# Patient Record
Sex: FEMALE | Race: WHITE | NOT HISPANIC OR LATINO | URBAN - METROPOLITAN AREA
[De-identification: names, ages, dates, MRNs, and addresses within clinical notes are randomized per-mention and may not be internally consistent; named-entity substitution may affect disease eponyms.]

---

## 2019-04-13 ENCOUNTER — INPATIENT (INPATIENT)
Facility: HOSPITAL | Age: 84
LOS: 11 days | Discharge: HOME HEALTH | DRG: 177 | End: 2019-04-25
Attending: STUDENT IN AN ORGANIZED HEALTH CARE EDUCATION/TRAINING PROGRAM | Admitting: STUDENT IN AN ORGANIZED HEALTH CARE EDUCATION/TRAINING PROGRAM
Payer: MEDICARE

## 2019-04-13 VITALS
HEART RATE: 74 BPM | TEMPERATURE: 98 F | HEIGHT: 67 IN | RESPIRATION RATE: 16 BRPM | SYSTOLIC BLOOD PRESSURE: 162 MMHG | OXYGEN SATURATION: 98 % | DIASTOLIC BLOOD PRESSURE: 80 MMHG | WEIGHT: 199.96 LBS

## 2019-04-13 DIAGNOSIS — R06.00 DYSPNEA, UNSPECIFIED: ICD-10-CM

## 2019-04-13 LAB
BASOPHILS # BLD AUTO: 0.1 K/UL — SIGNIFICANT CHANGE UP (ref 0–0.2)
BASOPHILS NFR BLD AUTO: 0.4 % — SIGNIFICANT CHANGE UP (ref 0–2)
EOSINOPHIL # BLD AUTO: 0 K/UL — SIGNIFICANT CHANGE UP (ref 0–0.5)
EOSINOPHIL NFR BLD AUTO: 0.1 % — SIGNIFICANT CHANGE UP (ref 0–6)
GAS PNL BLDV: SIGNIFICANT CHANGE UP
HCT VFR BLD CALC: 28.9 % — LOW (ref 34.5–45)
HGB BLD-MCNC: 9.2 G/DL — LOW (ref 11.5–15.5)
INR BLD: 1.78 RATIO — HIGH (ref 0.88–1.16)
LYMPHOCYTES # BLD AUTO: 0.4 K/UL — LOW (ref 1–3.3)
LYMPHOCYTES # BLD AUTO: 3 % — LOW (ref 13–44)
MCHC RBC-ENTMCNC: 24.8 PG — LOW (ref 27–34)
MCHC RBC-ENTMCNC: 31.9 GM/DL — LOW (ref 32–36)
MCV RBC AUTO: 77.6 FL — LOW (ref 80–100)
MONOCYTES # BLD AUTO: 0.1 K/UL — SIGNIFICANT CHANGE UP (ref 0–0.9)
MONOCYTES NFR BLD AUTO: 0.6 % — LOW (ref 2–14)
NEUTROPHILS # BLD AUTO: 13.4 K/UL — HIGH (ref 1.8–7.4)
NEUTROPHILS NFR BLD AUTO: 96 % — HIGH (ref 43–77)
NT-PROBNP SERPL-SCNC: 7846 PG/ML — HIGH (ref 0–300)
PLATELET # BLD AUTO: 320 K/UL — SIGNIFICANT CHANGE UP (ref 150–400)
PROTHROM AB SERPL-ACNC: 20.6 SEC — HIGH (ref 10–12.9)
RAPID RVP RESULT: SIGNIFICANT CHANGE UP
RBC # BLD: 3.73 M/UL — LOW (ref 3.8–5.2)
RBC # FLD: 17.8 % — HIGH (ref 10.3–14.5)
TROPONIN T, HIGH SENSITIVITY RESULT: 82 NG/L — HIGH (ref 0–51)
TSH SERPL-MCNC: 0.68 UIU/ML — SIGNIFICANT CHANGE UP (ref 0.27–4.2)
WBC # BLD: 14 K/UL — HIGH (ref 3.8–10.5)
WBC # FLD AUTO: 14 K/UL — HIGH (ref 3.8–10.5)

## 2019-04-13 PROCEDURE — 93010 ELECTROCARDIOGRAM REPORT: CPT

## 2019-04-13 PROCEDURE — 99284 EMERGENCY DEPT VISIT MOD MDM: CPT | Mod: 25

## 2019-04-13 PROCEDURE — 71045 X-RAY EXAM CHEST 1 VIEW: CPT | Mod: 26

## 2019-04-13 RX ORDER — FUROSEMIDE 40 MG
40 TABLET ORAL ONCE
Qty: 0 | Refills: 0 | Status: COMPLETED | OUTPATIENT
Start: 2019-04-13 | End: 2019-04-13

## 2019-04-13 RX ADMIN — Medication 40 MILLIGRAM(S): at 21:58

## 2019-04-13 NOTE — ED PROVIDER NOTE - PHYSICAL EXAMINATION
GENERAL: NAD, well-developed  HEAD:  Atraumatic, Normocephalic  EYES: EOMI, PERRLA, conjunctiva and sclera clear  NECK: Supple, No JVD  CHEST/LUNG: decreased bs at b/l bases, L>R, no wheezing or rales  HEART: Regular rate and rhythm; No murmurs, rubs, or gallops  ABDOMEN: Soft, Nontender, Nondistended; Bowel sounds present  EXTREMITIES:  2+ Peripheral Pulses, No clubbing, cyanosis, trace edema at b/l ankles  PSYCH: AAOx3  NEUROLOGY: non-focal  SKIN: No rashes or lesions

## 2019-04-13 NOTE — ED PROVIDER NOTE - OBJECTIVE STATEMENT
84F with ?CHF, dvt on coumadin, PVD s/p stent, breast cancer s/p tx in 2014, RA, CKD (creatinine ~3 per patient) recently dx with b/l plef coming in with increasing SOB. Patient was seen by Pulmonologist Dr Reynoso in office on Thursday and had CXR and US and was told she had water in her lungs b/l with L>R. He planned to remove the fluid 84F with ?CHF, dvt on coumadin, PVD s/p stent, breast cancer s/p tx in 2014, RA, CKD (creatinine ~3 per patient) recently dx with b/l plef coming in with increasing SOB. Patient was seen by Pulmonologist Dr Reynoso in office on Thursday and had CXR and US and was told she had water in her lungs b/l with L>R. He planned to remove the fluid on Tuesday however the patients SOB progressed to the point where she could barely ambulate without dyspnea and therefore came to the hospital. For the past week she has been having a cough productive of yellow sputum. No fevers or chills. Denies CP, palpitations, lightheadedness, dizziness.

## 2019-04-13 NOTE — ED ADULT NURSE NOTE - OBJECTIVE STATEMENT
PT is an ambulatory 84 yr old female a/OX 3 c/o sob at rest and worse on exertion.  PERRL wnl, partida with equal strength.  Lungs have crackles at the bases b/l.  Denies chest pain.  Sob is worse on exertion and patient has been recently treated with Lasix for fluid overload in lungs.  Pt has a wet, non productive cough.  Abdomen NT ND.  No fevers, chills or N/V.  Abdomen NT ND.  No urinary symptoms.  Peripheral pulses +1, no edema

## 2019-04-13 NOTE — ED ADULT NURSE NOTE - TEMPLATE
Respiratory Quality 431: Preventive Care And Screening: Unhealthy Alcohol Use - Screening: Patient screened for unhealthy alcohol use using a single question and scores less than 2 times per year Detail Level: Detailed

## 2019-04-13 NOTE — ED PROVIDER NOTE - PROGRESS NOTE DETAILS
[place] PGY2: CXR L sided pleural effusion on assessment pt with very mild increased WOB but speaking comfortably in full sentences on RA will give lasix 40 mg IV x 1 and admit

## 2019-04-13 NOTE — ED ADULT NURSE NOTE - NSIMPLEMENTINTERV_GEN_ALL_ED
Implemented All Fall with Harm Risk Interventions:  [place] to call system. Call bell, personal items and telephone within reach. Instruct patient to call for assistance. Room bathroom lighting operational. Non-slip footwear when patient is off stretcher. Physically safe environment: no spills, clutter or unnecessary equipment. Stretcher in lowest position, wheels locked, appropriate side rails in place. Provide visual cue, wrist band, yellow gown, etc. Monitor gait and stability. Monitor for mental status changes and reorient to person, place, and time. Review medications for side effects contributing to fall risk. Reinforce activity limits and safety measures with patient and family. Provide visual clues: red socks.

## 2019-04-13 NOTE — ED PROVIDER NOTE - CLINICAL SUMMARY MEDICAL DECISION MAKING FREE TEXT BOX
84F with ?CHF, dvt on coumadin, PVD s/p stent, breast cancer s/p tx in 2014, RA, CKD (creatinine ~3 per patient) recently dx with b/l plef coming in with increasing SOB. 84F with ?CHF, dvt on coumadin, PVD s/p stent, breast cancer s/p tx in 2014, RA, CKD (creatinine ~3 per patient) recently dx with b/l plef coming in with increasing SOB possibly due to progression of plef from CHF vs breast cancer vs infection given productive cough - cbc, cmp, pt/inr, rvp, trop, tsh, bnp, cxr, ekg 84F with ?CHF, dvt on coumadin, PVD s/p stent, breast cancer s/p tx in 2014, RA, CKD (creatinine ~3 per patient) recently dx with b/l plef coming in with increasing SOB possibly due to progression of plef from CHF vs breast cancer vs infection given productive cough - cbc, cmp, pt/inr, rvp, trop, tsh, bnp, cxr, ekg and admission to the hospital ZR

## 2019-04-13 NOTE — ED PROVIDER NOTE - PMH
Anarthritic Rheumatoid Disease    Breast cancer  bilateral  CKD (chronic kidney disease)    Mee HTN    GERD (Gastroesophageal Reflux    Hypothyroid    Prediabetes

## 2019-04-14 DIAGNOSIS — K21.9 GASTRO-ESOPHAGEAL REFLUX DISEASE WITHOUT ESOPHAGITIS: ICD-10-CM

## 2019-04-14 DIAGNOSIS — D50.9 IRON DEFICIENCY ANEMIA, UNSPECIFIED: ICD-10-CM

## 2019-04-14 DIAGNOSIS — Z29.9 ENCOUNTER FOR PROPHYLACTIC MEASURES, UNSPECIFIED: ICD-10-CM

## 2019-04-14 DIAGNOSIS — J90 PLEURAL EFFUSION, NOT ELSEWHERE CLASSIFIED: ICD-10-CM

## 2019-04-14 DIAGNOSIS — E03.9 HYPOTHYROIDISM, UNSPECIFIED: ICD-10-CM

## 2019-04-14 DIAGNOSIS — M06.9 RHEUMATOID ARTHRITIS, UNSPECIFIED: ICD-10-CM

## 2019-04-14 DIAGNOSIS — N18.5 CHRONIC KIDNEY DISEASE, STAGE 5: ICD-10-CM

## 2019-04-14 LAB
24R-OH-CALCIDIOL SERPL-MCNC: 37.6 NG/ML — SIGNIFICANT CHANGE UP (ref 30–80)
ANION GAP SERPL CALC-SCNC: 15 MMOL/L — SIGNIFICANT CHANGE UP (ref 5–17)
APPEARANCE UR: ABNORMAL
APTT BLD: 30.8 SEC — SIGNIFICANT CHANGE UP (ref 27.5–36.3)
BILIRUB UR-MCNC: NEGATIVE — SIGNIFICANT CHANGE UP
BUN SERPL-MCNC: 49 MG/DL — HIGH (ref 7–23)
CALCIUM SERPL-MCNC: 9.3 MG/DL — SIGNIFICANT CHANGE UP (ref 8.4–10.5)
CALCIUM SERPL-MCNC: 9.7 MG/DL — SIGNIFICANT CHANGE UP (ref 8.4–10.5)
CHLORIDE SERPL-SCNC: 109 MMOL/L — HIGH (ref 96–108)
CHOLEST SERPL-MCNC: 157 MG/DL — SIGNIFICANT CHANGE UP (ref 10–199)
CO2 SERPL-SCNC: 19 MMOL/L — LOW (ref 22–31)
COLOR SPEC: SIGNIFICANT CHANGE UP
CREAT ?TM UR-MCNC: 27 MG/DL — SIGNIFICANT CHANGE UP
CREAT SERPL-MCNC: 3.89 MG/DL — HIGH (ref 0.5–1.3)
DIFF PNL FLD: SIGNIFICANT CHANGE UP
FERRITIN SERPL-MCNC: 34 NG/ML — SIGNIFICANT CHANGE UP (ref 15–150)
GLUCOSE SERPL-MCNC: 170 MG/DL — HIGH (ref 70–99)
GLUCOSE UR QL: SIGNIFICANT CHANGE UP
HCT VFR BLD CALC: 28 % — LOW (ref 34.5–45)
HDLC SERPL-MCNC: 55 MG/DL — SIGNIFICANT CHANGE UP
HGB BLD-MCNC: 8.6 G/DL — LOW (ref 11.5–15.5)
IRON SATN MFR SERPL: 14 UG/DL — LOW (ref 30–160)
IRON SATN MFR SERPL: 5 % — LOW (ref 14–50)
KETONES UR-MCNC: NEGATIVE — SIGNIFICANT CHANGE UP
LEUKOCYTE ESTERASE UR-ACNC: NEGATIVE — SIGNIFICANT CHANGE UP
LIPID PNL WITH DIRECT LDL SERPL: 89 MG/DL — SIGNIFICANT CHANGE UP
MCHC RBC-ENTMCNC: 24 PG — LOW (ref 27–34)
MCHC RBC-ENTMCNC: 30.7 GM/DL — LOW (ref 32–36)
MCV RBC AUTO: 78.2 FL — LOW (ref 80–100)
NITRITE UR-MCNC: NEGATIVE — SIGNIFICANT CHANGE UP
PH UR: 6 — SIGNIFICANT CHANGE UP (ref 5–8)
PHOSPHATE SERPL-MCNC: 4.1 MG/DL — SIGNIFICANT CHANGE UP (ref 2.5–4.5)
PLATELET # BLD AUTO: 338 K/UL — SIGNIFICANT CHANGE UP (ref 150–400)
POTASSIUM SERPL-MCNC: 4.6 MMOL/L — SIGNIFICANT CHANGE UP (ref 3.5–5.3)
POTASSIUM SERPL-SCNC: 4.6 MMOL/L — SIGNIFICANT CHANGE UP (ref 3.5–5.3)
PROT ?TM UR-MCNC: 163 MG/DL — HIGH (ref 0–12)
PROT UR-MCNC: ABNORMAL
PROT/CREAT UR-RTO: 6 RATIO — HIGH (ref 0–0.2)
PTH-INTACT FLD-MCNC: 226 PG/ML — HIGH (ref 15–65)
RBC # BLD: 3.58 M/UL — LOW (ref 3.8–5.2)
RBC # BLD: 3.58 M/UL — LOW (ref 3.8–5.2)
RBC # FLD: 18.7 % — HIGH (ref 10.3–14.5)
RETICS #: 45.1 K/UL — SIGNIFICANT CHANGE UP (ref 25–125)
RETICS/RBC NFR: 1.3 % — SIGNIFICANT CHANGE UP (ref 0.5–2.5)
SODIUM SERPL-SCNC: 143 MMOL/L — SIGNIFICANT CHANGE UP (ref 135–145)
SP GR SPEC: 1.01 — LOW (ref 1.01–1.02)
TIBC SERPL-MCNC: 289 UG/DL — SIGNIFICANT CHANGE UP (ref 220–430)
TOTAL CHOLESTEROL/HDL RATIO MEASUREMENT: 2.9 RATIO — LOW (ref 3.3–7.1)
TRIGL SERPL-MCNC: 66 MG/DL — SIGNIFICANT CHANGE UP (ref 10–149)
TROPONIN T, HIGH SENSITIVITY RESULT: 71 NG/L — HIGH (ref 0–51)
UIBC SERPL-MCNC: 275 UG/DL — SIGNIFICANT CHANGE UP (ref 110–370)
UROBILINOGEN FLD QL: SIGNIFICANT CHANGE UP
WBC # BLD: 14 K/UL — HIGH (ref 3.8–10.5)
WBC # FLD AUTO: 14 K/UL — HIGH (ref 3.8–10.5)

## 2019-04-14 PROCEDURE — 99233 SBSQ HOSP IP/OBS HIGH 50: CPT | Mod: GC

## 2019-04-14 PROCEDURE — 71250 CT THORAX DX C-: CPT | Mod: 26

## 2019-04-14 PROCEDURE — 99223 1ST HOSP IP/OBS HIGH 75: CPT

## 2019-04-14 RX ORDER — HEPARIN SODIUM 5000 [USP'U]/ML
7500 INJECTION INTRAVENOUS; SUBCUTANEOUS EVERY 6 HOURS
Qty: 0 | Refills: 0 | Status: DISCONTINUED | OUTPATIENT
Start: 2019-04-14 | End: 2019-04-14

## 2019-04-14 RX ORDER — OXYCODONE HYDROCHLORIDE 5 MG/1
2.5 TABLET ORAL
Qty: 0 | Refills: 0 | Status: DISCONTINUED | OUTPATIENT
Start: 2019-04-14 | End: 2019-04-21

## 2019-04-14 RX ORDER — ALPRAZOLAM 0.25 MG
1 TABLET ORAL
Qty: 0 | Refills: 0 | COMMUNITY

## 2019-04-14 RX ORDER — ERGOCALCIFEROL 1.25 MG/1
50000 CAPSULE ORAL
Qty: 0 | Refills: 0 | Status: DISCONTINUED | OUTPATIENT
Start: 2019-04-14 | End: 2019-04-25

## 2019-04-14 RX ORDER — CHOLECALCIFEROL (VITAMIN D3) 125 MCG
1 CAPSULE ORAL
Qty: 0 | Refills: 0 | COMMUNITY

## 2019-04-14 RX ORDER — HYDROXYCHLOROQUINE SULFATE 200 MG
200 TABLET ORAL DAILY
Qty: 0 | Refills: 0 | Status: DISCONTINUED | OUTPATIENT
Start: 2019-04-14 | End: 2019-04-25

## 2019-04-14 RX ORDER — AMLODIPINE BESYLATE 2.5 MG/1
5 TABLET ORAL DAILY
Qty: 0 | Refills: 0 | Status: DISCONTINUED | OUTPATIENT
Start: 2019-04-14 | End: 2019-04-16

## 2019-04-14 RX ORDER — PANTOPRAZOLE SODIUM 20 MG/1
40 TABLET, DELAYED RELEASE ORAL
Qty: 0 | Refills: 0 | Status: DISCONTINUED | OUTPATIENT
Start: 2019-04-14 | End: 2019-04-25

## 2019-04-14 RX ORDER — LEVOTHYROXINE SODIUM 125 MCG
1 TABLET ORAL
Qty: 0 | Refills: 0 | COMMUNITY

## 2019-04-14 RX ORDER — IPRATROPIUM/ALBUTEROL SULFATE 18-103MCG
3 AEROSOL WITH ADAPTER (GRAM) INHALATION EVERY 4 HOURS
Qty: 0 | Refills: 0 | Status: DISCONTINUED | OUTPATIENT
Start: 2019-04-14 | End: 2019-04-25

## 2019-04-14 RX ORDER — AZITHROMYCIN 500 MG/1
TABLET, FILM COATED ORAL
Qty: 0 | Refills: 0 | Status: DISCONTINUED | OUTPATIENT
Start: 2019-04-14 | End: 2019-04-14

## 2019-04-14 RX ORDER — ENOXAPARIN SODIUM 100 MG/ML
90 INJECTION SUBCUTANEOUS
Qty: 0 | Refills: 0 | Status: DISCONTINUED | OUTPATIENT
Start: 2019-04-14 | End: 2019-04-14

## 2019-04-14 RX ORDER — ALBUTEROL 90 UG/1
2 AEROSOL, METERED ORAL
Qty: 0 | Refills: 0 | COMMUNITY

## 2019-04-14 RX ORDER — PANTOPRAZOLE SODIUM 20 MG/1
1 TABLET, DELAYED RELEASE ORAL
Qty: 0 | Refills: 0 | COMMUNITY

## 2019-04-14 RX ORDER — FUROSEMIDE 40 MG
40 TABLET ORAL ONCE
Qty: 0 | Refills: 0 | Status: DISCONTINUED | OUTPATIENT
Start: 2019-04-14 | End: 2019-04-14

## 2019-04-14 RX ORDER — IRON SUCROSE 20 MG/ML
100 INJECTION, SOLUTION INTRAVENOUS ONCE
Qty: 0 | Refills: 0 | Status: COMPLETED | OUTPATIENT
Start: 2019-04-14 | End: 2019-04-14

## 2019-04-14 RX ORDER — GLUCOSAMINE/CHONDROITIN/C/MANG 500-400 MG
3 CAPSULE ORAL
Qty: 0 | Refills: 0 | COMMUNITY

## 2019-04-14 RX ORDER — HYDROXYCHLOROQUINE SULFATE 200 MG
1 TABLET ORAL
Qty: 0 | Refills: 0 | COMMUNITY

## 2019-04-14 RX ORDER — HEPARIN SODIUM 5000 [USP'U]/ML
INJECTION INTRAVENOUS; SUBCUTANEOUS
Qty: 25000 | Refills: 0 | Status: DISCONTINUED | OUTPATIENT
Start: 2019-04-14 | End: 2019-04-14

## 2019-04-14 RX ORDER — METOPROLOL TARTRATE 50 MG
1 TABLET ORAL
Qty: 0 | Refills: 0 | COMMUNITY

## 2019-04-14 RX ORDER — IPRATROPIUM/ALBUTEROL SULFATE 18-103MCG
3 AEROSOL WITH ADAPTER (GRAM) INHALATION EVERY 6 HOURS
Qty: 0 | Refills: 0 | Status: DISCONTINUED | OUTPATIENT
Start: 2019-04-14 | End: 2019-04-14

## 2019-04-14 RX ORDER — ZOLPIDEM TARTRATE 10 MG/1
5 TABLET ORAL AT BEDTIME
Qty: 0 | Refills: 0 | Status: DISCONTINUED | OUTPATIENT
Start: 2019-04-14 | End: 2019-04-17

## 2019-04-14 RX ORDER — MULTIVIT-MIN/FERROUS GLUCONATE 9 MG/15 ML
1 LIQUID (ML) ORAL
Qty: 0 | Refills: 0 | COMMUNITY

## 2019-04-14 RX ORDER — HEPARIN SODIUM 5000 [USP'U]/ML
3500 INJECTION INTRAVENOUS; SUBCUTANEOUS EVERY 6 HOURS
Qty: 0 | Refills: 0 | Status: DISCONTINUED | OUTPATIENT
Start: 2019-04-14 | End: 2019-04-14

## 2019-04-14 RX ORDER — FLUTICASONE PROPIONATE AND SALMETEROL 50; 250 UG/1; UG/1
1 POWDER ORAL; RESPIRATORY (INHALATION)
Qty: 0 | Refills: 0 | COMMUNITY

## 2019-04-14 RX ORDER — LEVOTHYROXINE SODIUM 125 MCG
50 TABLET ORAL DAILY
Qty: 0 | Refills: 0 | Status: DISCONTINUED | OUTPATIENT
Start: 2019-04-14 | End: 2019-04-25

## 2019-04-14 RX ORDER — AZITHROMYCIN 500 MG/1
500 TABLET, FILM COATED ORAL ONCE
Qty: 0 | Refills: 0 | Status: DISCONTINUED | OUTPATIENT
Start: 2019-04-14 | End: 2019-04-14

## 2019-04-14 RX ADMIN — Medication 3 MILLILITER(S): at 12:21

## 2019-04-14 RX ADMIN — Medication 3 MILLILITER(S): at 23:34

## 2019-04-14 RX ADMIN — Medication 1 TABLET(S): at 12:54

## 2019-04-14 RX ADMIN — IRON SUCROSE 210 MILLIGRAM(S): 20 INJECTION, SOLUTION INTRAVENOUS at 22:15

## 2019-04-14 RX ADMIN — ERGOCALCIFEROL 50000 UNIT(S): 1.25 CAPSULE ORAL at 23:34

## 2019-04-14 RX ADMIN — PANTOPRAZOLE SODIUM 40 MILLIGRAM(S): 20 TABLET, DELAYED RELEASE ORAL at 05:52

## 2019-04-14 RX ADMIN — OXYCODONE HYDROCHLORIDE 2.5 MILLIGRAM(S): 5 TABLET ORAL at 12:58

## 2019-04-14 RX ADMIN — AMLODIPINE BESYLATE 5 MILLIGRAM(S): 2.5 TABLET ORAL at 05:52

## 2019-04-14 RX ADMIN — Medication 200 MILLIGRAM(S): at 12:54

## 2019-04-14 RX ADMIN — Medication 3 MILLILITER(S): at 16:22

## 2019-04-14 RX ADMIN — Medication 3 MILLILITER(S): at 20:49

## 2019-04-14 RX ADMIN — Medication 50 MICROGRAM(S): at 05:52

## 2019-04-14 NOTE — H&P ADULT - ASSESSMENT
84yoF with PMH of PVD s/p stent, breast cancer s/p lumpectomy and RTx, RA, CKD, PVD s/p RLE stent, DVT on coumadin, hypothyroidism who presents to the ED with complaint of shortness of breath, found on xray to have L sided pleural effusion. 84yoF with PMH of PVD s/p stent, breast cancer s/p lumpectomy and RTx, RA, CKD, PVD s/p RLE stent, DVT on coumadin, hypothyroidism who presents to the ED with complaint of shortness of breath, found on xray to have L sided pleural effusion.      Triazolam 0.25 quantity 30, 30 day supply (3/7/2019)

## 2019-04-14 NOTE — CONSULT NOTE ADULT - ASSESSMENT
84yoF with PMH of PVD s/p stent, breast cancer s/p lumpectomy and RTx, RA, CKD, PVD s/p RLE stent, DVT on coumadin, hypothyroidism who presents to the ED with complaint of shortness of breath. has pl. effusions and breast nodule.     1- maria g on ckd  2- anemia  3- pl. effusion +/- CHF  4- htn   5- hyperparathyroidism     cr worsening with nephrotic syndrome   etiology of nephrotic syndrome ? due to fsgs vs membranous nephropathy   renal sono   start iv venofer 100 mg iv daily for 3 days  also to have retic ct   cont norvasc 5 mg daily   start ergocalciferol 87365 U weekly   onc work up.   lasix 40 mg iv bid if creatinine worsens from here then hold lasix  check pro bnp

## 2019-04-14 NOTE — H&P ADULT - PROBLEM SELECTOR PLAN 7
VTE ppx: venodyne boots, start heparin subQ after thoracentesis  Activity: bedrest for now  Diet: NPO except meds

## 2019-04-14 NOTE — H&P ADULT - NSHPSOCIALHISTORY_GEN_ALL_CORE
The patient lives with a home health aide.  She ambulates with a walker or cane at home. She denies tobacco or alcohol use.

## 2019-04-14 NOTE — H&P ADULT - NSHPLABSRESULTS_GEN_ALL_CORE
Labs, imaging and EKG personally reviewed by me.     CBC found WBC elevated 14, anemia with Hgb of 9.2, and microcytoiss MCV 77.6.   Lactate is 1.6.  RVP negative    LABS:                        9.2    14.0  )-----------( 320      ( 13 Apr 2019 19:38 )             28.9     Hgb Trend: 9.2<--  04-13    142  |  108  |  45<H>  ----------------------------<  197<H>  4.9   |  18<L>  |  3.55<H>    Ca    9.5      13 Apr 2019 19:38    TPro  7.3  /  Alb  3.7  /  TBili  0.2  /  DBili  x   /  AST  17  /  ALT  15  /  AlkPhos  90  04-13    Creatinine Trend: 3.55<--  PT/INR - ( 13 Apr 2019 19:38 )   PT: 20.6 sec;   INR: 1.78 ratio       Venous Blood Gas:  04-13 @ 19:41  7.29/44/30/21/46  VBG Lactate: 1.6    < from: Xray Chest 1 View AP/PA (04.13.19 @ 20:03) >     ******PRELIMINARY REPORT******        INTERPRETATION:  Mild pulmonary edema. Left basilar opacity, which may represent left pleural effusion or atelectasis.  AK  < end of copied text > Labs, imaging and EKG personally reviewed by me.     CBC found WBC elevated 14, anemia with Hgb of 9.2, and microcytoiss MCV 77.6.   Lactate is 1.6.  RVP negative    EKG sinus HR 74, normal axis  LABS:                        9.2    14.0  )-----------( 320      ( 13 Apr 2019 19:38 )             28.9     Hgb Trend: 9.2<--  04-13    142  |  108  |  45<H>  ----------------------------<  197<H>  4.9   |  18<L>  |  3.55<H>    Ca    9.5      13 Apr 2019 19:38    TPro  7.3  /  Alb  3.7  /  TBili  0.2  /  DBili  x   /  AST  17  /  ALT  15  /  AlkPhos  90  04-13    Creatinine Trend: 3.55<--  PT/INR - ( 13 Apr 2019 19:38 )   PT: 20.6 sec;   INR: 1.78 ratio       Venous Blood Gas:  04-13 @ 19:41  7.29/44/30/21/46  VBG Lactate: 1.6    < from: Xray Chest 1 View AP/PA (04.13.19 @ 20:03) >     ******PRELIMINARY REPORT******        INTERPRETATION:  Mild pulmonary edema. Left basilar opacity, which may represent left pleural effusion or atelectasis.  AK  < end of copied text >

## 2019-04-14 NOTE — CONSULT NOTE ADULT - ATTENDING COMMENTS
Dyspnea on exertion:   - CXR with left pleural effusion, CT Chest with bilateral pleural effusions  - patient with audible wheezing, cough, dyspnea   - albuterol treatments  - recommend pulmonary consult for evaluation of pleural fluid with US, see if there is a pocket for drainage.  If drained, please send for cytopathology.    # CKD:   - patient states creatinine last week was around 3  - having significant dyspnea, seems like creatinine is worsening  - recommend      # Breast mass: 2.4 x 2cm spiculated nodule in left breast  - concerning for breast cancer, also with lung nodule (0.9cm) and pleural effusions which could also be malignant  - spoke with Radiology - do NOT do comprehensive breast imaging (diagnostic mammogram/ultrasound) inpatient or breast biopsies.   - this will have to be done as an outpatient, however if thoracentesis is possible and recommended for symptomatic relief, may provide diagnosis  - can check CT A/P for any other evidence of metastatic disease

## 2019-04-14 NOTE — CONSULT NOTE ADULT - SUBJECTIVE AND OBJECTIVE BOX
HPI:  84F with PMH of PVD s/p stent, breast cancer s/p lumpectomy and RTx, RA, CKD, PVD s/p RLE stent, DVT on coumadin, hypothyroidism who presents to the ED with complaint of shortness of breath. The patient states she was diagnosed with "water in the lungs" in October 2018, and had been monitored over time with no intervention. Over the last week, she developed worsening ORTIZ, cough productive of dark yellow sputum, and wheezing, prompting her to visit Dr. Reynoso. She was planned for thoracentesis for this week, and her home dose of coumadin was held. Because patient was no longer able to ambulate short distances from her kitchen to her bathroom due to dyspnea, patient was brought to ED by her family and friends. She denies any fever, chills, N/V, CP palpitations, orthopnea or PND.   Dr. Reynoso had started her on lasix 40mg PO qd, with which she has had increased urine output.   Pt has known CKD. She states her normal SCr is around 2, however on recent labs had increased to about 3.17.    Oncologic history:   Patient was diagnosed with bilateral T1cN0 ER+ breast cancer in 2009, s/p lumpectomy (Dr. Mercy Argueta) and radiation, on letrozole from ([id number]).  She last saw Dr. Yusuf Covington in 2016.  Patient states that she has not followed up with  [facility] in many years as she was busy with her ailing .  Gets annual mammograms done, last Nov 2018 which was normal.         PAST MEDICAL & SURGICAL HISTORY:  CKD (chronic kidney disease)  Prediabetes  Breast cancer: hx lumpectomy, radiation  Hypothyroid  Anarthritic Rheumatoid Disease  Mee HTN  GERD (Gastroesophageal Reflux  S/P shoulder surgery: right  Embolus: Right leg arterial stent  H/O: Hysterectomy      Allergies  Allegra (Unknown)  amoxicillin (Hives)  Cipro (Hives)  shellfish (Rash)        MEDICATIONS  (STANDING):  ALBUTerol/ipratropium for Nebulization 3 milliLiter(s) Nebulizer every 6 hours  amLODIPine   Tablet 5 milliGRAM(s) Oral daily  calcium carbonate 1250 mG  + Vitamin D (OsCal 500 + D) 1 Tablet(s) Oral daily  hydroxychloroquine 200 milliGRAM(s) Oral daily  levothyroxine 50 MICROGram(s) Oral daily  pantoprazole    Tablet 40 milliGRAM(s) Oral before breakfast    MEDICATIONS  (PRN):  oxyCODONE    IR 2.5 milliGRAM(s) Oral four times a day PRN Severe Pain (7 - 10)  zolpidem 5 milliGRAM(s) Oral at bedtime PRN Insomnia      FAMILY HISTORY:      SOCIAL HISTORY: No EtOH, remote smoking history       REVIEW OF SYSTEMS:  CONSTITUTIONAL: No weakness, fevers or chills  EYES/ENT: No visual changes;  No vertigo or throat pain   NECK: No pain or stiffness  RESPIRATORY: +DYSPNEA ON EXERTION, +COUGH, +WHEEZING   CARDIOVASCULAR: No chest pain or palpitations  GASTROINTESTINAL: No abdominal or epigastric pain. No nausea, vomiting, or hematemesis; No diarrhea or constipation. No melena or hematochezia.  GENITOURINARY: No dysuria, frequency or hematuria  NEUROLOGICAL: No numbness or weakness  SKIN: No itching, burning, rashes, or lesions   All other review of systems is negative unless indicated above.      Height (cm): 170.18 (04-13 @ 17:25)  Weight (kg): 90.7 (04-13 @ 17:25)  BMI (kg/m2): 31.3 (04-13 @ 17:25)  BSA (m2): 2.02 (04-13 @ 17:25)      T(F): 98 (04-14-19 @ 08:00), Max: 98.9 (04-13-19 @ 18:39)  HR: 80 (04-14-19 @ 08:00)  BP: 148/71 (04-14-19 @ 08:00)  RR: 17 (04-14-19 @ 08:00)  SpO2: 94% (04-14-19 @ 08:00)      GENERAL: NAD, well-developed  HEAD:  Atraumatic, Normocephalic  EYES: EOMI, conjunctiva and sclera clear  NECK: Supple  CHEST/LUNG: Clear to auscultation bilaterally; No wheezes or crackles   HEART: Regular rate and rhythm; No murmurs, rubs, or gallops  ABDOMEN: Soft, Nontender, Nondistended; Bowel sounds present  EXTREMITIES:  No clubbing, cyanosis, or edema  NEUROLOGY: non-focal  SKIN: No rashes or lesions                          8.6    14.00 )-----------( 338      ( 14 Apr 2019 10:23 )             28.0       04-14    143  |  109<H>  |  49<H>  ----------------------------<  170<H>  4.6   |  19<L>  |  3.89<H>    Ca    9.7      14 Apr 2019 05:58  Phos  4.1     04-14    TPro  7.3  /  Alb  3.7  /  TBili  0.2  /  DBili  x   /  AST  17  /  ALT  15  /  AlkPhos  90  04-13      Phosphorus Level, Serum: 4.1 mg/dL (04-14 @ 05:58)      PT/INR - ( 13 Apr 2019 19:38 )   PT: 20.6 sec;   INR: 1.78 ratio         RADIOLOGY:     < from: CT Chest No Cont (04.14.19 @ 04:55) >    EXAM:  CT CHEST                            PROCEDURE DATE:  04/14/2019            INTERPRETATION:  CT CHEST WITHOUT CONTRAST    INDICATION: Shortness of breath, pleural effusion    TECHNIQUE: Unenhanced helical images were obtained of the chest. Coronal and sagittal images were reconstructed. Maximum intensity projection   images were generated.     COMPARISON: No prior chest CT.    FINDINGS:     TUBES/LINES: None.    AIRWAYS, LUNGS, PLEURA: Patent central airways. No bronchial wall thickening or bronchiectasis. Foci of bronchiolar impaction within the basilar left lower lobe and lingula.    Small bilateral pleural effusions and subsegmental atelectasis of basilar lower lobes. Partial atelectasis of the lingula.    1.5 and 0.9 cm groundglass nodules within the right upper lobe (series 2 images 27 and 35), possibly inflammatory.    0.9 cm subpleural solid nodule within the basilar right lower lobe (series 2 image 68).    MEDIASTINUM, LYMPH NODES: No lymphadenopathy.    HEART AND VASCULATURE: The heart is mildly enlarged. The left atrium is dilated. Small pericardial effusion. Severe mitral annulus calcification. Moderate calcific coronary atherosclerosis. Thoracic aorta normal in   caliber.    UPPER ABDOMEN: Small hiatal hernia. Multiple bilateral renal cysts. Aortic atherosclerosis. Fatty atrophy of the pancreas.    BONES AND SOFT TISSUES: 2.4 x 2 cm spiculated nodule within the posterior depth of the left breast (series 2 image 54) suspicious for neoplasm.     Osteopenia. Old fracture deformity of the multiple left ribs. Scoliosis. Severe degenerative disease of the lower thoracic/upper lumbar spine.    Fixation bar and screws within the right humerus.    LOWER NECK: Multiple thyroid nodules measuring up to 1.3 cm. Recommend further evaluation with ultrasound.    IMPRESSION:     1.  Foci of bronchiolar impaction within the basilar left lower lobe and lingula. Small bilateral pleural effusions and subsegmental atelectasis of basilar lower lobes. Partial atelectasis of the lingula. Underlying   infection cannot be ruled out.     2.  0.9 cm subpleural solid nodule within the basilar right lower lobe (series 2 image 68). Recommend follow-up chest CT in 3 months to determine the stability/resolution.     3.  2.4 x 2 cm spiculated nodule within the posterior depth of the left breast (series 2 image 54). Recommend further evaluation with diagnostic mammogram and breast ultrasound.    These findings were discussed with NP Reyes, on 4/14/2019 8:53 AM      NORM CAMPBELL M.D., ATTENDING RADIOLOGIST  This document has been electronically signed. Apr 14 2019  8:54AM HPI:  84F with PMH of PVD s/p stent, breast cancer s/p lumpectomy and RTx, RA, CKD, PVD s/p RLE stent, DVT on coumadin, hypothyroidism who presents to the ED with complaint of shortness of breath. The patient states she was diagnosed with "water in the lungs" in October 2018, and had been monitored over time with no intervention. Over the last week, she developed worsening ORTIZ, cough productive of dark yellow sputum, and wheezing, prompting her to visit Dr. Reynoso. She was planned for thoracentesis for this week, and her home dose of coumadin was held. Because patient was no longer able to ambulate short distances from her kitchen to her bathroom due to dyspnea, patient was brought to ED by her family and friends. She denies any fever, chills, N/V, CP palpitations, orthopnea or PND.   Dr. Reynoso had started her on lasix 40mg PO qd, with which she has had increased urine output.   Pt has known CKD. She states her normal SCr is around 2, however on recent labs had increased to about 3.17.    Oncologic history:   Patient was diagnosed with bilateral T1cN0 ER+ breast cancer in 2009, s/p lumpectomy (Dr. Mercy Argueta) and radiation, on letrozole from ([id number]).  She last saw Dr. Yusuf Covington in 2016.  Patient states that she has not followed up with  [facility] in many years as she was busy with her ailing .  Gets annual mammograms done, last Nov 2018 which was normal.         PAST MEDICAL & SURGICAL HISTORY:  CKD (chronic kidney disease)  Prediabetes  Breast cancer: hx lumpectomy, radiation  Hypothyroid  Anarthritic Rheumatoid Disease  Mee HTN  GERD (Gastroesophageal Reflux  S/P shoulder surgery: right  Embolus: Right leg arterial stent  H/O: Hysterectomy      Allergies  Allegra (Unknown)  amoxicillin (Hives)  Cipro (Hives)  shellfish (Rash)        MEDICATIONS  (STANDING):  ALBUTerol/ipratropium for Nebulization 3 milliLiter(s) Nebulizer every 6 hours  amLODIPine   Tablet 5 milliGRAM(s) Oral daily  calcium carbonate 1250 mG  + Vitamin D (OsCal 500 + D) 1 Tablet(s) Oral daily  hydroxychloroquine 200 milliGRAM(s) Oral daily  levothyroxine 50 MICROGram(s) Oral daily  pantoprazole    Tablet 40 milliGRAM(s) Oral before breakfast    MEDICATIONS  (PRN):  oxyCODONE    IR 2.5 milliGRAM(s) Oral four times a day PRN Severe Pain (7 - 10)  zolpidem 5 milliGRAM(s) Oral at bedtime PRN Insomnia      FAMILY HISTORY:      SOCIAL HISTORY: No EtOH, remote smoking history       REVIEW OF SYSTEMS:  CONSTITUTIONAL: No weakness, fevers or chills  EYES/ENT: No visual changes;  No vertigo or throat pain   NECK: No pain or stiffness  RESPIRATORY: +DYSPNEA ON EXERTION, +COUGH, +WHEEZING   CARDIOVASCULAR: No chest pain or palpitations  GASTROINTESTINAL: No abdominal or epigastric pain. No nausea, vomiting, or hematemesis; No diarrhea or constipation. No melena or hematochezia.  GENITOURINARY: No dysuria, frequency or hematuria  NEUROLOGICAL: No numbness or weakness  SKIN: No itching, burning, rashes, or lesions   All other review of systems is negative unless indicated above.      Height (cm): 170.18 (04-13 @ 17:25)  Weight (kg): 90.7 (04-13 @ 17:25)  BMI (kg/m2): 31.3 (04-13 @ 17:25)  BSA (m2): 2.02 (04-13 @ 17:25)      T(F): 98 (04-14-19 @ 08:00), Max: 98.9 (04-13-19 @ 18:39)  HR: 80 (04-14-19 @ 08:00)  BP: 148/71 (04-14-19 @ 08:00)  RR: 17 (04-14-19 @ 08:00)  SpO2: 94% (04-14-19 @ 08:00)      GENERAL: NAD, well-developed  HEAD:  Atraumatic, Normocephalic  EYES: EOMI, conjunctiva and sclera clear  NECK: Supple  CHEST/LUNG: +expiratory wheezing bilaterally   HEART: Regular rate and rhythm; No murmurs, rubs, or gallops  ABDOMEN: Soft, Nontender, Nondistended; Bowel sounds present  EXTREMITIES:  No clubbing, cyanosis, or edema  NEUROLOGY: non-focal  SKIN: No rashes or lesions                          8.6    14.00 )-----------( 338      ( 14 Apr 2019 10:23 )             28.0       04-14    143  |  109<H>  |  49<H>  ----------------------------<  170<H>  4.6   |  19<L>  |  3.89<H>    Ca    9.7      14 Apr 2019 05:58  Phos  4.1     04-14    TPro  7.3  /  Alb  3.7  /  TBili  0.2  /  DBili  x   /  AST  17  /  ALT  15  /  AlkPhos  90  04-13      Phosphorus Level, Serum: 4.1 mg/dL (04-14 @ 05:58)      PT/INR - ( 13 Apr 2019 19:38 )   PT: 20.6 sec;   INR: 1.78 ratio         RADIOLOGY:     < from: CT Chest No Cont (04.14.19 @ 04:55) >    EXAM:  CT CHEST                            PROCEDURE DATE:  04/14/2019            INTERPRETATION:  CT CHEST WITHOUT CONTRAST    INDICATION: Shortness of breath, pleural effusion    TECHNIQUE: Unenhanced helical images were obtained of the chest. Coronal and sagittal images were reconstructed. Maximum intensity projection   images were generated.     COMPARISON: No prior chest CT.    FINDINGS:     TUBES/LINES: None.    AIRWAYS, LUNGS, PLEURA: Patent central airways. No bronchial wall thickening or bronchiectasis. Foci of bronchiolar impaction within the basilar left lower lobe and lingula.    Small bilateral pleural effusions and subsegmental atelectasis of basilar lower lobes. Partial atelectasis of the lingula.    1.5 and 0.9 cm groundglass nodules within the right upper lobe (series 2 images 27 and 35), possibly inflammatory.    0.9 cm subpleural solid nodule within the basilar right lower lobe (series 2 image 68).    MEDIASTINUM, LYMPH NODES: No lymphadenopathy.    HEART AND VASCULATURE: The heart is mildly enlarged. The left atrium is dilated. Small pericardial effusion. Severe mitral annulus calcification. Moderate calcific coronary atherosclerosis. Thoracic aorta normal in   caliber.    UPPER ABDOMEN: Small hiatal hernia. Multiple bilateral renal cysts. Aortic atherosclerosis. Fatty atrophy of the pancreas.    BONES AND SOFT TISSUES: 2.4 x 2 cm spiculated nodule within the posterior depth of the left breast (series 2 image 54) suspicious for neoplasm.     Osteopenia. Old fracture deformity of the multiple left ribs. Scoliosis. Severe degenerative disease of the lower thoracic/upper lumbar spine.    Fixation bar and screws within the right humerus.    LOWER NECK: Multiple thyroid nodules measuring up to 1.3 cm. Recommend further evaluation with ultrasound.    IMPRESSION:     1.  Foci of bronchiolar impaction within the basilar left lower lobe and lingula. Small bilateral pleural effusions and subsegmental atelectasis of basilar lower lobes. Partial atelectasis of the lingula. Underlying   infection cannot be ruled out.     2.  0.9 cm subpleural solid nodule within the basilar right lower lobe (series 2 image 68). Recommend follow-up chest CT in 3 months to determine the stability/resolution.     3.  2.4 x 2 cm spiculated nodule within the posterior depth of the left breast (series 2 image 54). Recommend further evaluation with diagnostic mammogram and breast ultrasound.    These findings were discussed with NP Reyes, on 4/14/2019 8:53 AM      NORM CAMPBELL M.D., ATTENDING RADIOLOGIST  This document has been electronically signed. Apr 14 2019  8:54AM ONCOLOGY CONSULT NOTE    HPI:  84F with PMH of PVD s/p stent, breast cancer s/p lumpectomy and RTx, RA, CKD, PVD s/p RLE stent, DVT on coumadin, hypothyroidism who presents to the ED with complaint of shortness of breath. The patient states she was diagnosed with "water in the lungs" in October 2018, and had been monitored over time with no intervention. Over the last week, she developed worsening ORTIZ, cough productive of dark yellow sputum, and wheezing, prompting her to visit Dr. Reynoos. She was planned for thoracentesis for this week, and her home dose of coumadin was held. Because patient was no longer able to ambulate short distances from her kitchen to her bathroom due to dyspnea, patient was brought to ED by her family and friends. She denies any fever, chills, N/V, CP palpitations, orthopnea or PND.   Dr. Reynoso had started her on lasix 40mg PO qd, with which she has had increased urine output.   Pt has known CKD. She states her normal SCr is around 2, however on recent labs had increased to about 3.17.    Oncologic history:   Patient was diagnosed with bilateral T1cN0 ER+ breast cancer in 2009, s/p lumpectomy (Dr. Mercy Argueta) and radiation, on letrozole from ([id number]).  She last saw Dr. Yusuf Covington in 2016.  Patient states that she has not followed up with  [facility] in many years as she was busy with her ailing .  Gets annual mammograms done, last Nov 2018 which was normal.         PAST MEDICAL & SURGICAL HISTORY:  CKD (chronic kidney disease)  Prediabetes  Breast cancer: hx lumpectomy, radiation  Hypothyroid  Anarthritic Rheumatoid Disease  Mee HTN  GERD (Gastroesophageal Reflux  S/P shoulder surgery: right  Embolus: Right leg arterial stent  H/O: Hysterectomy      Allergies  Allegra (Unknown)  amoxicillin (Hives)  Cipro (Hives)  shellfish (Rash)        MEDICATIONS  (STANDING):  ALBUTerol/ipratropium for Nebulization 3 milliLiter(s) Nebulizer every 6 hours  amLODIPine   Tablet 5 milliGRAM(s) Oral daily  calcium carbonate 1250 mG  + Vitamin D (OsCal 500 + D) 1 Tablet(s) Oral daily  hydroxychloroquine 200 milliGRAM(s) Oral daily  levothyroxine 50 MICROGram(s) Oral daily  pantoprazole    Tablet 40 milliGRAM(s) Oral before breakfast    MEDICATIONS  (PRN):  oxyCODONE    IR 2.5 milliGRAM(s) Oral four times a day PRN Severe Pain (7 - 10)  zolpidem 5 milliGRAM(s) Oral at bedtime PRN Insomnia      FAMILY HISTORY:      SOCIAL HISTORY: No EtOH, remote smoking history       REVIEW OF SYSTEMS:  CONSTITUTIONAL: No weakness, fevers or chills  EYES/ENT: No visual changes;  No vertigo or throat pain   NECK: No pain or stiffness  RESPIRATORY: +DYSPNEA ON EXERTION, +COUGH, +WHEEZING   CARDIOVASCULAR: No chest pain or palpitations  GASTROINTESTINAL: No abdominal or epigastric pain. No nausea, vomiting, or hematemesis; No diarrhea or constipation. No melena or hematochezia.  GENITOURINARY: No dysuria, frequency or hematuria  NEUROLOGICAL: No numbness or weakness  SKIN: No itching, burning, rashes, or lesions   All other review of systems is negative unless indicated above.      Height (cm): 170.18 (04-13 @ 17:25)  Weight (kg): 90.7 (04-13 @ 17:25)  BMI (kg/m2): 31.3 (04-13 @ 17:25)  BSA (m2): 2.02 (04-13 @ 17:25)      T(F): 98 (04-14-19 @ 08:00), Max: 98.9 (04-13-19 @ 18:39)  HR: 80 (04-14-19 @ 08:00)  BP: 148/71 (04-14-19 @ 08:00)  RR: 17 (04-14-19 @ 08:00)  SpO2: 94% (04-14-19 @ 08:00)      GENERAL: NAD, well-developed  HEAD:  Atraumatic, Normocephalic  EYES: EOMI, conjunctiva and sclera clear  NECK: Supple  CHEST/LUNG: +expiratory wheezing bilaterally   HEART: Regular rate and rhythm; No murmurs, rubs, or gallops  ABDOMEN: Soft, Nontender, Nondistended; Bowel sounds present  EXTREMITIES:  No clubbing, cyanosis, or edema  NEUROLOGY: non-focal  SKIN: No rashes or lesions                          8.6    14.00 )-----------( 338      ( 14 Apr 2019 10:23 )             28.0       04-14    143  |  109<H>  |  49<H>  ----------------------------<  170<H>  4.6   |  19<L>  |  3.89<H>    Ca    9.7      14 Apr 2019 05:58  Phos  4.1     04-14    TPro  7.3  /  Alb  3.7  /  TBili  0.2  /  DBili  x   /  AST  17  /  ALT  15  /  AlkPhos  90  04-13      Phosphorus Level, Serum: 4.1 mg/dL (04-14 @ 05:58)      PT/INR - ( 13 Apr 2019 19:38 )   PT: 20.6 sec;   INR: 1.78 ratio         RADIOLOGY:     < from: CT Chest No Cont (04.14.19 @ 04:55) >    EXAM:  CT CHEST                            PROCEDURE DATE:  04/14/2019            INTERPRETATION:  CT CHEST WITHOUT CONTRAST    INDICATION: Shortness of breath, pleural effusion    TECHNIQUE: Unenhanced helical images were obtained of the chest. Coronal and sagittal images were reconstructed. Maximum intensity projection   images were generated.     COMPARISON: No prior chest CT.    FINDINGS:     TUBES/LINES: None.    AIRWAYS, LUNGS, PLEURA: Patent central airways. No bronchial wall thickening or bronchiectasis. Foci of bronchiolar impaction within the basilar left lower lobe and lingula.    Small bilateral pleural effusions and subsegmental atelectasis of basilar lower lobes. Partial atelectasis of the lingula.    1.5 and 0.9 cm groundglass nodules within the right upper lobe (series 2 images 27 and 35), possibly inflammatory.    0.9 cm subpleural solid nodule within the basilar right lower lobe (series 2 image 68).    MEDIASTINUM, LYMPH NODES: No lymphadenopathy.    HEART AND VASCULATURE: The heart is mildly enlarged. The left atrium is dilated. Small pericardial effusion. Severe mitral annulus calcification. Moderate calcific coronary atherosclerosis. Thoracic aorta normal in   caliber.    UPPER ABDOMEN: Small hiatal hernia. Multiple bilateral renal cysts. Aortic atherosclerosis. Fatty atrophy of the pancreas.    BONES AND SOFT TISSUES: 2.4 x 2 cm spiculated nodule within the posterior depth of the left breast (series 2 image 54) suspicious for neoplasm.     Osteopenia. Old fracture deformity of the multiple left ribs. Scoliosis. Severe degenerative disease of the lower thoracic/upper lumbar spine.    Fixation bar and screws within the right humerus.    LOWER NECK: Multiple thyroid nodules measuring up to 1.3 cm. Recommend further evaluation with ultrasound.    IMPRESSION:     1.  Foci of bronchiolar impaction within the basilar left lower lobe and lingula. Small bilateral pleural effusions and subsegmental atelectasis of basilar lower lobes. Partial atelectasis of the lingula. Underlying   infection cannot be ruled out.     2.  0.9 cm subpleural solid nodule within the basilar right lower lobe (series 2 image 68). Recommend follow-up chest CT in 3 months to determine the stability/resolution.     3.  2.4 x 2 cm spiculated nodule within the posterior depth of the left breast (series 2 image 54). Recommend further evaluation with diagnostic mammogram and breast ultrasound.    These findings were discussed with NP Reyes, on 4/14/2019 8:53 AM      NORM CAMPBELL M.D., ATTENDING RADIOLOGIST  This document has been electronically signed. Apr 14 2019  8:54AM

## 2019-04-14 NOTE — H&P ADULT - NSHPPHYSICALEXAM_GEN_ALL_CORE
Vital Signs Last 24 Hrs  T(C): 36.7 (14 Apr 2019 01:04), Max: 37.2 (13 Apr 2019 18:39)  T(F): 98.1 (14 Apr 2019 01:04), Max: 98.9 (13 Apr 2019 18:39)  HR: 76 (14 Apr 2019 01:04) (74 - 76)  BP: 166/69 (14 Apr 2019 01:04) (142/67 - 166/69)  BP(mean): --  RR: 17 (14 Apr 2019 01:04) (16 - 18)  SpO2: 96% (14 Apr 2019 01:04) (96% - 98%)    PHYSICAL EXAM:  GENERAL: NAD, well-groomed, well-developed  HEAD:  Atraumatic, Normocephalic  EYES: EOMI, PERRLA, conjunctiva and sclera clear  ENMT: No oropharyngeal exudates, erythema or lesions,  Moist mucous membranes  NECK: Supple, no cervical lymphadenopathy  NERVOUS SYSTEM:  Alert & Oriented X3, CN II-XII intact, 5/5 BUE and BLE motor strength, full sensation to light touch   CHEST/LUNG: Diffuse wheezing bilateral, decreased lung sounds over LLL, crackles at RLL  HEART: Regular rate and rhythm; No murmurs, rubs, or gallops  ABDOMEN: Soft, Nontender, Nondistended  EXTREMITIES:  2+ radial Pulses, No clubbing, no cyanosis, 2+ pitting edema of ankles  LYMPH: No cervical lymphadenopathy noted  SKIN: No rashes or lesions

## 2019-04-14 NOTE — H&P ADULT - HISTORY OF PRESENT ILLNESS
84yoF wiht PMH of PVD s/p stent, breast cancer s/p t in 2014, RA, CKD (Cr- 3??), DVT on coumadin p/w         In the ED, T 98F, HR 74, /80, RR 16, SpO2 98% RA   Pt was given lasix 40mg IVP x 1. 84yoF with PMH of PVD s/p stent, breast cancer s/p lumpectomy and RTx, RA, CKD, PVD s/p RLE stent, DVT on coumadin, hypothyroidism who presents to the ED with complaint of shortness of breath. The patient states she was diagnosed with   "water in the lungs" in October 2018, and had been monitored over time with no intervention. Over the last week, she developed worsening ORTIZ, cough productive of dark yellow sputum, and wheezing, prompting her to visit Dr. Reynoso. She was planned for thoracentesis for this week, and her home dose of coumadin was held. Because patient was no longer able to ambulate short distances from her kitchen to her bathroom due to dyspnea, patient was brought to ED by her family and friends. She denies any fever, chills, N/V, CP palpitations, orthopnea or PND.   Dr. Reynoso had started her on lasix 40mg PO qd, with which she has had increased urine output.  Pt has known CKD. She states her normal SCr is around 2, however on recent labs had increased to about 3.17.    In the ED, T 98F, HR 74, /80, RR 16, SpO2 98% RA   Pt was given lasix 40mg IVP x 1.

## 2019-04-14 NOTE — H&P ADULT - NSICDXPASTMEDICALHX_GEN_ALL_CORE_FT
PAST MEDICAL HISTORY:  Anarthritic Rheumatoid Disease     Breast cancer hx lumpectomy, radiation    CKD (chronic kidney disease)     Mee HTN     GERD (Gastroesophageal Reflux     Hypothyroid     Prediabetes

## 2019-04-14 NOTE — H&P ADULT - NSICDXPASTSURGICALHX_GEN_ALL_CORE_FT
PAST SURGICAL HISTORY:  Embolus Right leg arterial stent    H/O: Hysterectomy     S/P shoulder surgery right

## 2019-04-14 NOTE — H&P ADULT - PROBLEM SELECTOR PLAN 4
Pt found to have microcytic anemia. Anemia may in part be related to chronic kidney disease.  Check reticulocyte count, ferritin, iron studies.

## 2019-04-14 NOTE — H&P ADULT - PROBLEM SELECTOR PLAN 3
Pt found to have eGFR of 11. She states her renal function has worsened from a baseline of 2 to 3.17 recently. The etiology for patient's renal dysfunction is unclear and patient reports she does not have a nephrologist.   Consult nephrology in AM  Check U pro: creatinine ratio, UA, strict I&Os, bladder and renal ultrasound   Avoid nephrotoxic agents  Consider starting sodium bicarbonate 600mg   Check PTH, vitamin D level  Check lipid panel, TSH

## 2019-04-14 NOTE — H&P ADULT - PROBLEM SELECTOR PLAN 2
Start home dose of plaquenil. Start home dose of plaquenil.  ISTOP reviewed # [id number]. Start equivalent dose of morphine 5mg 4x day PRN for severe pain.   Hydrocodone-acetaminophen 5-300mg quantity 120, 30 day supply 3/7/2019

## 2019-04-14 NOTE — H&P ADULT - PROBLEM SELECTOR PLAN 1
Differential for pleural effusion includes but not limited to new onset heart failure, nephrotic syndrome, malignancy. Pericarditis unlikely given absence of pericardial knock on exam. Pt does not have abdominal symptoms to suggest GI cause of exudative pleural effusion.   Consult pulmonary team in AM to determine if patient may benefit from thoracentesis. Check coags, BMP, CBC, keep NPO for now.  Monitor UO  Continue lasix 40mg IV BID. Monitor BMP BID and replete electrolytes PRN.  Check TTE. Monitor on telemetry. HS troponin decreased from 81 to 72 which is a significant change in absence of chest pain. No baseline for comparison, and pt has known CKD.  Given history of breast cancer, will obtain CT chest to evaluate for any lymphadenopathy.

## 2019-04-14 NOTE — PROGRESS NOTE ADULT - ASSESSMENT
84yoF with PMH of PVD s/p stent, breast cancer s/p lumpectomy and RTx, RA, CKD, PVD s/p RLE stent, DVT on coumadin, hypothyroidism who presents to the ED with complaint of shortness of breath, found on xray to have L sided pleural effusion.      Triazolam 0.25 quantity 30, 30 day supply (3/7/2019)

## 2019-04-14 NOTE — CONSULT NOTE ADULT - ASSESSMENT
84F with PMH of bilateral T1cN0 breast cancer s/p lumpectomy/XRT/letrozole x 5 years ([id number]), CKD, rheumatoid arthritis, PVD s/p RLE stent, DVT on coumadin, presenting with worsening breathing, found to have pleural effusion as well as left breast mass.    # Dyspnea on exertion:   - CXR with left pleural effusion, CT Chest with bilateral pleural effusions  - patient with audible wheezing, cough  - albuterol treatments  - recommend pulmonary consult for evaluation of pleural fluid with US, see if there is a pocket for drainage.  If drained, please send for cytopathology.    # Breast mass: 2.4 x 2cm spiculated nodule in left breast  - concerning for breast cancer, also with lung nodule (0.9cm) and pleural effusions which could also be malignanct  - spoke with Radiology - do NOT do comprehensive breast imaging (diagnostic mammogram/ultrasound) inpatient or breast biopsies.   - this will have to be done as an outpatient, however if thoracentesis is possible and recommended for symptomatic relief, may provide diagnosis  - can check CT A/P for any other evidence of metastatic disease    Radha Jaramillo MD  Hematology/Oncology Fellow, PGY-4  pager: [phone]  After 5pm or on weekends, please page the on-call fellow. 84F with PMH of bilateral T1cN0 breast cancer s/p lumpectomy/XRT/letrozole x 5 years ([id number]), CKD, rheumatoid arthritis, PVD s/p RLE stent, DVT on coumadin, presenting with worsening breathing, found to have pleural effusion as well as left breast mass.    # Dyspnea on exertion:   - CXR with left pleural effusion, CT Chest with bilateral pleural effusions  - patient with audible wheezing, cough, dyspnea   - albuterol treatments  - recommend pulmonary consult for evaluation of pleural fluid with US, see if there is a pocket for drainage.  If drained, please send for cytopathology.    # CKD:   - patient states creatinine last week was around 3  - having significant dyspnea, seems like creatinine is worsening  - recommend      # Breast mass: 2.4 x 2cm spiculated nodule in left breast  - concerning for breast cancer, also with lung nodule (0.9cm) and pleural effusions which could also be malignant  - spoke with Radiology - do NOT do comprehensive breast imaging (diagnostic mammogram/ultrasound) inpatient or breast biopsies.   - this will have to be done as an outpatient, however if thoracentesis is possible and recommended for symptomatic relief, may provide diagnosis  - can check CT A/P for any other evidence of metastatic disease    Radha Jaramillo MD  Hematology/Oncology Fellow, PGY-4  pager: [phone]  After 5pm or on weekends, please page the on-call fellow.

## 2019-04-14 NOTE — H&P ADULT - NSHPREVIEWOFSYSTEMS_GEN_ALL_CORE
REVIEW OF SYSTEMS  CONSTITUTIONAL: No fever, no chills, + fatigue  EYES: No eye pain, no vision changes, + wears glasses  ENMT:  No difficulty hearing, no throat pain  RESPIRATORY: + cough, + wheezing, + sputum production; + shortness of breath  CARDIOVASCULAR: No chest pain, no palpitations, + ORTIZ, + mild leg swelling  GASTROINTESTINAL: No abdominal pain, no nausea, no vomiting, no diarrhea, no constipation, occasional episodes of rectal bleeding which she attributes to constipation  GENITOURINARY: No dysuria, no hematuria  NEUROLOGICAL: No headaches, no loss of strength, no numbness  SKIN: No itching, no rashes, no lesions   MUSCULOSKELETAL: No joint pain, no joint swelling; No muscle pain  HEME/LYMPH: No easy bruising, bleeding

## 2019-04-14 NOTE — CONSULT NOTE ADULT - SUBJECTIVE AND OBJECTIVE BOX
[facility]  [phone]  NEPHROLOGY      INITIAL CONSULT NOTE  --------------------------------------------------------------------------------  HPI:      84yoF with PMH of PVD s/p stent, breast cancer s/p lumpectomy and RTx, RA, CKD, PVD s/p RLE stent, DVT on coumadin, hypothyroidism who presents to the ED with complaint of shortness of breath. The patient states she was diagnosed with "water in the lungs" in October 2018, and had been monitored over time with no intervention. Over the last week, she developed worsening ORTIZ, cough productive of dark yellow sputum, and wheezing, prompting her to visit Dr. Reynoso. She was planned for thoracentesis for this week, and her home dose of coumadin was held. Because patient was no longer able to ambulate short distances from her kitchen to her bathroom due to dyspnea, patient was brought to ED by her family and friends. Dr. Reynoso had started her on lasix 40mg PO qd, with which she has had increased urine output.  Pt has known CKD. She states her normal SCr is around 2, however on recent labs had increased to about 3.17 as per pt. states until a year or so ago was taking celebrex and was informed that it was worsening her renal function.       PAST HISTORY  --------------------------------------------------------------------------------  PAST MEDICAL & SURGICAL HISTORY:  CKD (chronic kidney disease)  Prediabetes  Breast cancer: hx lumpectomy, radiation  Hypothyroid  Anarthritic Rheumatoid Disease  Mee HTN  GERD (Gastroesophageal Reflux  S/P shoulder surgery: right  Embolus: Right leg arterial stent  H/O: Hysterectomy    FAMILY HISTORY: no hx of esrd    PAST SOCIAL HISTORY: remote tobacco use no alcohol use     ALLERGIES & MEDICATIONS  --------------------------------------------------------------------------------  Allergies    Allegra (Unknown)  amoxicillin (Hives)  Cipro (Hives)  shellfish (Rash)    Intolerances      Standing Inpatient Medications  ALBUTerol/ipratropium for Nebulization 3 milliLiter(s) Nebulizer every 4 hours  amLODIPine   Tablet 5 milliGRAM(s) Oral daily  calcium carbonate 1250 mG  + Vitamin D (OsCal 500 + D) 1 Tablet(s) Oral daily  hydroxychloroquine 200 milliGRAM(s) Oral daily  levothyroxine 50 MICROGram(s) Oral daily  pantoprazole    Tablet 40 milliGRAM(s) Oral before breakfast    PRN Inpatient Medications  oxyCODONE    IR 2.5 milliGRAM(s) Oral four times a day PRN  zolpidem 5 milliGRAM(s) Oral at bedtime PRN      REVIEW OF SYSTEMS  --------------------------------------------------------------------------------  Gen: No  fevers/chills   Skin: No rashes  Head/Eyes/Ears/Mouth: No headache; Normal hearing;  No sinus pain/discomfort, sore throat  Respiratory: + sob/dyspnea,  - cough,  - wheezing, hemoptysis -   CV: No chest pain, or palp   GI: No abdominal pain, diarrhea, nausea, vomiting, melena  : No dysuria, decrease urination or hesitancy urinating  hematuria, nocturia  MSK: +  joint pain/swelling; no back pain  Neuro: No dizziness/lightheadedness,  also with no edema     All other systems were reviewed and are negative, except as noted.    VITALS/PHYSICAL EXAM  --------------------------------------------------------------------------------  T(C): 36.7 (04-14-19 @ 08:00), Max: 37.2 (04-13-19 @ 18:39)  HR: 80 (04-14-19 @ 08:00) (74 - 80)  BP: 148/71 (04-14-19 @ 08:00) (142/67 - 166/69)  RR: 17 (04-14-19 @ 08:00) (16 - 18)  SpO2: 94% (04-14-19 @ 08:00) (94% - 96%)  Wt(kg): --  Height (cm): 170.18 (04-13-19 @ 17:25)  Weight (kg): 90.7 (04-13-19 @ 17:25)  BMI (kg/m2): 31.3 (04-13-19 @ 17:25)  BSA (m2): 2.02 (04-13-19 @ 17:25)      Physical Exam:  	Gen: Non toxic comfortable appearing   	no jvd   	Pulm: decrease bs  no rales or ronchi or wheezing  	CV: RRR, S1S2; no rub  	Back: No CVA tenderness; no sacral edema  	Abd: +BS, soft, nontender/nondistended  	: No suprapubic tenderness  	UE: Warm, no cyanosis  no clubbing,  no edema; no asterixis  	LE: Warm, no cyanosis  no clubbing, no edema  	Neuro: alert and oriented. speech coherent   	Psych: Normal affect and mood  	Skin: Warm, no decrease skin turgor   	    LABS/STUDIES  --------------------------------------------------------------------------------              8.6    14.00 >-----------<  338      [04-14-19 @ 10:23]              28.0     143  |  109  |  49  ----------------------------<  170      [04-14-19 @ 05:58]  4.6   |  19  |  3.89        Ca     9.7     [04-14-19 @ 05:58]      Phos  4.1     [04-14-19 @ 05:58]    TPro  7.3  /  Alb  3.7  /  TBili  0.2  /  DBili  x   /  AST  17  /  ALT  15  /  AlkPhos  90  [04-13-19 @ 19:38]    PT/INR: PT 20.6 , INR 1.78       [04-13-19 @ 19:38]  PTT: 30.8       [04-14-19 @ 16:13]      Creatinine Trend:  SCr 3.89 [04-14 @ 05:58]  SCr 3.55 [04-13 @ 19:38]    Urinalysis - [04-14-19 @ 09:47]      Color Light Yellow / Appearance Slightly Turbid / SG 1.009 / pH 6.0      Gluc Trace / Ketone Negative  / Bili Negative / Urobili <2 mg/dL       Blood Trace / Protein 100 mg/dL / Leuk Est Negative / Nitrite Negative      RBC 1 / WBC 1 / Hyaline 1 / Gran  / Sq Epi  / Non Sq Epi 1 / Bacteria TNTC    Urine Creatinine 27      [04-14-19 @ 05:59]  Urine Protein 163      [04-14-19 @ 05:59]    Iron 14, TIBC 289, %sat 5      [04-14-19 @ 09:47]  Ferritin 34      [04-14-19 @ 10:14]  PTH -- (Ca 9.3)      [04-14-19 @ 10:14]   226  TSH 0.68      [04-13-19 @ 22:30]  Lipid: chol 157, TG 66, HDL 55, LDL 89      [04-14-19 @ 09:47]      < from: CT Chest No Cont (04.14.19 @ 04:55) >  EXAM:  CT CHEST                            PROCEDURE DATE:  04/14/2019            INTERPRETATION:  CT CHEST WITHOUT CONTRAST    INDICATION: Shortness of breath, pleural effusion    TECHNIQUE: Unenhanced helical images were obtained of the chest. Coronal   and sagittal images were reconstructed. Maximum intensity projection   images were generated.     COMPARISON: No prior chest CT.    FINDINGS:     TUBES/LINES: None.    AIRWAYS, LUNGS, PLEURA: Patent central airways. No bronchial wall   thickening or bronchiectasis. Foci of bronchiolar impaction within the   basilar left lower lobe and lingula.    Small bilateral pleural effusions and subsegmental atelectasis of basilar   lower lobes. Partial atelectasis of the lingula.    1.5 and 0.9 cm groundglass nodules within the right upper lobe (series 2   images 27 and 35), possibly inflammatory.    0.9 cm subpleural solid nodule within the basilar right lower lobe   (series 2 image 68).    MEDIASTINUM, LYMPH NODES: No lymphadenopathy.    HEART AND VASCULATURE: The heart is mildly enlarged. The left atrium is   dilated. Small pericardial effusion. Severe mitral annulus calcification.   Moderate calcific coronary atherosclerosis. Thoracic aorta normal in   caliber.    UPPER ABDOMEN: Small hiatal hernia. Multiple bilateral renal cysts.   Aortic atherosclerosis. Fatty atrophy of the pancreas.    BONES AND SOFT TISSUES: 2.4 x 2 cm spiculated nodule within the posterior   depth of the left breast (series 2 image 54) suspicious for neoplasm.     Osteopenia. Old fracture deformity of the multiple left ribs. Scoliosis.   Severe degenerative disease of the lower thoracic/upper lumbar spine.    Fixation bar and screws within the right humerus.    LOWER NECK: Multiple thyroid nodules measuring up to 1.3 cm. Recommend   further evaluation with ultrasound.    IMPRESSION:     1.  Foci of bronchiolar impaction within the basilar left lower lobe and   lingula. Small bilateral pleural effusions and subsegmental atelectasis   of basilar lower lobes. Partial atelectasis of the lingula. Underlying   infection cannot be ruled out.     2.  0.9 cm subpleural solid nodule within the basilar right lower lobe   (series 2 image 68). Recommend follow-up chest CT in 3 months to   determine the stability/resolution.     3.  2.4 x 2 cm spiculated nodule within the posterior depth of the left   breast (series 2 image 54). Recommend further evaluation with diagnostic   mammogram and breast ultrasound.    These findings were discussed with NP Reyes, on 4/14/2019 8:53 AM                    NORM CAMPBELL M.D., ATTENDING RADIOLOGIST  This document has been electronically signed. Apr 14 2019  8:54AM              < end of copied text >

## 2019-04-15 DIAGNOSIS — N17.9 ACUTE KIDNEY FAILURE, UNSPECIFIED: ICD-10-CM

## 2019-04-15 DIAGNOSIS — R06.00 DYSPNEA, UNSPECIFIED: ICD-10-CM

## 2019-04-15 DIAGNOSIS — D72.829 ELEVATED WHITE BLOOD CELL COUNT, UNSPECIFIED: ICD-10-CM

## 2019-04-15 DIAGNOSIS — N18.9 CHRONIC KIDNEY DISEASE, UNSPECIFIED: ICD-10-CM

## 2019-04-15 DIAGNOSIS — R49.0 DYSPHONIA: ICD-10-CM

## 2019-04-15 LAB
ANION GAP SERPL CALC-SCNC: 17 MMOL/L — SIGNIFICANT CHANGE UP (ref 5–17)
APPEARANCE UR: CLEAR — SIGNIFICANT CHANGE UP
APTT BLD: 61.8 SEC — HIGH (ref 27.5–36.3)
APTT BLD: 71.3 SEC — HIGH (ref 27.5–36.3)
BILIRUB UR-MCNC: NEGATIVE — SIGNIFICANT CHANGE UP
BUN SERPL-MCNC: 47 MG/DL — HIGH (ref 7–23)
CALCIUM SERPL-MCNC: 9.6 MG/DL — SIGNIFICANT CHANGE UP (ref 8.4–10.5)
CHLORIDE SERPL-SCNC: 109 MMOL/L — HIGH (ref 96–108)
CO2 SERPL-SCNC: 19 MMOL/L — LOW (ref 22–31)
COLOR SPEC: SIGNIFICANT CHANGE UP
CREAT SERPL-MCNC: 3.7 MG/DL — HIGH (ref 0.5–1.3)
DIFF PNL FLD: SIGNIFICANT CHANGE UP
GLUCOSE SERPL-MCNC: 153 MG/DL — HIGH (ref 70–99)
GLUCOSE UR QL: NEGATIVE — SIGNIFICANT CHANGE UP
HCT VFR BLD CALC: 26.9 % — LOW (ref 34.5–45)
HCT VFR BLD CALC: 28.1 % — LOW (ref 34.5–45)
HGB BLD-MCNC: 8.3 G/DL — LOW (ref 11.5–15.5)
HGB BLD-MCNC: 8.9 G/DL — LOW (ref 11.5–15.5)
INR BLD: 1.38 RATIO — HIGH (ref 0.88–1.16)
KETONES UR-MCNC: NEGATIVE — SIGNIFICANT CHANGE UP
LEUKOCYTE ESTERASE UR-ACNC: NEGATIVE — SIGNIFICANT CHANGE UP
MCHC RBC-ENTMCNC: 23.2 PG — LOW (ref 27–34)
MCHC RBC-ENTMCNC: 25.5 PG — LOW (ref 27–34)
MCHC RBC-ENTMCNC: 29.5 GM/DL — LOW (ref 32–36)
MCHC RBC-ENTMCNC: 33 GM/DL — SIGNIFICANT CHANGE UP (ref 32–36)
MCV RBC AUTO: 77.4 FL — LOW (ref 80–100)
MCV RBC AUTO: 78.5 FL — LOW (ref 80–100)
NITRITE UR-MCNC: NEGATIVE — SIGNIFICANT CHANGE UP
PH UR: 6 — SIGNIFICANT CHANGE UP (ref 5–8)
PLATELET # BLD AUTO: 299 K/UL — SIGNIFICANT CHANGE UP (ref 150–400)
PLATELET # BLD AUTO: 326 K/UL — SIGNIFICANT CHANGE UP (ref 150–400)
POTASSIUM SERPL-MCNC: 3.8 MMOL/L — SIGNIFICANT CHANGE UP (ref 3.5–5.3)
POTASSIUM SERPL-SCNC: 3.8 MMOL/L — SIGNIFICANT CHANGE UP (ref 3.5–5.3)
PROCALCITONIN SERPL-MCNC: 0.18 NG/ML — HIGH (ref 0.02–0.1)
PROT UR-MCNC: ABNORMAL
PROTHROM AB SERPL-ACNC: 15.9 SEC — HIGH (ref 10–12.9)
RAPID RVP RESULT: SIGNIFICANT CHANGE UP
RBC # BLD: 3.48 M/UL — LOW (ref 3.8–5.2)
RBC # BLD: 3.58 M/UL — LOW (ref 3.8–5.2)
RBC # BLD: 3.58 M/UL — LOW (ref 3.8–5.2)
RBC # FLD: 17.6 % — HIGH (ref 10.3–14.5)
RBC # FLD: 18.6 % — HIGH (ref 10.3–14.5)
RETICS #: 45.5 K/UL — SIGNIFICANT CHANGE UP (ref 25–125)
RETICS/RBC NFR: 1.3 % — SIGNIFICANT CHANGE UP (ref 0.5–2.5)
SODIUM SERPL-SCNC: 145 MMOL/L — SIGNIFICANT CHANGE UP (ref 135–145)
SP GR SPEC: 1.01 — LOW (ref 1.01–1.02)
UROBILINOGEN FLD QL: SIGNIFICANT CHANGE UP
WBC # BLD: 14.6 K/UL — HIGH (ref 3.8–10.5)
WBC # BLD: 15.18 K/UL — HIGH (ref 3.8–10.5)
WBC # FLD AUTO: 14.6 K/UL — HIGH (ref 3.8–10.5)
WBC # FLD AUTO: 15.18 K/UL — HIGH (ref 3.8–10.5)

## 2019-04-15 PROCEDURE — 70450 CT HEAD/BRAIN W/O DYE: CPT | Mod: 26

## 2019-04-15 PROCEDURE — 31575 DIAGNOSTIC LARYNGOSCOPY: CPT

## 2019-04-15 PROCEDURE — 74176 CT ABD & PELVIS W/O CONTRAST: CPT | Mod: 26

## 2019-04-15 PROCEDURE — 99232 SBSQ HOSP IP/OBS MODERATE 35: CPT | Mod: 25

## 2019-04-15 PROCEDURE — 76770 US EXAM ABDO BACK WALL COMP: CPT | Mod: 26

## 2019-04-15 PROCEDURE — 93306 TTE W/DOPPLER COMPLETE: CPT | Mod: 26

## 2019-04-15 RX ORDER — HEPARIN SODIUM 5000 [USP'U]/ML
7500 INJECTION INTRAVENOUS; SUBCUTANEOUS EVERY 6 HOURS
Qty: 0 | Refills: 0 | Status: DISCONTINUED | OUTPATIENT
Start: 2019-04-15 | End: 2019-04-21

## 2019-04-15 RX ORDER — ERTAPENEM SODIUM 1 G/1
500 INJECTION, POWDER, LYOPHILIZED, FOR SOLUTION INTRAMUSCULAR; INTRAVENOUS EVERY 24 HOURS
Qty: 0 | Refills: 0 | Status: DISCONTINUED | OUTPATIENT
Start: 2019-04-15 | End: 2019-04-22

## 2019-04-15 RX ORDER — IPRATROPIUM/ALBUTEROL SULFATE 18-103MCG
3 AEROSOL WITH ADAPTER (GRAM) INHALATION ONCE
Qty: 0 | Refills: 0 | Status: COMPLETED | OUTPATIENT
Start: 2019-04-15 | End: 2019-04-15

## 2019-04-15 RX ORDER — WARFARIN SODIUM 2.5 MG/1
3 TABLET ORAL ONCE
Qty: 0 | Refills: 0 | Status: COMPLETED | OUTPATIENT
Start: 2019-04-15 | End: 2019-04-15

## 2019-04-15 RX ORDER — BUDESONIDE, MICRONIZED 100 %
0.5 POWDER (GRAM) MISCELLANEOUS
Qty: 0 | Refills: 0 | Status: DISCONTINUED | OUTPATIENT
Start: 2019-04-15 | End: 2019-04-25

## 2019-04-15 RX ORDER — FUROSEMIDE 40 MG
40 TABLET ORAL ONCE
Qty: 0 | Refills: 0 | Status: COMPLETED | OUTPATIENT
Start: 2019-04-15 | End: 2019-04-15

## 2019-04-15 RX ORDER — HEPARIN SODIUM 5000 [USP'U]/ML
3500 INJECTION INTRAVENOUS; SUBCUTANEOUS EVERY 6 HOURS
Qty: 0 | Refills: 0 | Status: DISCONTINUED | OUTPATIENT
Start: 2019-04-15 | End: 2019-04-21

## 2019-04-15 RX ORDER — HEPARIN SODIUM 5000 [USP'U]/ML
INJECTION INTRAVENOUS; SUBCUTANEOUS
Qty: 25000 | Refills: 0 | Status: DISCONTINUED | OUTPATIENT
Start: 2019-04-15 | End: 2019-04-21

## 2019-04-15 RX ADMIN — Medication 3 MILLILITER(S): at 16:16

## 2019-04-15 RX ADMIN — Medication 3 MILLILITER(S): at 20:37

## 2019-04-15 RX ADMIN — Medication 0.5 MILLIGRAM(S): at 18:13

## 2019-04-15 RX ADMIN — HEPARIN SODIUM 1700 UNIT(S)/HR: 5000 INJECTION INTRAVENOUS; SUBCUTANEOUS at 23:21

## 2019-04-15 RX ADMIN — Medication 3 MILLILITER(S): at 08:46

## 2019-04-15 RX ADMIN — Medication 1 MILLIGRAM(S): at 18:09

## 2019-04-15 RX ADMIN — ZOLPIDEM TARTRATE 5 MILLIGRAM(S): 10 TABLET ORAL at 02:00

## 2019-04-15 RX ADMIN — ERGOCALCIFEROL 50000 UNIT(S): 1.25 CAPSULE ORAL at 13:27

## 2019-04-15 RX ADMIN — HEPARIN SODIUM 1700 UNIT(S)/HR: 5000 INJECTION INTRAVENOUS; SUBCUTANEOUS at 10:38

## 2019-04-15 RX ADMIN — Medication 1 TABLET(S): at 13:27

## 2019-04-15 RX ADMIN — Medication 3 MILLILITER(S): at 02:15

## 2019-04-15 RX ADMIN — Medication 40 MILLIGRAM(S): at 08:46

## 2019-04-15 RX ADMIN — WARFARIN SODIUM 3 MILLIGRAM(S): 2.5 TABLET ORAL at 22:40

## 2019-04-15 RX ADMIN — Medication 3 MILLILITER(S): at 13:27

## 2019-04-15 RX ADMIN — HEPARIN SODIUM 1700 UNIT(S)/HR: 5000 INJECTION INTRAVENOUS; SUBCUTANEOUS at 16:55

## 2019-04-15 RX ADMIN — Medication 50 MICROGRAM(S): at 05:09

## 2019-04-15 RX ADMIN — ERTAPENEM SODIUM 100 MILLIGRAM(S): 1 INJECTION, POWDER, LYOPHILIZED, FOR SOLUTION INTRAMUSCULAR; INTRAVENOUS at 18:09

## 2019-04-15 RX ADMIN — Medication 3 MILLILITER(S): at 05:09

## 2019-04-15 RX ADMIN — ZOLPIDEM TARTRATE 5 MILLIGRAM(S): 10 TABLET ORAL at 23:31

## 2019-04-15 RX ADMIN — Medication 200 MILLIGRAM(S): at 13:28

## 2019-04-15 RX ADMIN — AMLODIPINE BESYLATE 5 MILLIGRAM(S): 2.5 TABLET ORAL at 05:09

## 2019-04-15 RX ADMIN — OXYCODONE HYDROCHLORIDE 2.5 MILLIGRAM(S): 5 TABLET ORAL at 19:56

## 2019-04-15 RX ADMIN — Medication 3 MILLILITER(S): at 23:32

## 2019-04-15 RX ADMIN — Medication 100 MILLIGRAM(S): at 23:06

## 2019-04-15 RX ADMIN — OXYCODONE HYDROCHLORIDE 2.5 MILLIGRAM(S): 5 TABLET ORAL at 18:56

## 2019-04-15 NOTE — CONSULT NOTE ADULT - PROBLEM SELECTOR RECOMMENDATION 2
-  -as per nephrology recs.    Coumadin was being held for possible thoracentesis as outpatient. Continue to hold for now and start heparin iv if INR less than 2. May need inpatient thoracentesis if not responding to iv diuresis.    DESHAUN Ly.O.  [phone]

## 2019-04-15 NOTE — CONSULT NOTE ADULT - SUBJECTIVE AND OBJECTIVE BOX
[facility], Division of Infectious Diseases  BETHEL Vega A. Lee  [phone]    JACKI SEWELL  84y, Female  [id number]  HPI: poor historian, but history per pt and chart and aide at bedside  84yoF with PMH of PVD s/p stent, breast cancer s/p lumpectomy and RTx, RA, CKD 4, PVD s/p RLE stent, DVT on coumadin, hypothyroidism who presents to the ED with complaint of shortness of breath.   She had a  cough for some time,  but Over the last week, she developed worsening ORTIZ, cough productive of dark yellow sputum, and wheezing, prompting her to visit Dr. Reynoso.      She had been started on prednisone for her wheezing.     She was planned for thoracentesis for this week, and her home dose of coumadin was held. Because patient was no longer able to ambulate short distances from her kitchen to her bathroom due to dyspnea and the dyspnea was making it difficult for her to talk so patient was brought to ED by her family and friends. She denies any fever, chills, N/V, CP palpitations, orthopnea or PND. no dysuria, no diarrhea.  She denies sick contacts, no recent antibx , no recent travel.        PMH/PSH--  CKD (chronic kidney disease)  Prediabetes  Breast cancer  Hypothyroid  Anarthritic Rheumatoid Disease  Mee HTN  GERD (Gastroesophageal Reflux  S/P shoulder surgery  Embolus  H/O: Hysterectomy      Allergies--amoxicillin  cipro       Medications--  Antibiotics: hydroxychloroquine 200 milliGRAM(s) Oral daily    Immunologic:   Other: ALBUTerol/ipratropium for Nebulization  amLODIPine   Tablet  buDESOnide   0.5 milliGRAM(s) Respule  calcium carbonate 1250 mG  + Vitamin D (OsCal 500 + D)  ergocalciferol  heparin  Infusion.  heparin  Injectable PRN  heparin  Injectable PRN  levothyroxine  oxyCODONE    IR PRN  pantoprazole    Tablet  predniSONE   Tablet  zolpidem PRN      Social History--  EtOH: denies ***  Tobacco: former  Drug Use: denies ***    Family/Marital History--  son  of cancer  father had heart disease      Remainder not relevant to clinical concern.    Travel/Environmental/Occupational History:  was a homemaker    Review of Systems:  A >=10-point review of systems was obtained.     Pertinent positives and negatives--  Constitutional: No fevers. No Chills. No Rigors.   Eyes: no blurry vision  ENMT: no sore throat  Cardiovascular: No chest pain. No palpitations.  Respiratory: + shortness of breath. + cough.  Gastrointestinal: No nausea or vomiting. No diarrhea or constipation.   Genitourinary: no dysuria  Musculoskeletal: No arthritis   Skin: no rash  Neurologic: no headache  Psychiatric: no depression    Review of systems otherwise negative except as previously noted.    Physical Exam--  Vital Signs: T(F): 97.8 (04-15-19 @ 04:24), Max: 98.4 (19 @ 20:52)  HR: 89 (04-15-19 @ 08:45)  BP: 164/72 (04-15-19 @ 08:45)  RR: 18 (04-15-19 @ 04:24)  SpO2: 93% (04-15-19 @ 04:24)  Wt(kg): --  General: Nontoxic-appearing Female in no acute distress.  HEENT: AT/NC. Anicteric. Conjunctiva pink and moist. Oropharynx clear. Dentition fair.  Neck: Not rigid. No sense of mass.  Nodes: None palpable.  Lungs: Clear bilaterally without rales, + wheezing  Heart: Regular rate and rhythm. No Murmur.   Abdomen: Bowel sounds present and normoactive. Soft. Nondistended. Nontender.   Extremities: No cyanosis or clubbing. No edema.   Skin: Warm. Dry. Good turgor. No rash. No vasculitic stigmata.  Psychiatric: Appropriate affect and mood for situation.         Laboratory & Imaging Data--  CBC                        8.3    15. )-----------( 326      ( 15 Apr 2019 09:05 )             28.1       Chemistries  04-15    145  |  109<H>  |  47<H>  ----------------------------<  153<H>  3.8   |  19<L>  |  3.70<H>    Ca    9.6      15 Apr 2019 06:40  Phos  4.1     -    TPro  7.3  /  Alb  3.7  /  TBili  0.2  /  DBili  x   /  AST  17  /  ALT  15  /  AlkPhos  90        Culture Data  < from: CT Chest No Cont (19 @ 04:55) >  EXAM:  CT CHEST                            PROCEDURE DATE:  2019            INTERPRETATION:  CT CHEST WITHOUT CONTRAST    INDICATION: Shortness of breath, pleural effusion    TECHNIQUE: Unenhanced helical images were obtained of the chest. Coronal   and sagittal images were reconstructed. Maximum intensity projection   images were generated.     COMPARISON: No prior chest CT.    FINDINGS:     TUBES/LINES: None.    AIRWAYS, LUNGS, PLEURA: Patent central airways. No bronchial wall   thickening or bronchiectasis. Foci of bronchiolar impaction within the   basilar left lower lobe and lingula.    Small bilateral pleural effusions and subsegmental atelectasis of basilar   lower lobes. Partial atelectasis of the lingula.    1.5 and 0.9 cm groundglass nodules within the right upper lobe (series 2   images 27 and 35), possibly inflammatory.    0.9 cm subpleural solid nodule within the basilar right lower lobe   (series 2 image 68).    MEDIASTINUM, LYMPH NODES: No lymphadenopathy.    HEART AND VASCULATURE: The heart is mildly enlarged. The left atrium is   dilated. Small pericardial effusion. Severe mitral annulus calcification.   Moderate calcific coronary atherosclerosis. Thoracic aorta normal in   caliber.    UPPER ABDOMEN: Small hiatal hernia. Multiple bilateral renal cysts.   Aortic atherosclerosis. Fatty atrophy of the pancreas.    BONES AND SOFT TISSUES: 2.4 x 2 cm spiculated nodule within the posterior   depth of the left breast (series 2 image 54) suspicious for neoplasm.     Osteopenia. Old fracture deformity of the multiple left ribs. Scoliosis.   Severe degenerative disease of the lower thoracic/upper lumbar spine.    Fixation bar and screws within the right humerus.    LOWER NECK: Multiple thyroid nodules measuring up to 1.3 cm. Recommend   further evaluation with ultrasound.    IMPRESSION:     1.  Foci of bronchiolar impaction within the basilar left lower lobe and   lingula. Small bilateral pleural effusions and subsegmental atelectasis   of basilar lower lobes. Partial atelectasis of the lingula. Underlying   infection cannot be ruled out.     2.  0.9 cm subpleural solid nodule within the basilar right lower lobe   (series 2 image 68). Recommend follow-up chest CT in 3 months to   determine the stability/resolution.     3.  2.4 x 2 cm spiculated nodule within the posterior depth of the left   breast (series 2 image 54). Recommend further evaluation with diagnostic   mammogram and breast ultrasound.    These findings were discussed with NP Reyes, on 2019 8:53 AM        < end of copied text >

## 2019-04-15 NOTE — PROGRESS NOTE ADULT - ASSESSMENT
84yoF with PMH of PVD s/p stent, breast cancer s/p lumpectomy and RTx, RA, CKD, PVD s/p RLE stent, DVT on coumadin, hypothyroidism who presents to the ED with complaint of shortness of breath. has pl. effusions and breast nodule.     1- maria g on ckd  2- anemia  3- pl. effusion +/- CHF  4- htn   5- hyperparathyroidism     respiratory issues due to dysphagia and pulm rather than fluid overloaded.   renal function quite worse in short time.   give lasix prn   steroids   pulm consult called.    cont norvasc 5 mg daily    ergocalciferol 32954 U weekly   onc work up.   d/w pulm/  d/w pt daughter as well

## 2019-04-15 NOTE — CONSULT NOTE ADULT - SUBJECTIVE AND OBJECTIVE BOX
PULMONARY CONSULT  Lai Payton MD  [phone]    Initial HPI on admission:  HPI:  84yoF with PMH of PVD s/p stent, breast cancer s/p lumpectomy and RTx, RA, CKD, PVD s/p RLE stent, DVT on coumadin, hypothyroidism who presents to the ED with complaint of shortness of breath. The patient states she was diagnosed with   "water in the lungs" in 2018, and had been monitored over time with no intervention. Over the last week, she developed worsening ORTIZ, cough productive of dark yellow sputum, and wheezing, prompting her to visit Dr. Reynoso. She was planned for thoracentesis for this week, and her home dose of coumadin was held. Because patient was no longer able to ambulate short distances from her kitchen to her bathroom due to dyspnea, patient was brought to ED by her family and friends. She denies any fever, chills, N/V, CP palpitations, orthopnea or PND.   Dr. Reynoso had started her on lasix 40mg PO qd, with which she has had increased urine output.  Pt has known CKD. She states her normal SCr is around 2, however on recent labs had increased to about 3.17.    In the ED, T 98F, HR 74, /80, RR 16, SpO2 98% RA   Pt was given lasix 40mg IVP x 1. (2019 01:42)      BRIEF HOSPITAL COURSE: ***    PAST MEDICAL & SURGICAL HISTORY:  CKD (chronic kidney disease)  Prediabetes  Breast cancer: hx lumpectomy, radiation  Hypothyroid  Anarthritic Rheumatoid Disease  Mee HTN  GERD (Gastroesophageal Reflux  S/P shoulder surgery: right  Embolus: Right leg arterial stent  H/O: Hysterectomy    Allergies    Allegra (Unknown)  amoxicillin (Hives)  Cipro (Hives)  shellfish (Rash)    Intolerances      FAMILY HISTORY:    Social History (marital status, living situation, occupation, tobacco use, alcohol and drug use, and sexual history): The patient lives with a home health aide. She ambulates with a walker or cane at home. She denies tobacco or alcohol use.	     Tobacco Screening:  · Core Measure Site	No	      Review of Systems: REVIEW OF SYSTEMS  CONSTITUTIONAL: No fever, no chills, + fatigue  EYES: No eye pain, no vision changes, + wears glasses  ENMT:  No difficulty hearing, no throat pain  RESPIRATORY: + cough, + wheezing, + sputum production; + shortness of breath  CARDIOVASCULAR: No chest pain, no palpitations, + ORTIZ, + mild leg swelling  GASTROINTESTINAL: No abdominal pain, no nausea, no vomiting, no diarrhea, no constipation, occasional episodes of rectal bleeding which she attributes to constipation  GENITOURINARY: No dysuria, no hematuria  NEUROLOGICAL: No headaches, no loss of strength, no numbness  SKIN: No itching, no rashes, no lesions   MUSCULOSKELETAL: No joint pain, no joint swelling; No muscle pain HEME/LYMPH: No easy bruising, bleeding	      Allergies and Intolerances:        Allergies:  	shellfish: Food, Rash  	Allegra: Drug, Unknown  	Cipro: Drug, Hives  	amoxicillin: Drug, Hives    Medications:  MEDICATIONS  (STANDING):  ALBUTerol/ipratropium for Nebulization 3 milliLiter(s) Nebulizer every 4 hours  amLODIPine   Tablet 5 milliGRAM(s) Oral daily  calcium carbonate 1250 mG  + Vitamin D (OsCal 500 + D) 1 Tablet(s) Oral daily  ergocalciferol 67655 Unit(s) Oral every week  heparin  Infusion.  Unit(s)/Hr (17 mL/Hr) IV Continuous <Continuous>  hydroxychloroquine 200 milliGRAM(s) Oral daily  levothyroxine 50 MICROGram(s) Oral daily  pantoprazole    Tablet 40 milliGRAM(s) Oral before breakfast    MEDICATIONS  (PRN):  heparin  Injectable 7500 Unit(s) IV Push every 6 hours PRN For aPTT less than 40  heparin  Injectable 3500 Unit(s) IV Push every 6 hours PRN For aPTT between 40 - 57  oxyCODONE    IR 2.5 milliGRAM(s) Oral four times a day PRN Severe Pain (7 - 10)  zolpidem 5 milliGRAM(s) Oral at bedtime PRN Insomnia    Vital Signs Last 24 Hrs  T(C): 36.6 (15 Apr 2019 04:24), Max: 36.9 (2019 20:52)  T(F): 97.8 (15 Apr 2019 04:24), Max: 98.4 (2019 20:52)  HR: 89 (15 Apr 2019 08:45) (84 - 93)  BP: 164/72 (15 Apr 2019 08:45) (143/65 - 166/75)  BP(mean): --  RR: 18 (15 Apr 2019 04:24) (17 - 18)  SpO2: 93% (15 Apr 2019 04:24) (93% - 100%)    14 @ 07:01  -  04-15 @ 07:00  --------------------------------------------------------  IN: 220 mL / OUT: 250 mL / NET: -30 mL    LABS:                        8.3    15. )-----------( 326      ( 15 Apr 2019 09:05 )             28.1     04-15    145  |  109<H>  |  47<H>  ----------------------------<  153<H>  3.8   |  19<L>  |  3.70<H>    Ca    9.6      15 Apr 2019 06:40  Phos  4.1         TPro  7.3  /  Alb  3.7  /  TBili  0.2  /  DBili  x   /  AST  17  /  ALT  15  /  AlkPhos  90  04-13      PT/INR - ( 15 Apr 2019 09:34 )   PT: 15.9 sec;   INR: 1.38 ratio         PTT - ( 2019 16:13 )  PTT:30.8 sec  Urinalysis Basic - ( 2019 09:47 )    Color: Light Yellow / Appearance: Slightly Turbid / S.009 / pH: x  Gluc: x / Ketone: Negative  / Bili: Negative / Urobili: <2 mg/dL   Blood: x / Protein: 100 mg/dL / Nitrite: Negative   Leuk Esterase: Negative / RBC: 1 /HPF / WBC 1 /HPF   Sq Epi: x / Non Sq Epi: 1 /HPF / Bacteria: TNTC    Serum Pro-Brain Natriuretic Peptide: 7846 pg/mL (19 @ 19:38)    Physical Examination:    General: No acute distress.      HEENT: Pupils equal, reactive to light.  Symmetric.    PULM: Clear to auscultation bilaterally, no significant sputum production    CVS: Regular rate and rhythm, no murmurs, rubs, or gallops    ABD: Soft, nondistended, nontender, normoactive bowel sounds, no masses    EXT: No edema, nontender    SKIN: Warm and well perfused, no rashes noted.    NEURO: Alert, oriented, interactive, nonfocal    RADIOLOGY REVIEWED PERSONALLY  CXR:    CT chest:    PROCEDURE DATE:  2019      INTERPRETATION:  CT CHEST WITHOUT CONTRAST    INDICATION: Shortness of breath, pleural effusion    TECHNIQUE: Unenhanced helical images were obtained of the chest. Coronal   and sagittal images were reconstructed. Maximum intensity projection   images were generated.     COMPARISON: No prior chest CT.    FINDINGS:     TUBES/LINES: None.    AIRWAYS, LUNGS, PLEURA: Patent central airways. No bronchial wall   thickening or bronchiectasis. Foci of bronchiolar impaction within the   basilar left lower lobe and lingula.    Small bilateral pleural effusions and subsegmental atelectasis of basilar   lower lobes. Partial atelectasis of the lingula.    1.5 and 0.9 cm groundglass nodules within the right upper lobe (series 2   images 27 and 35), possibly inflammatory.    0.9 cm subpleural solid nodule within the basilar right lower lobe   (series 2 image 68).    MEDIASTINUM, LYMPH NODES: No lymphadenopathy.    HEART AND VASCULATURE: The heart is mildly enlarged. The left atrium is   dilated. Small pericardial effusion. Severe mitral annulus calcification.   Moderate calcific coronary atherosclerosis. Thoracic aorta normal in   caliber.    UPPER ABDOMEN: Small hiatal hernia. Multiple bilateral renal cysts.   Aortic atherosclerosis. Fatty atrophy of the pancreas.    BONES AND SOFT TISSUES: 2.4 x 2 cm spiculated nodule within the posterior   depth of the left breast (series 2 image 54) suspicious for neoplasm.     Osteopenia. Old fracture deformity of the multiple left ribs. Scoliosis.   Severe degenerative disease of the lower thoracic/upper lumbar spine.    Fixation bar and screws within the right humerus.    LOWER NECK: Multiple thyroid nodules measuring up to 1.3 cm. Recommend   further evaluation with ultrasound.    IMPRESSION:     1.  Foci of bronchiolar impaction within the basilar left lower lobe and   lingula. Small bilateral pleural effusions and subsegmental atelectasis   of basilar lower lobes. Partial atelectasis of the lingula. Underlying   infection cannot be ruled out.     2.  0.9 cm subpleural solid nodule within the basilar right lower lobe   (series 2 image 68). Recommend follow-up chest CT in 3 months to   determine the stability/resolution.     3.  2.4 x 2 cm spiculated nodule within the posterior depth of the left   breast (series 2 image 54). Recommend further evaluation with diagnostic   mammogram and breast ultrasound.    TTE: Pending PULMONARY CONSULT  Lai Payton MD  [phone]    Initial HPI on admission:  HPI:  84yoF with PMH of PVD s/p stent, breast cancer s/p lumpectomy and RTx, RA, CKD, PVD s/p RLE stent, DVT on coumadin, hypothyroidism who presents to the ED with complaint of shortness of breath. The patient states she was diagnosed with   "water in the lungs" in 2018, and had been monitored over time with no intervention. Over the last week, she developed worsening ORTIZ, cough productive of dark yellow sputum, and wheezing, prompting her to visit Dr. Reynoso. She was planned for thoracentesis for this week, and her home dose of coumadin was held. Because patient was no longer able to ambulate short distances from her kitchen to her bathroom due to dyspnea, patient was brought to ED by her family and friends. She denies any fever, chills, N/V, CP palpitations, orthopnea or PND.   Dr. Reynoso had started her on lasix 40mg PO qd, with which she has had increased urine output.  Pt has known CKD. She states her normal SCr is around 2, however on recent labs had increased to about 3.17.    In the ED, T 98F, HR 74, /80, RR 16, SpO2 98% RA   Pt was given lasix 40mg IVP x 1. (2019 01:42)    Patient has c/o 1-2 weeks productive cough, "wheezing" with ORTIZ. She denies orthopnea, PND. SHe has remote smoking history yet denies overt history of COPD/Asthma. She c/o coughing with po intake, and c/o change invoice: "slurring" of speech. She admits to minimal pedal edema. She has history of bilateral breast CA dx and treated in  with R lumpectomy and ? L resection: states she was told that LN were negative. SHe was treated with postop RT and is on maintainence oral chemotherapy. She is followed by local oncologist ( [facility]). She denies history of VTE or cardiac disease. She has been on coumadin chronically, approximately 10 years, after arterial thrombus and STENT.     PAST MEDICAL & SURGICAL HISTORY:  CKD (chronic kidney disease)  Prediabetes  Breast cancer: hx lumpectomy, radiation  Hypothyroid  Anarthritic Rheumatoid Disease  Mee HTN  GERD (Gastroesophageal Reflux  S/P shoulder surgery: right  Embolus: Right leg arterial stent  H/O: Hysterectomy    Allergies    Allegra (Unknown)  amoxicillin (Hives)  Cipro (Hives)  shellfish (Rash)    Intolerances      FAMILY HISTORY:    Social History (marital status, living situation, occupation, tobacco use, alcohol and drug use, and sexual history): The patient lives with a home health aide. She ambulates with a walker or cane at home. She denies tobacco or alcohol use.	     Tobacco Screening:  · Core Measure Site	No	      Review of Systems: REVIEW OF SYSTEMS  CONSTITUTIONAL: No fever, no chills, + fatigue  EYES: No eye pain, no vision changes, + wears glasses  ENMT:  No difficulty hearing, no throat pain  RESPIRATORY: + cough, + wheezing, + sputum production; + shortness of breath  CARDIOVASCULAR: No chest pain, no palpitations, + ORTIZ, + mild leg swelling  GASTROINTESTINAL: No abdominal pain, no nausea, no vomiting, no diarrhea, no constipation, occasional episodes of rectal bleeding which she attributes to constipation  GENITOURINARY: No dysuria, no hematuria  NEUROLOGICAL: No headaches, no loss of strength, no numbness  SKIN: No itching, no rashes, no lesions   MUSCULOSKELETAL: No joint pain, no joint swelling; No muscle pain HEME/LYMPH: No easy bruising, bleeding	      Allergies and Intolerances:        Allergies:  	shellfish: Food, Rash  	Allegra: Drug, Unknown  	Cipro: Drug, Hives  	amoxicillin: Drug, Hives    Medications:  MEDICATIONS  (STANDING):  ALBUTerol/ipratropium for Nebulization 3 milliLiter(s) Nebulizer every 4 hours  amLODIPine   Tablet 5 milliGRAM(s) Oral daily  calcium carbonate 1250 mG  + Vitamin D (OsCal 500 + D) 1 Tablet(s) Oral daily  ergocalciferol 39087 Unit(s) Oral every week  heparin  Infusion.  Unit(s)/Hr (17 mL/Hr) IV Continuous <Continuous>  hydroxychloroquine 200 milliGRAM(s) Oral daily  levothyroxine 50 MICROGram(s) Oral daily  pantoprazole    Tablet 40 milliGRAM(s) Oral before breakfast    MEDICATIONS  (PRN):  heparin  Injectable 7500 Unit(s) IV Push every 6 hours PRN For aPTT less than 40  heparin  Injectable 3500 Unit(s) IV Push every 6 hours PRN For aPTT between 40 - 57  oxyCODONE    IR 2.5 milliGRAM(s) Oral four times a day PRN Severe Pain (7 - 10)  zolpidem 5 milliGRAM(s) Oral at bedtime PRN Insomnia    Vital Signs Last 24 Hrs  T(C): 36.6 (15 Apr 2019 04:24), Max: 36.9 (2019 20:52)  T(F): 97.8 (15 Apr 2019 04:24), Max: 98.4 (2019 20:52)  HR: 89 (15 Apr 2019 08:45) (84 - 93)  BP: 164/72 (15 Apr 2019 08:45) (143/65 - 166/75)  BP(mean): --  RR: 18 (15 Apr 2019 04:24) (17 - 18)  SpO2: 93% (15 Apr 2019 04:24) (93% - 100%)    14 @ 07:01  -  -15 @ 07:00  --------------------------------------------------------  IN: 220 mL / OUT: 250 mL / NET: -30 mL    LABS:                        8.3    15. )-----------( 326      ( 15 Apr 2019 09:05 )             28.1     04-15    145  |  109<H>  |  47<H>  ----------------------------<  153<H>  3.8   |  19<L>  |  3.70<H>    Ca    9.6      15 Apr 2019 06:40  Phos  4.1     -14    TPro  7.3  /  Alb  3.7  /  TBili  0.2  /  DBili  x   /  AST  17  /  ALT  15  /  AlkPhos  90  04-13      PT/INR - ( 15 Apr 2019 09:34 )   PT: 15.9 sec;   INR: 1.38 ratio         PTT - ( 2019 16:13 )  PTT:30.8 sec  Urinalysis Basic - ( 2019 09:47 )    Color: Light Yellow / Appearance: Slightly Turbid / S.009 / pH: x  Gluc: x / Ketone: Negative  / Bili: Negative / Urobili: <2 mg/dL   Blood: x / Protein: 100 mg/dL / Nitrite: Negative   Leuk Esterase: Negative / RBC: 1 /HPF / WBC 1 /HPF   Sq Epi: x / Non Sq Epi: 1 /HPF / Bacteria: TNTC    Serum Pro-Brain Natriuretic Peptide: 7846 pg/mL (19 @ 19:38)    Physical Examination:    General: No acute distress.      HEENT: Pupils equal, reactive to light.  Symmetric.    PULM: Clear to auscultation bilaterally, no significant sputum production    CVS: Regular rate and rhythm, no murmurs, rubs, or gallops    ABD: Soft, nondistended, nontender, normoactive bowel sounds, no masses    EXT: No edema, nontender    SKIN: Warm and well perfused, no rashes noted.    NEURO: Alert, oriented, interactive, nonfocal    RADIOLOGY REVIEWED PERSONALLY  CXR:    CT chest:    PROCEDURE DATE:  2019      INTERPRETATION:  CT CHEST WITHOUT CONTRAST    INDICATION: Shortness of breath, pleural effusion    TECHNIQUE: Unenhanced helical images were obtained of the chest. Coronal   and sagittal images were reconstructed. Maximum intensity projection   images were generated.     COMPARISON: No prior chest CT.    FINDINGS:     TUBES/LINES: None.    AIRWAYS, LUNGS, PLEURA: Patent central airways. No bronchial wall   thickening or bronchiectasis. Foci of bronchiolar impaction within the   basilar left lower lobe and lingula.    Small bilateral pleural effusions and subsegmental atelectasis of basilar   lower lobes. Partial atelectasis of the lingula.    1.5 and 0.9 cm groundglass nodules within the right upper lobe (series 2   images 27 and 35), possibly inflammatory.    0.9 cm subpleural solid nodule within the basilar right lower lobe   (series 2 image 68).    MEDIASTINUM, LYMPH NODES: No lymphadenopathy.    HEART AND VASCULATURE: The heart is mildly enlarged. The left atrium is   dilated. Small pericardial effusion. Severe mitral annulus calcification.   Moderate calcific coronary atherosclerosis. Thoracic aorta normal in   caliber.    UPPER ABDOMEN: Small hiatal hernia. Multiple bilateral renal cysts.   Aortic atherosclerosis. Fatty atrophy of the pancreas.    BONES AND SOFT TISSUES: 2.4 x 2 cm spiculated nodule within the posterior   depth of the left breast (series 2 image 54) suspicious for neoplasm.     Osteopenia. Old fracture deformity of the multiple left ribs. Scoliosis.   Severe degenerative disease of the lower thoracic/upper lumbar spine.    Fixation bar and screws within the right humerus.    LOWER NECK: Multiple thyroid nodules measuring up to 1.3 cm. Recommend   further evaluation with ultrasound.    IMPRESSION:     1.  Foci of bronchiolar impaction within the basilar left lower lobe and   lingula. Small bilateral pleural effusions and subsegmental atelectasis   of basilar lower lobes. Partial atelectasis of the lingula. Underlying   infection cannot be ruled out.     2.  0.9 cm subpleural solid nodule within the basilar right lower lobe   (series 2 image 68). Recommend follow-up chest CT in 3 months to   determine the stability/resolution.     3.  2.4 x 2 cm spiculated nodule within the posterior depth of the left   breast (series 2 image 54). Recommend further evaluation with diagnostic   mammogram and breast ultrasound.    TTE: Pending PULMONARY CONSULT  Lai Payton MD  [phone]    Initial HPI on admission:  HPI:  84yoF with PMH of PVD s/p stent, breast cancer s/p lumpectomy and RTx, RA, CKD, PVD s/p RLE stent, DVT on coumadin, hypothyroidism who presents to the ED with complaint of shortness of breath. The patient states she was diagnosed with   "water in the lungs" in 2018, and had been monitored over time with no intervention. Over the last week, she developed worsening ORTIZ, cough productive of dark yellow sputum, and wheezing, prompting her to visit Dr. Reynoso. She was planned for thoracentesis for this week, and her home dose of coumadin was held. Because patient was no longer able to ambulate short distances from her kitchen to her bathroom due to dyspnea, patient was brought to ED by her family and friends. She denies any fever, chills, N/V, CP palpitations, orthopnea or PND.   Dr. Reynoso had started her on lasix 40mg PO qd, with which she has had increased urine output.  Pt has known CKD. She states her normal SCr is around 2, however on recent labs had increased to about 3.17.    In the ED, T 98F, HR 74, /80, RR 16, SpO2 98% RA   Pt was given lasix 40mg IVP x 1. (2019 01:42)    Patient has c/o 1-2 weeks productive cough, "wheezing" with ORTIZ. She denies orthopnea, PND. SHe has remote smoking history yet denies overt history of COPD/Asthma. She c/o coughing with po intake, and c/o change invoice: "slurring" of speech. She admits to minimal pedal edema. She has history of bilateral breast CA dx and treated in  with R lumpectomy and ? L resection: states she was told that LN were negative. SHe was treated with postop RT and is on maintainence oral chemotherapy. She is followed by local oncologist ( [facility]). She denies history of VTE or cardiac disease. She has been on coumadin chronically, approximately 10 years, after arterial thrombus and STENT. SHe denies fever, chills, flu-like symptoms.    PAST MEDICAL & SURGICAL HISTORY:  CKD (chronic kidney disease)  Prediabetes  Breast cancer: hx lumpectomy, radiation  Hypothyroid  Anarthritic Rheumatoid Disease  Mee HTN  GERD (Gastroesophageal Reflux  S/P shoulder surgery: right  Embolus: Right leg arterial stent  H/O: Hysterectomy    Allergies    Allegra (Unknown)  amoxicillin (Hives)  Cipro (Hives)  shellfish (Rash)    FAMILY HISTORY:    Social History (marital status, living situation, occupation, tobacco use, alcohol and drug use, and sexual history): The patient lives with a home health aide. She ambulates with a walker or cane at home. She denies tobacco or alcohol use.	     Tobacco Screening:  · Core Measure Site	No	      Review of Systems: REVIEW OF SYSTEMS  CONSTITUTIONAL: No fever, no chills, + fatigue  EYES: No eye pain, no vision changes, + wears glasses  ENMT:  No difficulty hearing, no throat pain  RESPIRATORY: + cough, + wheezing, + sputum production; + shortness of breath  CARDIOVASCULAR: No chest pain, no palpitations, + ORTIZ, + mild leg swelling  GASTROINTESTINAL: No abdominal pain, no nausea, no vomiting, no diarrhea, no constipation, occasional episodes of rectal bleeding which she attributes to constipation  GENITOURINARY: No dysuria, no hematuria  NEUROLOGICAL: No headaches, no loss of strength, no numbness  SKIN: No itching, no rashes, no lesions   MUSCULOSKELETAL: No joint pain, no joint swelling; No muscle pain HEME/LYMPH: No easy bruising, bleeding	      Allergies and Intolerances:        Allergies:  	shellfish: Food, Rash  	Allegra: Drug, Unknown  	Cipro: Drug, Hives  	amoxicillin: Drug, Hives    Medications:  MEDICATIONS  (STANDING):  ALBUTerol/ipratropium for Nebulization 3 milliLiter(s) Nebulizer every 4 hours  amLODIPine   Tablet 5 milliGRAM(s) Oral daily  calcium carbonate 1250 mG  + Vitamin D (OsCal 500 + D) 1 Tablet(s) Oral daily  ergocalciferol 74273 Unit(s) Oral every week  heparin  Infusion.  Unit(s)/Hr (17 mL/Hr) IV Continuous <Continuous>  hydroxychloroquine 200 milliGRAM(s) Oral daily  levothyroxine 50 MICROGram(s) Oral daily  pantoprazole    Tablet 40 milliGRAM(s) Oral before breakfast    MEDICATIONS  (PRN):  heparin  Injectable 7500 Unit(s) IV Push every 6 hours PRN For aPTT less than 40  heparin  Injectable 3500 Unit(s) IV Push every 6 hours PRN For aPTT between 40 - 57  oxyCODONE    IR 2.5 milliGRAM(s) Oral four times a day PRN Severe Pain (7 - 10)  zolpidem 5 milliGRAM(s) Oral at bedtime PRN Insomnia    Vital Signs Last 24 Hrs  T(C): 36.6 (15 Apr 2019 04:24), Max: 36.9 (2019 20:52)  T(F): 97.8 (15 Apr 2019 04:24), Max: 98.4 (2019 20:52)  HR: 89 (15 Apr 2019 08:45) (84 - 93)  BP: 164/72 (15 Apr 2019 08:45) (143/65 - 166/75)  BP(mean): --  RR: 18 (15 Apr 2019 04:24) (17 - 18)  SpO2: 93% (15 Apr 2019 04:24) (93% - 100%)    -14 @ 07:01  -  04-15 @ 07:00  --------------------------------------------------------  IN: 220 mL / OUT: 250 mL / NET: -30 mL    LABS:                        8.3    15. )-----------( 326      ( 15 Apr 2019 09:05 )             28.1     04-15    145  |  109<H>  |  47<H>  ----------------------------<  153<H>  3.8   |  19<L>  |  3.70<H>    Ca    9.6      15 Apr 2019 06:40  Phos  4.1     04-14    TPro  7.3  /  Alb  3.7  /  TBili  0.2  /  DBili  x   /  AST  17  /  ALT  15  /  AlkPhos  90  04-13      PT/INR - ( 15 Apr 2019 09:34 )   PT: 15.9 sec;   INR: 1.38 ratio         PTT - ( 2019 16:13 )  PTT:30.8 sec  Urinalysis Basic - ( 2019 09:47 )    Color: Light Yellow / Appearance: Slightly Turbid / S.009 / pH: x  Gluc: x / Ketone: Negative  / Bili: Negative / Urobili: <2 mg/dL   Blood: x / Protein: 100 mg/dL / Nitrite: Negative   Leuk Esterase: Negative / RBC: 1 /HPF / WBC 1 /HPF   Sq Epi: x / Non Sq Epi: 1 /HPF / Bacteria: TNTC    Serum Pro-Brain Natriuretic Peptide: 7846 pg/mL (19 @ 19:38)    Physical Examination:    General: Non toxic, No acute distress. O2 sat 95% on RA     HEENT: Pupils equal, reactive to light.  Trace L eyelid droop. Speech slurred    PULM: BIlateral coarse exp rhonchi/wheeze with crackles L>R basilar    CVS: Regular rate and rhythm, no murmurs, rubs, or gallops    ABD: Soft, nondistended, nontender, normoactive bowel sounds, no masses    EXT: No edema, nontender    SKIN: Warm and well perfused, no rashes noted.    NEURO: Alert, oriented, interactive, nonfocal    RADIOLOGY REVIEWED PERSONALLY  CXR:    PROCEDURE DATE:  2019        INTERPRETATION:  CLINICAL INFORMATION: 84F with CHF, dvt, pleural   effusions. Shortness of breath.    EXAM: Frontal radiograph of the chest.    COMPARISON: None.    FINDINGS:    Lines/Tubes: None.    Lungs: Mild pulmonary edema. Left lower lung opacity.    Pleura: Left pleural effusion. No pneumothorax.    Mediastinum: Cardiac silhouette cannot be assessed.    Skeletal: No acute osseous findings. Orthopedic hardware along the right   humerus.      IMPRESSION:  1.  Mild pulmonary edema.   2.  Left lower lung opacity, which may represent left pleural effusion   and left lower lobe pneumonia and/or atelectasis.      CT chest:    PROCEDURE DATE:  2019      INTERPRETATION:  CT CHEST WITHOUT CONTRAST    INDICATION: Shortness of breath, pleural effusion    TECHNIQUE: Unenhanced helical images were obtained of the chest. Coronal   and sagittal images were reconstructed. Maximum intensity projection   images were generated.     COMPARISON: No prior chest CT.    FINDINGS:     TUBES/LINES: None.    AIRWAYS, LUNGS, PLEURA: Patent central airways. No bronchial wall   thickening or bronchiectasis. Foci of bronchiolar impaction within the   basilar left lower lobe and lingula.    Small bilateral pleural effusions and subsegmental atelectasis of basilar   lower lobes. Partial atelectasis of the lingula.    1.5 and 0.9 cm groundglass nodules within the right upper lobe (series 2   images 27 and 35), possibly inflammatory.    0.9 cm subpleural solid nodule within the basilar right lower lobe   (series 2 image 68).    MEDIASTINUM, LYMPH NODES: No lymphadenopathy.    HEART AND VASCULATURE: The heart is mildly enlarged. The left atrium is   dilated. Small pericardial effusion. Severe mitral annulus calcification.   Moderate calcific coronary atherosclerosis. Thoracic aorta normal in   caliber.    UPPER ABDOMEN: Small hiatal hernia. Multiple bilateral renal cysts.   Aortic atherosclerosis. Fatty atrophy of the pancreas.    BONES AND SOFT TISSUES: 2.4 x 2 cm spiculated nodule within the posterior   depth of the left breast (series 2 image 54) suspicious for neoplasm.     Osteopenia. Old fracture deformity of the multiple left ribs. Scoliosis.   Severe degenerative disease of the lower thoracic/upper lumbar spine.    Fixation bar and screws within the right humerus.    LOWER NECK: Multiple thyroid nodules measuring up to 1.3 cm. Recommend   further evaluation with ultrasound.    IMPRESSION:     1.  Foci of bronchiolar impaction within the basilar left lower lobe and   lingula. Small bilateral pleural effusions and subsegmental atelectasis   of basilar lower lobes. Partial atelectasis of the lingula. Underlying   infection cannot be ruled out.     2.  0.9 cm subpleural solid nodule within the basilar right lower lobe   (series 2 image 68). Recommend follow-up chest CT in 3 months to   determine the stability/resolution.     3.  2.4 x 2 cm spiculated nodule within the posterior depth of the left   breast (series 2 image 54). Recommend further evaluation with diagnostic   mammogram and breast ultrasound.    TTE: Pending

## 2019-04-15 NOTE — CONSULT NOTE ADULT - PROBLEM SELECTOR PROBLEM 4
R/O Malignant neoplasm of female breast, unspecified estrogen receptor status, unspecified laterality, unspecified site of breast

## 2019-04-15 NOTE — CHART NOTE - NSCHARTNOTEFT_GEN_A_CORE
Case discussed with Dr. Maher, cardiology, who recommended heparin gtt while holding coumadin for pending thoracentesis. INR 1.38 today, heparin gtt until INR >2

## 2019-04-15 NOTE — CHART NOTE - NSCHARTNOTEFT_GEN_A_CORE
Case discussed with Dr. Heck. No procedures planned at this time. Pt remains in [facility]. Resume couamdin 3mg tonight as per

## 2019-04-15 NOTE — CONSULT NOTE ADULT - ASSESSMENT
84 f with h/o bilateral T1cN0 ER+ breast cancer in 2009, s/p lumpectomy and radiation, on letrozole from ([id number]).  DVT, ckd , hypothyroid,  Rheumatoid arthritis  admitted with dyspnea and cough.  maria g on ckd  and possible recurrent breast ca

## 2019-04-15 NOTE — CONSULT NOTE ADULT - PROBLEM SELECTOR RECOMMENDATION 9
-Heart failure vs pleural effusion of other etiology vs ?progression of ckd  -elevated bnp  -effusions on ct chest  -Ok with lasix 40 mg iv bid. start lasix 40 iv today.  -Monitor i/os  -daily weights  -TTE

## 2019-04-15 NOTE — CONSULT NOTE ADULT - ASSESSMENT
84F with history of breast CA, s/p lumpectomy and RT, presents with wheezing, productive cough, pedal edema. CXR on 4/13 is limited, rotated AP with perihilar venous congestion and non specific L opacity. CT chest now, post diuresis, shows very small bilateral effusions, RUL GG nodules, dispropirtionate LLL and lingular atelectasis. Her wheezing is of pulmonary origin and is coarse and c/w bronchitis - either due to micro aspiration/dysphagia, or infection (despite neg PCR in ER). Unclear if speech difficulty is central or peripheral - patient has clear symptoms of dysphagia. SHe is afebrile and non toxic, though has leukocytosis. Wheezing now appears to be pulmonary in origin. Patient now appears euvolemic    REC    ENT evaluation  Swallow evaluation  Begin duoneb, pulmicort: hold off on systemic steroids  Ertapenum 500mg q 24  Repeat viral PCR/procalcitonin  TTE  Head CT  No plans for thorocentesis at this time - effusion too small  Consider holding diuretics given euvolemia and rising creatinine 84F with history of breast CA, s/p lumpectomy and RT, presents with wheezing, productive cough, pedal edema. CXR on 4/13 is limited, rotated AP with perihilar venous congestion and non specific L opacity. CT chest now, post diuresis, shows very small bilateral effusions, RUL GG nodules, dispropirtionate LLL and lingular atelectasis. Her wheezing is of pulmonary origin and is coarse and c/w bronchitis - either due to micro aspiration/dysphagia, or infection (despite neg PCR in ER). Unclear if speech difficulty is central or peripheral - patient has clear symptoms of dysphagia. SHe is afebrile and non toxic, though has leukocytosis. Wheezing now appears to be pulmonary in origin. Patient now appears euvolemic    REC    ENT evaluation  Swallow evaluation  Begin duoneb, pulmicort:   Prednisone 20 mg po bid  Ertapenum 500mg q 24  Repeat viral PCR/procalcitonin  TTE  Head CT  No plans for thorocentesis at this time - effusion too small  Consider holding diuretics given euvolemia and rising creatinine

## 2019-04-15 NOTE — CONSULT NOTE ADULT - ASSESSMENT
85 y/o female patient with PMHx of PVD s/p RLE stent, breast cancer s/p lumpectomy and RTx, RA, CKD, DVT on coumadin, hypothyroidism presents to the hospital c/o shortness of breath. Patient currently c/o cough and hoarseness. Diagnostic endoscopy performed, cricoid shelf present. CT chest reviewed, no evidence of subglottic stenosis. 85 y/o female patient with PMHx of PVD s/p RLE stent, breast cancer s/p lumpectomy and RTx, RA, CKD, DVT on coumadin, hypothyroidism presents to the hospital c/o shortness of breath, CHF excerbation. Patient currently c/o cough and hoarseness. Laryngoscopy performed, cricoid shelf present without evidence of subglottic stenosis, otherwise normal FOE.

## 2019-04-15 NOTE — CONSULT NOTE ADULT - REASON FOR ADMISSION
shortness of breath
shortness of breath/CHF
shortness of breath
shortness of breath

## 2019-04-15 NOTE — CONSULT NOTE ADULT - PROBLEM SELECTOR RECOMMENDATION 9
- No surgical intervention at this time  - Diagnostic endoscopy wnl  - Continue care as per medicine - No surgical intervention at this time, symptoms likely due to pulmonary sources, will differ to primary team  -Reconsult ENT as needed - No surgical intervention at this time, symptoms likely due to pulmonary sources, will defer to primary team for further management   -Reconsult ENT as needed

## 2019-04-15 NOTE — CONSULT NOTE ADULT - ASSESSMENT
84yoF with PMH of PVD s/p stent, breast cancer s/p lumpectomy and RTx, RA, CKD, PVD s/p RLE stent, DVT on coumadin, hypothyroidism who presents for shortness of breath.

## 2019-04-15 NOTE — CONSULT NOTE ADULT - PROBLEM SELECTOR RECOMMENDATION 9
and cough  some ground glass opacities right upper lobe  ct noted b/l effusions, atelectasis ? underlying infection  s/s evaluation  strict aspiration precautions  d/w Dr Payton and concern for aspiration will do ertapenem dose adusted for creat clearance 16

## 2019-04-15 NOTE — CONSULT NOTE ADULT - SUBJECTIVE AND OBJECTIVE BOX
CC: cough, hoarseness    HPI: 85 y/o female patient with PMHx of PVD s/p RLE stent, breast cancer s/p lumpectomy and RTx, RA, CKD, DVT on coumadin, hypothyroidism presents to the hospital c/o shortness of breat, likely CHF execer. Patient was seen and evaluated by ED. Patient also seen by pulmonology, who had concerns for possible polyps. ENT consulted to evaluate patient's hoarseness and cough. Patient currently c/o cough and new onset hoarseness. Denies odynophagia, dysphagia, or any other complaints.      PAST MEDICAL & SURGICAL HISTORY:  CKD (chronic kidney disease)  Prediabetes  Breast cancer: hx lumpectomy, radiation  Hypothyroid  Anarthritic Rheumatoid Disease  Mee HTN  GERD (Gastroesophageal Reflux  S/P shoulder surgery: right  Embolus: Right leg arterial stent  H/O: Hysterectomy    Allergies  Allegra (Unknown)  amoxicillin (Hives)  Cipro (Hives)  shellfish (Rash)    MEDICATIONS  (STANDING):  ALBUTerol/ipratropium for Nebulization 3 milliLiter(s) Nebulizer every 4 hours  amLODIPine   Tablet 5 milliGRAM(s) Oral daily  buDESOnide   0.5 milliGRAM(s) Respule 0.5 milliGRAM(s) Inhalation two times a day  calcium carbonate 1250 mG  + Vitamin D (OsCal 500 + D) 1 Tablet(s) Oral daily  ergocalciferol 58469 Unit(s) Oral every week  ertapenem  IVPB 500 milliGRAM(s) IV Intermittent every 24 hours  heparin  Infusion.  Unit(s)/Hr (17 mL/Hr) IV Continuous <Continuous>  hydroxychloroquine 200 milliGRAM(s) Oral daily  levothyroxine 50 MICROGram(s) Oral daily  pantoprazole    Tablet 40 milliGRAM(s) Oral before breakfast  predniSONE   Tablet 1 milliGRAM(s) Oral two times a day    MEDICATIONS  (PRN):  heparin  Injectable 7500 Unit(s) IV Push every 6 hours PRN For aPTT less than 40  heparin  Injectable 3500 Unit(s) IV Push every 6 hours PRN For aPTT between 40 - 57  oxyCODONE    IR 2.5 milliGRAM(s) Oral four times a day PRN Severe Pain (7 - 10)  zolpidem 5 milliGRAM(s) Oral at bedtime PRN Insomnia      Social History:   Former "non-heavy" cigarette smoker (Quit 10 years ago, unable to quantify)  The patient lives with a home health aide. She ambulates with a walker or cane at home    Family history:   No significant FHx    ROS:   ENT: all negative except as noted in HPI   CV: denies palpitations  Pulm: +SOB, + cough, denies hemoptysis  GI: denies change in appetite, indigestion, n/v  : denies pertinent urinary symptoms, urgency  Neuro: denies numbness/tingling, loss of sensation  Psych: denies anxiety  MS: denies muscle weakness, instability  Heme: denies easy bruising or bleeding  Endo: denies heat/cold intolerance, excessive sweating  Vascular: denies LE edema    Vital Signs Last 24 Hrs  T(C): 36.7 (15 Apr 2019 13:06), Max: 36.9 (14 Apr 2019 20:52)  T(F): 98 (15 Apr 2019 13:06), Max: 98.4 (14 Apr 2019 20:52)  HR: 89 (15 Apr 2019 13:06) (84 - 93)  BP: 155/65 (15 Apr 2019 13:06) (143/65 - 166/75)  RR: 18 (15 Apr 2019 13:06) (17 - 18)  SpO2: 95% (15 Apr 2019 13:06) (93% - 100%)                          8.3    15.18 )-----------( 326      ( 15 Apr 2019 09:05 )             28.1    04-15    145  |  109<H>  |  47<H>  ----------------------------<  153<H>  3.8   |  19<L>  |  3.70<H>    Ca    9.6      15 Apr 2019 06:40  Phos  4.1     04-14    TPro  7.3  /  Alb  3.7  /  TBili  0.2  /  DBili  x   /  AST  17  /  ALT  15  /  AlkPhos  90  04-13   PT/INR - ( 15 Apr 2019 09:34 )   PT: 15.9 sec;   INR: 1.38 ratio         PTT - ( 14 Apr 2019 16:13 )  PTT:30.8 sec    PHYSICAL EXAM:  Gen: NAD  Skin: No rashes, bruises, or lesions  Head: Normocephalic, Atraumatic  Face: no edema, erythema, or fluctuance. Parotid glands soft without mass  Eyes: no scleral injection  Nose: Nares bilaterally patent, no discharge  Mouth: No Stridor / Drooling / Trismus.  Mucosa moist, tongue/uvula midline, oropharynx clear  Neck: Flat, supple, no lymphadenopathy, trachea midline, no masses  Lymphatic: No lymphadenopathy  Resp: breathing easily, no stridor  CV: no peripheral edema/cyanosis  GI: nondistended   Peripheral vascular: no JVD or edema  Neuro: facial nerve intact, no facial droop    Diagnostic Nasal Endoscopy: (Scope #2 used)    IMAGING/ADDITIONAL STUDIES:   CT chest without contrast    IMPRESSION:     1.  Foci of bronchiolar impaction within the basilar left lower lobe and   lingula. Small bilateral pleural effusions and subsegmental atelectasis   of basilar lower lobes. Partial atelectasis of the lingula. Underlying   infection cannot be ruled out.     2.  0.9 cm subpleural solid nodule within the basilar right lower lobe   (series 2 image 68). Recommend follow-up chest CT in 3 months to   determine the stability/resolution.     3.  2.4 x 2 cm spiculated nodule within the posterior depth of the left   breast (series 2 image 54). Recommend further evaluation with diagnostic   mammogram and breast ultrasound. CC: cough, hoarseness    HPI: 85 y/o female patient with PMHx of PVD s/p RLE stent, breast cancer s/p lumpectomy and RTx, RA, CKD, DVT on coumadin, hypothyroidism presents to the hospital c/o shortness of breat, likely CHF exacerbation. Patient also seen by pulmonology, who had concerns for possible polyps. ENT consulted to evaluate patient's hoarseness and cough. Patient currently c/o cough and new onset hoarseness. Denies odynophagia, dysphagia, or any other complaints.    PAST MEDICAL & SURGICAL HISTORY:  CKD (chronic kidney disease)  Prediabetes  Breast cancer: hx lumpectomy, radiation  Hypothyroid  Anarthritic Rheumatoid Disease  Mee HTN  GERD (Gastroesophageal Reflux  S/P shoulder surgery: right  Embolus: Right leg arterial stent  H/O: Hysterectomy    Allergies  Allegra (Unknown)  amoxicillin (Hives)  Cipro (Hives)  shellfish (Rash)    MEDICATIONS  (STANDING):  ALBUTerol/ipratropium for Nebulization 3 milliLiter(s) Nebulizer every 4 hours  amLODIPine   Tablet 5 milliGRAM(s) Oral daily  buDESOnide   0.5 milliGRAM(s) Respule 0.5 milliGRAM(s) Inhalation two times a day  calcium carbonate 1250 mG  + Vitamin D (OsCal 500 + D) 1 Tablet(s) Oral daily  ergocalciferol 30051 Unit(s) Oral every week  ertapenem  IVPB 500 milliGRAM(s) IV Intermittent every 24 hours  heparin  Infusion.  Unit(s)/Hr (17 mL/Hr) IV Continuous <Continuous>  hydroxychloroquine 200 milliGRAM(s) Oral daily  levothyroxine 50 MICROGram(s) Oral daily  pantoprazole    Tablet 40 milliGRAM(s) Oral before breakfast  predniSONE   Tablet 1 milliGRAM(s) Oral two times a day    MEDICATIONS  (PRN):  heparin  Injectable 7500 Unit(s) IV Push every 6 hours PRN For aPTT less than 40  heparin  Injectable 3500 Unit(s) IV Push every 6 hours PRN For aPTT between 40 - 57  oxyCODONE    IR 2.5 milliGRAM(s) Oral four times a day PRN Severe Pain (7 - 10)  zolpidem 5 milliGRAM(s) Oral at bedtime PRN Insomnia    Social History:   Former "non-heavy" cigarette smoker (Quit 10 years ago, unable to quantify)  The patient lives with a home health aide. She ambulates with a walker or cane at home    Family history:   No significant FHx    ROS:   ENT: all negative except as noted in HPI   CV: denies palpitations  Pulm: +SOB, + cough, denies hemoptysis  GI: denies change in appetite, indigestion, n/v  : denies pertinent urinary symptoms, urgency  Neuro: denies numbness/tingling, loss of sensation  Psych: denies anxiety  MS: denies muscle weakness, instability  Heme: denies easy bruising or bleeding  Endo: denies heat/cold intolerance, excessive sweating  Vascular: denies LE edema    Vital Signs Last 24 Hrs  T(C): 36.7 (15 Apr 2019 13:06), Max: 36.9 (14 Apr 2019 20:52)  T(F): 98 (15 Apr 2019 13:06), Max: 98.4 (14 Apr 2019 20:52)  HR: 89 (15 Apr 2019 13:06) (84 - 93)  BP: 155/65 (15 Apr 2019 13:06) (143/65 - 166/75)  RR: 18 (15 Apr 2019 13:06) (17 - 18)  SpO2: 95% (15 Apr 2019 13:06) (93% - 100%)                          8.3    15.18 )-----------( 326      ( 15 Apr 2019 09:05 )             28.1    04-15    145  |  109<H>  |  47<H>  ----------------------------<  153<H>  3.8   |  19<L>  |  3.70<H>    Ca    9.6      15 Apr 2019 06:40  Phos  4.1     04-14    TPro  7.3  /  Alb  3.7  /  TBili  0.2  /  DBili  x   /  AST  17  /  ALT  15  /  AlkPhos  90  04-13   PT/INR - ( 15 Apr 2019 09:34 )   PT: 15.9 sec;   INR: 1.38 ratio         PTT - ( 14 Apr 2019 16:13 )  PTT:30.8 sec    PHYSICAL EXAM:  Gen: NAD  Skin: No rashes, bruises, or lesions  Head: Normocephalic, Atraumatic  Face: no edema, erythema, or fluctuance. Parotid glands soft without mass  Eyes: no scleral injection  Nose: Nares bilaterally patent, no discharge  Mouth: No Stridor / Drooling / Trismus.  Mucosa moist, tongue/uvula midline, oropharynx clear  Neck: Flat, supple, no lymphadenopathy, trachea midline, no masses  Lymphatic: No lymphadenopathy  Resp: expiratory wheezing, no stridor  CV: no peripheral edema/cyanosis  GI: nondistended   Peripheral vascular: no JVD or edema  Neuro: facial nerve intact, no facial droop    Reason for Laryngoscopy: hoarseness/cough  Patient was unable to cooperate with mirror.  Nasopharynx, oropharynx, and hypopharynx clear, no bleeding. Tongue base, posterior pharyngeal wall, vallecula, epiglottis, and subglottis appear normal. No erythema, edema, pooling of secretions, masses or lesions. Airway patent, no foreign body visualized. No glottic/supraglottic edema. Cricoid shelf noted, CT chest reviewed no evidence of subglottic stenosis. True vocal cords, arytenoids, vestibular folds, ventricles, pyriform sinuses, and aryepiglottic folds appear normal bilaterally. Vocal cords mobile with good contact b/l. Minimal reflux noted.     IMAGING/ADDITIONAL STUDIES:   CT chest without contrast 4/14/19  IMPRESSION:     1.  Foci of bronchiolar impaction within the basilar left lower lobe and   lingula. Small bilateral pleural effusions and subsegmental atelectasis   of basilar lower lobes. Partial atelectasis of the lingula. Underlying   infection cannot be ruled out.     2.  0.9 cm subpleural solid nodule within the basilar right lower lobe   (series 2 image 68). Recommend follow-up chest CT in 3 months to   determine the stability/resolution.     3.  2.4 x 2 cm spiculated nodule within the posterior depth of the left   breast (series 2 image 54). Recommend further evaluation with diagnostic   mammogram and breast ultrasound.

## 2019-04-15 NOTE — CONSULT NOTE ADULT - SUBJECTIVE AND OBJECTIVE BOX
[facility] Cardiology Consult  _________________________    Patient is a 84y old  Female who presents with a chief complaint of shortness of breath (2019 17:43)      HPI:  84yoF with PMH of PVD s/p stent, breast cancer s/p lumpectomy and RTx, RA, CKD, PVD s/p RLE stent, DVT on coumadin, hypothyroidism who presents to the ED with complaint of shortness of breath. The patient states she was diagnosed with   "water in the lungs" in 2018, and had been monitored over time with no intervention. Over the last week, she developed worsening ORTIZ, cough productive of dark yellow sputum, and wheezing, prompting her to visit Dr. Reynoso. She was planned for thoracentesis for this week, and her home dose of coumadin was held. Because patient was no longer able to ambulate short distances from her kitchen to her bathroom due to dyspnea, patient was brought to ED by her family and friends. She denies any fever, chills, N/V, CP palpitations, orthopnea or PND.   Dr. Reynoso had started her on lasix 40mg PO qd, with which she has had increased urine output.  Pt has known CKD. She states her normal SCr is around 2, however on recent labs had increased to about 3.17.    In the ED, T 98F, HR 74, /80, RR 16, SpO2 98% RA   Pt was given lasix 40mg IVP x 1.     This morning she feels about the same. She still feels like she cannot catch her breath.       PAST MEDICAL & SURGICAL HISTORY:  CKD (chronic kidney disease)  Prediabetes  Breast cancer: hx lumpectomy, radiation  Hypothyroid  Anarthritic Rheumatoid Disease  Mee HTN  GERD (Gastroesophageal Reflux  S/P shoulder surgery: right  Embolus: Right leg arterial stent  H/O: Hysterectomy      MEDICATIONS  (STANDING):  ALBUTerol/ipratropium for Nebulization 3 milliLiter(s) Nebulizer every 4 hours  amLODIPine   Tablet 5 milliGRAM(s) Oral daily  calcium carbonate 1250 mG  + Vitamin D (OsCal 500 + D) 1 Tablet(s) Oral daily  ergocalciferol 51831 Unit(s) Oral every week  hydroxychloroquine 200 milliGRAM(s) Oral daily  levothyroxine 50 MICROGram(s) Oral daily  pantoprazole    Tablet 40 milliGRAM(s) Oral before breakfast    MEDICATIONS  (PRN):  oxyCODONE    IR 2.5 milliGRAM(s) Oral four times a day PRN Severe Pain (7 - 10)  zolpidem 5 milliGRAM(s) Oral at bedtime PRN Insomnia      Allergies    Allegra (Unknown)  amoxicillin (Hives)  Cipro (Hives)  shellfish (Rash)    Intolerances        Social Histroy: Tobacco- , ETOH-, Illicit Drugs-    T(C): 36.6 (04-15-19 @ 04:24), Max: 36.9 (19 @ 20:52)  HR: 89 (04-15-19 @ 08:45) (84 - 93)  BP: 164/72 (04-15-19 @ 08:45) (143/65 - 166/75)  RR: 18 (04-15-19 @ 04:24) (17 - 18)  SpO2: 93% (04-15-19 @ 04:24) (93% - 100%)  I&O's Summary    2019 07:01  -  15 Apr 2019 07:00  --------------------------------------------------------  IN: 220 mL / OUT: 250 mL / NET: -30 mL        Review of Systems:  Constitutional: [ ] Fever [ ] Chills [ ] Fatigue [ ] Weight change   HEENT: [ ] Blurred vision [ ] Eye Pain [ ] Headache [ ] Runny nose [ ] Sore Throat   Respiratory: [ ] Cough [ ] Wheezing [x] Shortness of breath  Cardiovascular: [ ] Chest Pain [ ] Palpitations [ ] ORTIZ [ ] PND [ ] Orthopnea  Gastrointestinal: [ ] Abdominal Pain [ ] Diarrhea [ ] Constipation [ ] Hemorrhoids [ ] Nausea [ ] Vomiting  Genitourinary: [ ] Nocturia [ ] Dysuria [ ] Incontinence  Extremities: [ ] Swelling [ ] Joint Pain  Neurologic: [ ] Focal deficit [ ] Paresthesias [ ] Syncope  Lymphatic: [ ] Swelling [ ] Lymphadenopathy   Skin: [ ] Rash [ ] Ecchymoses [ ] Wounds [ ] Lesions  Psychiatry: [ ] Depression [ ] Suicidal/Homicidal Ideation [ ] Anxiety [ ] Sleep Disturbances  [ ] 10 point review of systems is otherwise negative except as mentioned above            [ ]Unable to obtain    PHYSICAL EXAM:  GENERAL: Alert, NAD  NECK: Supple  CHEST/LUNG: expiratory wheezing, decreased breath sounds bibasilarly.  HEART: S1 S2 normal, irregular.   ABDOMEN: Soft, Nondistended  EXTREMITIES:  No LE edema.      LABS:                        8.3    15.18 )-----------( 326      ( 15 Apr 2019 09:05 )             28.1     04-15    145  |  109<H>  |  47<H>  ----------------------------<  153<H>  3.8   |  19<L>  |  3.70<H>    Ca    9.6      15 Apr 2019 06:40  Phos  4.1     04-14    TPro  7.3  /  Alb  3.7  /  TBili  0.2  /  DBili  x   /  AST  17  /  ALT  15  /  AlkPhos  90  04-13    PT/INR - ( 2019 19:38 )   PT: 20.6 sec;   INR: 1.78 ratio         PTT - ( 2019 16:13 )  PTT:30.8 sec          Urinalysis Basic - ( 2019 09:47 )    Color: Light Yellow / Appearance: Slightly Turbid / S.009 / pH: x  Gluc: x / Ketone: Negative  / Bili: Negative / Urobili: <2 mg/dL   Blood: x / Protein: 100 mg/dL / Nitrite: Negative   Leuk Esterase: Negative / RBC: 1 /HPF / WBC 1 /HPF   Sq Epi: x / Non Sq Epi: 1 /HPF / Bacteria: TNTC        MEDICATIONS  (STANDING):  ALBUTerol/ipratropium for Nebulization 3 milliLiter(s) Nebulizer every 4 hours  amLODIPine   Tablet 5 milliGRAM(s) Oral daily  calcium carbonate 1250 mG  + Vitamin D (OsCal 500 + D) 1 Tablet(s) Oral daily  ergocalciferol 50467 Unit(s) Oral every week  hydroxychloroquine 200 milliGRAM(s) Oral daily  levothyroxine 50 MICROGram(s) Oral daily  pantoprazole    Tablet 40 milliGRAM(s) Oral before breakfast    MEDICATIONS  (PRN):  oxyCODONE    IR 2.5 milliGRAM(s) Oral four times a day PRN Severe Pain (7 - 10)  zolpidem 5 milliGRAM(s) Oral at bedtime PRN Insomnia          RADIOLOGY & ADDITIONAL TESTS:    Cardiology testing:  EKG: sinus 74, incomplete lbbb    Telemetry: sinus 60-80s

## 2019-04-16 DIAGNOSIS — I05.0 RHEUMATIC MITRAL STENOSIS: ICD-10-CM

## 2019-04-16 LAB
ANION GAP SERPL CALC-SCNC: 16 MMOL/L — SIGNIFICANT CHANGE UP (ref 5–17)
APTT BLD: 75.2 SEC — HIGH (ref 27.5–36.3)
BUN SERPL-MCNC: 47 MG/DL — HIGH (ref 7–23)
CALCIUM SERPL-MCNC: 9.7 MG/DL — SIGNIFICANT CHANGE UP (ref 8.4–10.5)
CHLORIDE SERPL-SCNC: 107 MMOL/L — SIGNIFICANT CHANGE UP (ref 96–108)
CO2 SERPL-SCNC: 21 MMOL/L — LOW (ref 22–31)
CREAT SERPL-MCNC: 3.67 MG/DL — HIGH (ref 0.5–1.3)
GLUCOSE SERPL-MCNC: 154 MG/DL — HIGH (ref 70–99)
HCT VFR BLD CALC: 29.1 % — LOW (ref 34.5–45)
HGB BLD-MCNC: 8.4 G/DL — LOW (ref 11.5–15.5)
INR BLD: 1.44 RATIO — HIGH (ref 0.88–1.16)
MCHC RBC-ENTMCNC: 23.2 PG — LOW (ref 27–34)
MCHC RBC-ENTMCNC: 28.9 GM/DL — LOW (ref 32–36)
MCV RBC AUTO: 80.4 FL — SIGNIFICANT CHANGE UP (ref 80–100)
PLATELET # BLD AUTO: 320 K/UL — SIGNIFICANT CHANGE UP (ref 150–400)
POTASSIUM SERPL-MCNC: 4 MMOL/L — SIGNIFICANT CHANGE UP (ref 3.5–5.3)
POTASSIUM SERPL-SCNC: 4 MMOL/L — SIGNIFICANT CHANGE UP (ref 3.5–5.3)
PROTHROM AB SERPL-ACNC: 16.4 SEC — HIGH (ref 10–13.1)
RAPID RVP RESULT: SIGNIFICANT CHANGE UP
RBC # BLD: 3.62 M/UL — LOW (ref 3.8–5.2)
RBC # FLD: 18.8 % — HIGH (ref 10.3–14.5)
SODIUM SERPL-SCNC: 144 MMOL/L — SIGNIFICANT CHANGE UP (ref 135–145)
WBC # BLD: 16.49 K/UL — HIGH (ref 3.8–10.5)
WBC # FLD AUTO: 16.49 K/UL — HIGH (ref 3.8–10.5)

## 2019-04-16 PROCEDURE — 99232 SBSQ HOSP IP/OBS MODERATE 35: CPT | Mod: GC

## 2019-04-16 RX ORDER — WARFARIN SODIUM 2.5 MG/1
3 TABLET ORAL ONCE
Qty: 0 | Refills: 0 | Status: COMPLETED | OUTPATIENT
Start: 2019-04-16 | End: 2019-04-16

## 2019-04-16 RX ORDER — AMLODIPINE BESYLATE 2.5 MG/1
10 TABLET ORAL DAILY
Qty: 0 | Refills: 0 | Status: DISCONTINUED | OUTPATIENT
Start: 2019-04-16 | End: 2019-04-25

## 2019-04-16 RX ORDER — AMLODIPINE BESYLATE 2.5 MG/1
5 TABLET ORAL ONCE
Qty: 0 | Refills: 0 | Status: COMPLETED | OUTPATIENT
Start: 2019-04-16 | End: 2019-04-16

## 2019-04-16 RX ORDER — IRON SUCROSE 20 MG/ML
100 INJECTION, SOLUTION INTRAVENOUS ONCE
Qty: 0 | Refills: 0 | Status: COMPLETED | OUTPATIENT
Start: 2019-04-16 | End: 2019-04-16

## 2019-04-16 RX ADMIN — Medication 3 MILLILITER(S): at 05:20

## 2019-04-16 RX ADMIN — Medication 200 MILLIGRAM(S): at 11:32

## 2019-04-16 RX ADMIN — Medication 0.5 MILLIGRAM(S): at 20:28

## 2019-04-16 RX ADMIN — ERTAPENEM SODIUM 100 MILLIGRAM(S): 1 INJECTION, POWDER, LYOPHILIZED, FOR SOLUTION INTRAMUSCULAR; INTRAVENOUS at 17:19

## 2019-04-16 RX ADMIN — OXYCODONE HYDROCHLORIDE 2.5 MILLIGRAM(S): 5 TABLET ORAL at 06:20

## 2019-04-16 RX ADMIN — Medication 50 MICROGRAM(S): at 05:20

## 2019-04-16 RX ADMIN — IRON SUCROSE 210 MILLIGRAM(S): 20 INJECTION, SOLUTION INTRAVENOUS at 21:07

## 2019-04-16 RX ADMIN — Medication 3 MILLILITER(S): at 16:22

## 2019-04-16 RX ADMIN — OXYCODONE HYDROCHLORIDE 2.5 MILLIGRAM(S): 5 TABLET ORAL at 05:20

## 2019-04-16 RX ADMIN — AMLODIPINE BESYLATE 5 MILLIGRAM(S): 2.5 TABLET ORAL at 22:50

## 2019-04-16 RX ADMIN — Medication 30 MILLIGRAM(S): at 12:28

## 2019-04-16 RX ADMIN — Medication 1 MILLIGRAM(S): at 05:20

## 2019-04-16 RX ADMIN — WARFARIN SODIUM 3 MILLIGRAM(S): 2.5 TABLET ORAL at 22:34

## 2019-04-16 RX ADMIN — AMLODIPINE BESYLATE 5 MILLIGRAM(S): 2.5 TABLET ORAL at 05:20

## 2019-04-16 RX ADMIN — Medication 1 TABLET(S): at 11:31

## 2019-04-16 RX ADMIN — Medication 0.5 MILLIGRAM(S): at 05:27

## 2019-04-16 RX ADMIN — Medication 100 MILLIGRAM(S): at 23:49

## 2019-04-16 RX ADMIN — Medication 3 MILLILITER(S): at 20:01

## 2019-04-16 RX ADMIN — OXYCODONE HYDROCHLORIDE 2.5 MILLIGRAM(S): 5 TABLET ORAL at 16:27

## 2019-04-16 RX ADMIN — Medication 3 MILLILITER(S): at 11:31

## 2019-04-16 RX ADMIN — HEPARIN SODIUM 1700 UNIT(S)/HR: 5000 INJECTION INTRAVENOUS; SUBCUTANEOUS at 09:47

## 2019-04-16 RX ADMIN — Medication 3 MILLILITER(S): at 23:50

## 2019-04-16 RX ADMIN — PANTOPRAZOLE SODIUM 40 MILLIGRAM(S): 20 TABLET, DELAYED RELEASE ORAL at 05:20

## 2019-04-16 RX ADMIN — Medication 3 MILLILITER(S): at 08:17

## 2019-04-16 NOTE — PROGRESS NOTE ADULT - ASSESSMENT
84yoF with PMH of PVD s/p stent, breast cancer s/p lumpectomy and RTx, RA, CKD, PVD s/p RLE stent, DVT on coumadin, hypothyroidism who presents for shortness of breath.    TTE  1. Mitral annular calcification and calcified mitral  leaflets with decreased diastolic opening. Mild-moderate  mitral regurgitation. Peak mitral valve gradient equals 19  mm Hg, mean transmitral valve gradient equals 13 mm Hg,  consistent with severe mitral stenosis.  2. Severely dilated left atrium.  LA volume index = 74  cc/m2.  3. Normal left ventricular internal dimensions and wall  thicknesses.  4. Normal left ventricular systolic function. No segmental  wall motion abnormalities.  5. Severe right atrial enlargement.  6. Right ventricular enlargement with normal right  ventricular systolic function.  7. Estimated pulmonary artery systolic pressure equals 52  mm Hg, assuming right atrial pressure equals 8 mm Hg,  consistent with moderate pulmonary pressures.  8. Small pericardial effusion.   No echocardiographic  evidence of pericardial tamponade.

## 2019-04-16 NOTE — PROGRESS NOTE ADULT - ASSESSMENT
84 f with h/o bilateral T1cN0 ER+ breast cancer in 2009, s/p lumpectomy and radiation, on letrozole from ([id number]).  DVT, ckd , hypothyroid,  Rheumatoid arthritis  admitted with dyspnea and cough.  maria g on ckd  and possible recurrent breast ca  ENT eval essenitally unremarkable

## 2019-04-16 NOTE — PROGRESS NOTE ADULT - ASSESSMENT
Breast CA with ? new L breast lesion  Severe Mitral stensosis  Dysphagia by history  L basilar mucoid impaction, probable infection - r/o aspiration; now afebvrile on Ertalpen;um. Associated with mod bronchosapsm  RA  Dysarthria    REC    Prednisone re-ordered: start at 30 mg po bid  Continue bronchodiltors  Swallow evaluation: will need MBS  Complete short course abx for possible aspiration  Repeat PCR ordered

## 2019-04-16 NOTE — PHYSICAL THERAPY INITIAL EVALUATION ADULT - GAIT DEVIATIONS NOTED, PT EVAL
increased time in double stance/decreased step length/decreased weight-shifting ability/decreased arnav

## 2019-04-16 NOTE — PHYSICAL THERAPY INITIAL EVALUATION ADULT - PERTINENT HX OF CURRENT PROBLEM, REHAB EVAL
Pt is a 84yoF with PMH of PVD s/p stent, breast cancer s/p lumpectomy and RTx, RA, CKD, PVD s/p RLE stent, DVT on coumadin, hypothyroidism who presents to the ED with complaint of shortness of breath. Over the last week, she developed worsening ORTIZ, cough productive of dark yellow sputum, and wheezing. DX: SOB/CHF exacerbation s/p lasix  with pleural effusion, Quesntionable Aspiration PNA, pt with new dysphagia CT head (-) for acute finding, pending speech and swallow.

## 2019-04-16 NOTE — PROGRESS NOTE ADULT - ASSESSMENT
84F with PMH of bilateral T1cN0 breast cancer s/p lumpectomy/XRT/letrozole x 5 years ([id number]), CKD, rheumatoid arthritis, PVD s/p RLE stent, DVT on coumadin, presenting with worsening breathing, found to have pleural effusion as well as left breast mass. Concern for recurrent breast cancer.     # Dyspnea:   - Pulmonary following, likely aspiration pneumonia, being treated with ertapenem    # Breast mass: 2.4 x 2cm spiculated nodule in left breast  - concerning for breast cancer, also with lung nodule (0.9cm)  - CT Head and CT A/P (non-contrast d/t CKD) without evidence of metastatic disease   - spoke with Radiology - do NOT do comprehensive breast imaging (diagnostic mammogram/ultrasound) inpatient or breast biopsies.     - patient has expressed that she would not want treatment for any cancer.  Spoke with daughter Enrique who also confirmed this.  Will re-visit, however any biopsy would have to be done as an outpatient.       Radha Jaramillo MD  Hematology/Oncology Fellow, PGY-4  pager: [phone]  After 5pm or on weekends, please page the on-call fellow. 84F with PMH of bilateral T1cN0 breast cancer s/p lumpectomy/XRT/letrozole x 5 years ([id number]), CKD, rheumatoid arthritis, PVD s/p RLE stent, DVT on coumadin, presenting with worsening breathing, found to have pleural effusion as well as left breast mass. Concern for recurrent breast cancer.     # Dyspnea:   - Pulmonary following, likely aspiration pneumonia, being treated with ertapenem    # Breast mass: 2.4 x 2cm spiculated nodule in left breast  - concerning for breast cancer, also with lung nodule (0.9cm)  - CT Head and CT A/P (non-contrast d/t CKD) without evidence of metastatic disease   - spoke with Radiology - do NOT do comprehensive breast imaging (diagnostic mammogram/ultrasound) inpatient or breast biopsies.   - will have to follow-up at [facility] for biopsy.   - Patient hesitant about chemotherapy, however if ER/SC+, may be a candidate for hormonal therapy only.  - discussed via telephone with daughter Enrique Garcia Diaz Jaramillo MD  Hematology/Oncology Fellow, PGY-4  pager: [phone]  After 5pm or on weekends, please page the on-call fellow. 84F with PMH of bilateral T1cN0 breast cancer s/p lumpectomy/XRT/letrozole x 5 years ([id number]), CKD, rheumatoid arthritis, PVD s/p RLE stent, DVT on coumadin, presenting with worsening breathing, found to have pleural effusion as well as left breast mass. Concern for recurrent breast cancer.     # Dyspnea:   - Pulmonary following, likely aspiration pneumonia, being treated with ertapenem    # Breast mass: 2.4 x 2cm spiculated nodule in left breast  - concerning for breast cancer, also with lung nodule (0.9cm)  - CT Head and CT A/P (non-contrast d/t CKD) without evidence of metastatic disease.  - CT A/P non-contrast is limited for evaluation of liver metastases.  If patient remains admitted for other reasons, would recommend MRI abdomen only for liver evaluation.  - check Ca-27.29, CEA, CA-15-3  - spoke with Radiology - do NOT do comprehensive breast imaging (diagnostic mammogram/ultrasound) inpatient or breast biopsies.   - will have to follow-up at [facility] for biopsy.   - Patient hesitant about chemotherapy, however if ER/NE+, may be a candidate for hormonal therapy only.  - discussed via telephone with daughter Enrique Garcia Diaz Jaramillo MD  Hematology/Oncology Fellow, PGY-4  pager: [phone]  After 5pm or on weekends, please page the on-call fellow. 84F with PMH of bilateral T1cN0 breast cancer s/p lumpectomy/XRT/letrozole x 5 years ([id number]), CKD, rheumatoid arthritis, PVD s/p RLE stent, DVT on coumadin, presenting with worsening breathing, found to have pleural effusion as well as left breast mass. Concern for recurrent breast cancer.     # Dyspnea:   - Pulmonary following, likely aspiration pneumonia, being treated with ertapenem    # Breast mass: 2.4 x 2cm spiculated nodule in left breast  - concerning for breast cancer, also with lung nodule (0.9cm)  - CT Head and CT A/P (non-contrast d/t CKD) without evidence of metastatic disease.  CT A/P non-contrast is limited for evaluation of liver metastases.  Will do PET scan as an outpatient.  - check Ca-27.29, CEA, CA-15-3 (order for morning labs)  - spoke with Radiology - do NOT do comprehensive breast imaging (diagnostic mammogram/ultrasound) inpatient or breast biopsies.   - will have to follow-up at [facility] for biopsy.   - Patient hesitant about chemotherapy, however if ER/WA+, may be a candidate for hormonal therapy only.  - discussed via telephone with daughter Enrique Garcia Diaz Jaramillo MD  Hematology/Oncology Fellow, PGY-4  pager: [phone]  After 5pm or on weekends, please page the on-call fellow. 84F with PMH of bilateral T1cN0 breast cancer s/p lumpectomy/XRT/letrozole x 5 years ([id number]), CKD, rheumatoid arthritis, PVD s/p RLE stent, DVT on coumadin, presenting with worsening breathing, found to have pleural effusion as well as left breast mass. Concern for recurrent breast cancer.     # Dyspnea:   - Pulmonary following, likely aspiration pneumonia, being treated with ertapenem    # Breast mass: 2.4 x 2cm spiculated nodule in left breast  - concerning for breast cancer, also with lung nodule (0.9cm)  - CT Head and CT A/P (non-contrast d/t CKD) without evidence of metastatic disease.  CT A/P non-contrast is limited for evaluation of liver metastases.  Will do PET scan as an outpatient.  - check Ca-27.29, CEA, CA-15-3 (order for morning labs)  - spoke with Radiology - do NOT do comprehensive breast imaging (diagnostic mammogram/ultrasound) inpatient or breast biopsies.   - will have to follow-up at [facility], as well as her breast surgeon, Dr. Mercy Argueta after discharge  - Patient hesitant about chemotherapy, however if ER/IN+, may be a candidate for hormonal therapy only.  - discussed via telephone with daughter Enrique Garcia Diaz Jaramillo MD  Hematology/Oncology Fellow, PGY-4  pager: [phone]  After 5pm or on weekends, please page the on-call fellow.

## 2019-04-16 NOTE — PROGRESS NOTE ADULT - ASSESSMENT
84yoF with PMH of PVD s/p stent, breast cancer s/p lumpectomy and RTx, RA, CKD, PVD s/p RLE stent, DVT on coumadin, hypothyroidism who presents to the ED with complaint of shortness of breath. has pl. effusions and breast nodule.     1- maria g on ckd  2- anemia  3- pl. effusion +/- CHF  4- htn   5- hyperparathyroidism     respiratory issues due to dysphagia and pulm rather than fluid overloaded.  hold diuretics. prednisone 30 mg bid cont   abx per pulm   cr stabilizing   give lasix prn only   cont norvasc 5 mg daily    ergocalciferol 67596 U weekly   venofer 100 mg iv

## 2019-04-16 NOTE — PHYSICAL THERAPY INITIAL EVALUATION ADULT - BALANCE TRAINING, PT EVAL
GOAL: pt will increase standing dynamic/static balance to good in order to improve safety with functional mobility in 2 weeks

## 2019-04-16 NOTE — PHYSICAL THERAPY INITIAL EVALUATION ADULT - ADDITIONAL COMMENTS
PTA Pt was independent with functional mobility, required assist with ADL's from live in 24/7 A. Pt was independently ambulating with RW in her home as well as limited distances in the community. Pt lives in a Private house with a 1st floor setup and 2-3 steps to enter. Pt owns a RW and a W/C

## 2019-04-16 NOTE — PHYSICAL THERAPY INITIAL EVALUATION ADULT - PRECAUTIONS/LIMITATIONS, REHAB EVAL
fall precautions/CT Chest 4/14: Foci of bronchiolar impaction within the basilar left lower lobe and lingula. Small bilateral pleural effusions and subsegmental atelectasis of basilar lower lobes. Partial atelectasis of the lingula. subpleural solid nodule within the basilar right lower lobe, spiculated nodule within the posterior depth of the left breast. CT Head 4/15: No evidence of acute infarction or hemorrhage Moderate chronic microvascular ischemic disease, as described above. Chronic lacunar infarction in the left serenity. CT Abd/pelvis 4/15: Small bilateral pleural effusions with bibasilar atelectasis. Small pericardial effusion. Colonic diverticulosis.

## 2019-04-17 LAB
ANION GAP SERPL CALC-SCNC: 13 MMOL/L — SIGNIFICANT CHANGE UP (ref 5–17)
APPEARANCE UR: CLEAR — SIGNIFICANT CHANGE UP
APTT BLD: 134.4 SEC — CRITICAL HIGH (ref 27.5–36.3)
APTT BLD: 99 SEC — HIGH (ref 27.5–36.3)
BILIRUB UR-MCNC: NEGATIVE — SIGNIFICANT CHANGE UP
BUN SERPL-MCNC: 50 MG/DL — HIGH (ref 7–23)
CALCIUM SERPL-MCNC: 10.1 MG/DL — SIGNIFICANT CHANGE UP (ref 8.4–10.5)
CANCER AG27-29 SERPL-ACNC: 32.5 U/ML — SIGNIFICANT CHANGE UP (ref 0–38.6)
CHLORIDE SERPL-SCNC: 107 MMOL/L — SIGNIFICANT CHANGE UP (ref 96–108)
CO2 SERPL-SCNC: 21 MMOL/L — LOW (ref 22–31)
COLOR SPEC: SIGNIFICANT CHANGE UP
CREAT ?TM UR-MCNC: 75 MG/DL — SIGNIFICANT CHANGE UP
CREAT SERPL-MCNC: 3.75 MG/DL — HIGH (ref 0.5–1.3)
CULTURE RESULTS: SIGNIFICANT CHANGE UP
DIFF PNL FLD: ABNORMAL
GLUCOSE SERPL-MCNC: 170 MG/DL — HIGH (ref 70–99)
GLUCOSE UR QL: ABNORMAL
HCT VFR BLD CALC: 27.3 % — LOW (ref 34.5–45)
HGB BLD-MCNC: 8.2 G/DL — LOW (ref 11.5–15.5)
INR BLD: 1.44 RATIO — HIGH (ref 0.88–1.16)
KETONES UR-MCNC: NEGATIVE — SIGNIFICANT CHANGE UP
LEUKOCYTE ESTERASE UR-ACNC: NEGATIVE — SIGNIFICANT CHANGE UP
MCHC RBC-ENTMCNC: 23.6 PG — LOW (ref 27–34)
MCHC RBC-ENTMCNC: 30 GM/DL — LOW (ref 32–36)
MCV RBC AUTO: 78.4 FL — LOW (ref 80–100)
NITRITE UR-MCNC: NEGATIVE — SIGNIFICANT CHANGE UP
NT-PROBNP SERPL-SCNC: HIGH PG/ML (ref 0–300)
PH UR: 6.5 — SIGNIFICANT CHANGE UP (ref 5–8)
PLATELET # BLD AUTO: 340 K/UL — SIGNIFICANT CHANGE UP (ref 150–400)
POTASSIUM SERPL-MCNC: 4.2 MMOL/L — SIGNIFICANT CHANGE UP (ref 3.5–5.3)
POTASSIUM SERPL-SCNC: 4.2 MMOL/L — SIGNIFICANT CHANGE UP (ref 3.5–5.3)
PROT ?TM UR-MCNC: 400 MG/DL — HIGH (ref 0–12)
PROT UR-MCNC: ABNORMAL
PROT/CREAT UR-RTO: 5.3 RATIO — HIGH (ref 0–0.2)
PROTHROM AB SERPL-ACNC: 16.7 SEC — HIGH (ref 10–13.1)
RBC # BLD: 3.48 M/UL — LOW (ref 3.8–5.2)
RBC # FLD: 18.6 % — HIGH (ref 10.3–14.5)
SODIUM SERPL-SCNC: 141 MMOL/L — SIGNIFICANT CHANGE UP (ref 135–145)
SP GR SPEC: 1.02 — SIGNIFICANT CHANGE UP (ref 1.01–1.02)
SPECIMEN SOURCE: SIGNIFICANT CHANGE UP
UROBILINOGEN FLD QL: NEGATIVE — SIGNIFICANT CHANGE UP
WBC # BLD: 13.08 K/UL — HIGH (ref 3.8–10.5)
WBC # FLD AUTO: 13.08 K/UL — HIGH (ref 3.8–10.5)

## 2019-04-17 RX ORDER — SENNA PLUS 8.6 MG/1
2 TABLET ORAL AT BEDTIME
Qty: 0 | Refills: 0 | Status: DISCONTINUED | OUTPATIENT
Start: 2019-04-17 | End: 2019-04-25

## 2019-04-17 RX ORDER — DOCUSATE SODIUM 100 MG
100 CAPSULE ORAL THREE TIMES A DAY
Qty: 0 | Refills: 0 | Status: DISCONTINUED | OUTPATIENT
Start: 2019-04-17 | End: 2019-04-25

## 2019-04-17 RX ORDER — LANOLIN ALCOHOL/MO/W.PET/CERES
3 CREAM (GRAM) TOPICAL AT BEDTIME
Qty: 0 | Refills: 0 | Status: DISCONTINUED | OUTPATIENT
Start: 2019-04-17 | End: 2019-04-25

## 2019-04-17 RX ORDER — POLYETHYLENE GLYCOL 3350 17 G/17G
17 POWDER, FOR SOLUTION ORAL DAILY
Qty: 0 | Refills: 0 | Status: DISCONTINUED | OUTPATIENT
Start: 2019-04-17 | End: 2019-04-25

## 2019-04-17 RX ORDER — FUROSEMIDE 40 MG
40 TABLET ORAL DAILY
Qty: 0 | Refills: 0 | Status: DISCONTINUED | OUTPATIENT
Start: 2019-04-18 | End: 2019-04-19

## 2019-04-17 RX ORDER — METOPROLOL TARTRATE 50 MG
50 TABLET ORAL DAILY
Qty: 0 | Refills: 0 | Status: DISCONTINUED | OUTPATIENT
Start: 2019-04-17 | End: 2019-04-19

## 2019-04-17 RX ADMIN — Medication 30 MILLIGRAM(S): at 18:14

## 2019-04-17 RX ADMIN — HEPARIN SODIUM 1400 UNIT(S)/HR: 5000 INJECTION INTRAVENOUS; SUBCUTANEOUS at 12:25

## 2019-04-17 RX ADMIN — Medication 0.5 MILLIGRAM(S): at 07:23

## 2019-04-17 RX ADMIN — POLYETHYLENE GLYCOL 3350 17 GRAM(S): 17 POWDER, FOR SOLUTION ORAL at 12:25

## 2019-04-17 RX ADMIN — PANTOPRAZOLE SODIUM 40 MILLIGRAM(S): 20 TABLET, DELAYED RELEASE ORAL at 05:37

## 2019-04-17 RX ADMIN — Medication 30 MILLIGRAM(S): at 05:37

## 2019-04-17 RX ADMIN — AMLODIPINE BESYLATE 10 MILLIGRAM(S): 2.5 TABLET ORAL at 05:38

## 2019-04-17 RX ADMIN — Medication 3 MILLILITER(S): at 21:54

## 2019-04-17 RX ADMIN — Medication 3 MILLILITER(S): at 13:03

## 2019-04-17 RX ADMIN — OXYCODONE HYDROCHLORIDE 2.5 MILLIGRAM(S): 5 TABLET ORAL at 23:30

## 2019-04-17 RX ADMIN — HEPARIN SODIUM 1400 UNIT(S)/HR: 5000 INJECTION INTRAVENOUS; SUBCUTANEOUS at 23:15

## 2019-04-17 RX ADMIN — Medication 3 MILLIGRAM(S): at 00:32

## 2019-04-17 RX ADMIN — Medication 1 TABLET(S): at 11:20

## 2019-04-17 RX ADMIN — Medication 3 MILLILITER(S): at 18:13

## 2019-04-17 RX ADMIN — Medication 100 MILLIGRAM(S): at 13:03

## 2019-04-17 RX ADMIN — HEPARIN SODIUM 0 UNIT(S)/HR: 5000 INJECTION INTRAVENOUS; SUBCUTANEOUS at 11:18

## 2019-04-17 RX ADMIN — Medication 3 MILLILITER(S): at 05:38

## 2019-04-17 RX ADMIN — Medication 3 MILLILITER(S): at 10:22

## 2019-04-17 RX ADMIN — Medication 200 MILLIGRAM(S): at 11:20

## 2019-04-17 RX ADMIN — Medication 3 MILLIGRAM(S): at 23:30

## 2019-04-17 RX ADMIN — ERTAPENEM SODIUM 100 MILLIGRAM(S): 1 INJECTION, POWDER, LYOPHILIZED, FOR SOLUTION INTRAMUSCULAR; INTRAVENOUS at 20:31

## 2019-04-17 RX ADMIN — Medication 0.5 MILLIGRAM(S): at 18:51

## 2019-04-17 RX ADMIN — Medication 50 MILLIGRAM(S): at 10:27

## 2019-04-17 RX ADMIN — Medication 50 MICROGRAM(S): at 05:38

## 2019-04-17 NOTE — PROGRESS NOTE ADULT - ASSESSMENT
84 f with h/o bilateral T1cN0 ER+ breast cancer in 2009, s/p lumpectomy and radiation, on letrozole from ([id number]).  DVT, ckd , hypothyroid,  Rheumatoid arthritis  admitted with dyspnea and cough.  maria g on ckd  and possible recurrent breast ca  ENT eval essenitally unremarkable  Awaiting SLP eval

## 2019-04-17 NOTE — PROGRESS NOTE ADULT - ASSESSMENT
Moderate bronchospasm, PCR negative, now responding to po prednisone: r/o asthma  Breast CA with ? new L breast lesion  Severe Mitral stensosis  Dysphagia by history  L basilar mucoid impaction, probable infection - r/o aspiration; now afebvrile on Ertalpen;um. Associated with mod bronchosapsm  RA  Dysarthria    REC    Continue prednisone at  30 mg po bid: taper 4/18 per status  Continue bronchodiltors  Swallow evaluation pending  Duration antibiotics: deferr to ID

## 2019-04-17 NOTE — PROGRESS NOTE ADULT - ASSESSMENT
84yoF with PMH of PVD s/p stent, breast cancer s/p lumpectomy and RTx, RA, CKD, PVD s/p RLE stent, DVT on coumadin, hypothyroidism who presents to the ED with complaint of shortness of breath. has pl. effusions and breast nodule.     1- maria g on ckd  2- anemia  3- pl. effusion +/- CHF  4- htn   5- hyperparathyroidism   6- severe Mitral stenosis     respiratory issues due to dysphagia and pulm bronchospasm prednisone 30 mg bid cont   abx per pulm   cr stabilizing   as I have been informed by Dr. Steven Goldberg her PCP her cr has been near this range recently therefore given pt with hx chf and pro-bnp rising resume lasix 40 mg as of am   cont norvasc 5 mg daily    ergocalciferol 56046 U weekly

## 2019-04-17 NOTE — PROVIDER CONTACT NOTE (CRITICAL VALUE NOTIFICATION) - BACKGROUND
Patient admitted for aspiration pneumonia. History of DVT, coumadin held as we wait for INR to rise for not patient on heparin.

## 2019-04-18 LAB
ALBUMIN SERPL ELPH-MCNC: 3.5 G/DL — SIGNIFICANT CHANGE UP (ref 3.3–5)
ALP SERPL-CCNC: 75 U/L — SIGNIFICANT CHANGE UP (ref 40–120)
ALT FLD-CCNC: 9 U/L — LOW (ref 10–45)
ANION GAP SERPL CALC-SCNC: 14 MMOL/L — SIGNIFICANT CHANGE UP (ref 5–17)
APTT BLD: 101.3 SEC — HIGH (ref 27.5–36.3)
APTT BLD: 73.3 SEC — HIGH (ref 27.5–36.3)
APTT BLD: 79 SEC — HIGH (ref 27.5–36.3)
AST SERPL-CCNC: 9 U/L — LOW (ref 10–40)
BCA 255 TISS QL IMSTN: 22.7 U/ML — SIGNIFICANT CHANGE UP
BILIRUB SERPL-MCNC: 0.1 MG/DL — LOW (ref 0.2–1.2)
BUN SERPL-MCNC: 56 MG/DL — HIGH (ref 7–23)
C3 SERPL-MCNC: 110 MG/DL — SIGNIFICANT CHANGE UP (ref 81–157)
C4 SERPL-MCNC: 35 MG/DL — SIGNIFICANT CHANGE UP (ref 13–39)
CALCIUM SERPL-MCNC: 10 MG/DL — SIGNIFICANT CHANGE UP (ref 8.4–10.5)
CEA SERPL-MCNC: 3.5 NG/ML — SIGNIFICANT CHANGE UP (ref 0–3.8)
CHLORIDE SERPL-SCNC: 106 MMOL/L — SIGNIFICANT CHANGE UP (ref 96–108)
CO2 SERPL-SCNC: 20 MMOL/L — LOW (ref 22–31)
CREAT SERPL-MCNC: 3.71 MG/DL — HIGH (ref 0.5–1.3)
GLUCOSE SERPL-MCNC: 266 MG/DL — HIGH (ref 70–99)
HCT VFR BLD CALC: 27.1 % — LOW (ref 34.5–45)
HGB BLD-MCNC: 8 G/DL — LOW (ref 11.5–15.5)
MCHC RBC-ENTMCNC: 23.5 PG — LOW (ref 27–34)
MCHC RBC-ENTMCNC: 29.5 GM/DL — LOW (ref 32–36)
MCV RBC AUTO: 79.7 FL — LOW (ref 80–100)
PLATELET # BLD AUTO: 365 K/UL — SIGNIFICANT CHANGE UP (ref 150–400)
POTASSIUM SERPL-MCNC: 5 MMOL/L — SIGNIFICANT CHANGE UP (ref 3.5–5.3)
POTASSIUM SERPL-SCNC: 5 MMOL/L — SIGNIFICANT CHANGE UP (ref 3.5–5.3)
PROT SERPL-MCNC: 6.2 G/DL — SIGNIFICANT CHANGE UP (ref 6–8.3)
PROT SERPL-MCNC: 6.2 G/DL — SIGNIFICANT CHANGE UP (ref 6–8.3)
PROT SERPL-MCNC: 6.3 G/DL — SIGNIFICANT CHANGE UP (ref 6–8.3)
RBC # BLD: 3.4 M/UL — LOW (ref 3.8–5.2)
RBC # FLD: 18.7 % — HIGH (ref 10.3–14.5)
SODIUM SERPL-SCNC: 140 MMOL/L — SIGNIFICANT CHANGE UP (ref 135–145)
URATE SERPL-MCNC: 7.3 MG/DL — HIGH (ref 2.5–7)
WBC # BLD: 17.13 K/UL — HIGH (ref 3.8–10.5)
WBC # FLD AUTO: 17.13 K/UL — HIGH (ref 3.8–10.5)

## 2019-04-18 RX ORDER — IRON SUCROSE 20 MG/ML
100 INJECTION, SOLUTION INTRAVENOUS EVERY 24 HOURS
Qty: 0 | Refills: 0 | Status: COMPLETED | OUTPATIENT
Start: 2019-04-18 | End: 2019-04-20

## 2019-04-18 RX ORDER — ERYTHROPOIETIN 10000 [IU]/ML
10000 INJECTION, SOLUTION INTRAVENOUS; SUBCUTANEOUS
Qty: 0 | Refills: 0 | Status: DISCONTINUED | OUTPATIENT
Start: 2019-04-18 | End: 2019-04-25

## 2019-04-18 RX ADMIN — Medication 30 MILLIGRAM(S): at 17:18

## 2019-04-18 RX ADMIN — ERTAPENEM SODIUM 100 MILLIGRAM(S): 1 INJECTION, POWDER, LYOPHILIZED, FOR SOLUTION INTRAMUSCULAR; INTRAVENOUS at 17:44

## 2019-04-18 RX ADMIN — Medication 0.5 MILLIGRAM(S): at 21:04

## 2019-04-18 RX ADMIN — HEPARIN SODIUM 1200 UNIT(S)/HR: 5000 INJECTION INTRAVENOUS; SUBCUTANEOUS at 06:43

## 2019-04-18 RX ADMIN — Medication 0.5 MILLIGRAM(S): at 06:18

## 2019-04-18 RX ADMIN — Medication 3 MILLILITER(S): at 17:18

## 2019-04-18 RX ADMIN — Medication 50 MILLIGRAM(S): at 06:38

## 2019-04-18 RX ADMIN — IRON SUCROSE 210 MILLIGRAM(S): 20 INJECTION, SOLUTION INTRAVENOUS at 16:27

## 2019-04-18 RX ADMIN — Medication 3 MILLILITER(S): at 06:18

## 2019-04-18 RX ADMIN — HEPARIN SODIUM 1200 UNIT(S)/HR: 5000 INJECTION INTRAVENOUS; SUBCUTANEOUS at 19:55

## 2019-04-18 RX ADMIN — Medication 3 MILLILITER(S): at 13:09

## 2019-04-18 RX ADMIN — Medication 1 TABLET(S): at 11:05

## 2019-04-18 RX ADMIN — PANTOPRAZOLE SODIUM 40 MILLIGRAM(S): 20 TABLET, DELAYED RELEASE ORAL at 06:18

## 2019-04-18 RX ADMIN — Medication 3 MILLIGRAM(S): at 21:03

## 2019-04-18 RX ADMIN — Medication 3 MILLILITER(S): at 21:04

## 2019-04-18 RX ADMIN — Medication 100 MILLIGRAM(S): at 00:27

## 2019-04-18 RX ADMIN — Medication 3 MILLILITER(S): at 09:58

## 2019-04-18 RX ADMIN — Medication 30 MILLIGRAM(S): at 06:18

## 2019-04-18 RX ADMIN — HEPARIN SODIUM 1200 UNIT(S)/HR: 5000 INJECTION INTRAVENOUS; SUBCUTANEOUS at 13:30

## 2019-04-18 RX ADMIN — AMLODIPINE BESYLATE 10 MILLIGRAM(S): 2.5 TABLET ORAL at 06:18

## 2019-04-18 RX ADMIN — Medication 200 MILLIGRAM(S): at 11:05

## 2019-04-18 RX ADMIN — Medication 40 MILLIGRAM(S): at 06:18

## 2019-04-18 RX ADMIN — Medication 100 MILLIGRAM(S): at 13:09

## 2019-04-18 RX ADMIN — Medication 50 MICROGRAM(S): at 06:18

## 2019-04-18 NOTE — SWALLOW BEDSIDE ASSESSMENT ADULT - MUCOSAL QUALITY
GEN: well-appearing, NAD  Head normocephalic, atraumatic. EENT: TM grey B/l, nonbulging, nonerythematous. PERRL. No eye discharge. conjunctiva and sclera clear. No nasal congestion/discharge. MMM. Posterior pharynx nonerythematous, no exudate, no vesicles. Left mandibular gum swelling and irritation tender to touch, teeth non tender to percussion. Neck supple, no adenopathy. Heart: S1S2 RRR. Lungs- CTA B/L, good air entry. Abdomen soft ntnd +BS. Extremities- moves all normally, sensation wnl, no cyanosis Skin: warm and dry, no acute rash. cap refill < 2sec
WNL

## 2019-04-18 NOTE — SWALLOW BEDSIDE ASSESSMENT ADULT - ASR SWALLOW ASPIRATION MONITOR
cough/Monitor for s/s aspiration/laryngeal penetration. If noted:  D/C p.o. intake, provide non-oral nutrition/hydration/meds, and contact this service @ [phone]/fever/gurgly voice/pneumonia/upper respiratory infection/change of breathing pattern/throat clearing

## 2019-04-18 NOTE — SWALLOW BEDSIDE ASSESSMENT ADULT - SWALLOW EVAL: FUNCTIONAL LEVEL AT TIME OF EVAL
HX Continued: Cards on board, Heart failure vs infectious related vs ?progression of ckd. Mitral valve stenosis--no intervention for now. medical management.

## 2019-04-18 NOTE — SWALLOW BEDSIDE ASSESSMENT ADULT - SLP PERTINENT HISTORY OF CURRENT PROBLEM
84yoF with PMH of PVD s/p stent, breast cancer s/p lumpectomy and RTx, RA, CKD, PVD s/p RLE stent, DVT on coumadin, hypothyroidism who presents to the ED with complaint of shortness of breath. The patient states she was diagnosed with "water in the lungs" in October 2018, and had been monitored over time with no intervention. Over the last week, she developed worsening ORTIZ, cough productive of dark yellow sputum, and wheezing, prompting her to visit Dr. Reynoso. She was planned for thoracentesis for this week, and her home dose of coumadin was held. Because patient was no longer able to ambulate short distances from her kitchen to her bathroom due to dyspnea, patient was brought to ED by her family and friends. She denies any fever, chills, N/V, CP palpitations, orthopnea or PND. Dr. Reynoso had started her on lasix 40mg PO qd, with which she has had increased urine output. Pt has known CKD. She states her normal SCr is around 2, however on recent labs had increased to about 3.17.

## 2019-04-18 NOTE — PROGRESS NOTE ADULT - ASSESSMENT
84yoF with PMH of PVD s/p stent, breast cancer s/p lumpectomy and RTx, RA, CKD, PVD s/p RLE stent, DVT on coumadin, hypothyroidism who presents to the ED with complaint of shortness of breath, found on xray to have L sided pleural effusion.

## 2019-04-18 NOTE — PROGRESS NOTE ADULT - ASSESSMENT
84yoF with PMH of PVD s/p stent, breast cancer s/p lumpectomy and RTx, RA, CKD, PVD s/p RLE stent, DVT on coumadin, hypothyroidism who presents to the ED with complaint of shortness of breath. has pl. effusions and breast nodule.     1- ckd IV  2- anemia  3- pl. effusion/ CHF  4- htn   5- hyperparathyroidism   6- severe Mitral stenosis     respiratory issues due to dysphagia and pulm bronchospasm prednisone 30 mg bid cont   abx per pulm   cr stabilizing   lasix 40 mg iv qd  cont norvasc 5 mg daily   start epogen 00585 u sq tiw along with iv venofer 100 mg iv daily x 3 d    ergocalciferol 03660 U weekly

## 2019-04-18 NOTE — SWALLOW BEDSIDE ASSESSMENT ADULT - SLP GENERAL OBSERVATIONS
Patient encountered awake and alert, upright in chair, +3L/NC, A&Ox4, +poor breath support for speech likely 2/2 ORTIZ.  Able to follow commands and responded to questions appropriately.  +Intermittent baseline cough, but absent during PO intake during this assessment.  Endorsed occasional cough while "drinking hot coffee."  C/o pharyngeal stasis on R side with intake of hard solids.  Reported hx of 18-20lb weight loss over the last year.  Stated she had PNA approx. 8 years ago.  Denied previous swallow tests/dysphagia w/u. Patient encountered awake and alert, upright in chair, +3L/NC, A&Ox4, +poor breath support for speech likely 2/2 ORTIZ.  Able to follow commands and responded to questions appropriately.  +Intermittent baseline cough, but absent during PO intake during this assessment.  Endorsed occasional cough while "drinking hot coffee."  C/o pharyngeal stasis on R side with intake of hard solids.  Reported hx of 18-20lb weight loss over the last year.  Stated she had PNA approx. 8 years ago.  Denied previous swallow tests/dysphagia w/u.  Pt agreeable to MBS after education provided re: risks/complications of aspiration, however stated she would not consume "baby food" or thickened liquids.

## 2019-04-18 NOTE — SWALLOW BEDSIDE ASSESSMENT ADULT - SWALLOW EVAL: DIAGNOSIS
Patient presents with adequate mastication skills, timely oral transit, suspected delayed trigger of the swallow and decreased elevation upon palpation, c/o pharyngeal stasis on R side with hard solids, and complete oral clearance post swallow.

## 2019-04-18 NOTE — SWALLOW BEDSIDE ASSESSMENT ADULT - COMMENTS
Per pulm, +c/o 1-2 weeks productive cough, "wheezing" with ORTIZ. She denies orthopnea, PND. Remote smoking history yet denies overt history of COPD/Asthma. She c/o coughing with po intake, and c/o "slurring" of speech. She admits to minimal pedal edema. She has h/o bilateral breast CA dx and treated in 2009 with R lumpectomy and ? L resection: states she was told that LN were negative. She was treated with postop RT and is on maintenance oral chemotherapy. She is followed by local oncologist. CXR on 4/13 is limited, rotated AP with perihilar venous congestion and non specific L opacity. CT chest now, post diuresis, shows very small bilateral effusions, RUL GG nodules, disproportionate LLL and lingular atelectasis. Her wheezing is of pulmonary origin and is coarse and c/w bronchitis, either due to micro aspiration/dysphagia, or infection (despite neg PCR in ER). Unclear if speech difficulty is central or peripheral, patient has clear symptoms of dysphagia. She is afebrile and non toxic, though has leukocytosis. Wheezing now appears to be pulmonary in origin. Pt with L basilar mucoid impaction, probable infection,r/o aspiration; now afebvrile on Ertalpen;um. Associated with mod bronchosapsm. Will need MBS. effusions m/l d/t volume overload cardiac and renal causes- now that size is reduced-no indication for thoracentesis, now responding to po prednisone: r/o asthma. As per Heme/onc, Pulm following, likely aspiration PNA, being treated with ertapenem. Breast mass: 2.4 x 2cm spiculated nodule in left breast concerning for breast cancer, also with lung nodule (0.9cm). CT Head and CT A/P (non-contrast d/t CKD) without evidence of metastatic disease. CT A/P non-contrast is limited for evaluation of liver metastases. Will do PET scan as an outpatient. Will have to follow-up at [facility], as well as her breast surgeon, Dr. Mercy Argueta after discharge. Patient hesitant about chemotherapy, however if ER/MI+, may be a candidate for hormonal therapy only. Cont below: no overt s/s of laryngeal penetration/aspiration

## 2019-04-18 NOTE — SWALLOW BEDSIDE ASSESSMENT ADULT - PHARYNGEAL PHASE
Decreased laryngeal elevation/Delayed pharyngeal swallow c/o pharyngeal stasis on R side with hard solids; pt endorsed that softer solid and liquid wash reduces stasis/Delayed pharyngeal swallow/Decreased laryngeal elevation Delayed pharyngeal swallow/Decreased laryngeal elevation

## 2019-04-18 NOTE — PROGRESS NOTE ADULT - ASSESSMENT
Moderate bronchospasm, PCR negative, now responding to po prednisone: r/o asthma  Breast CA with ? new L breast lesion  Severe Mitral stensosis  Dysphagia by history  L basilar mucoid impaction, probable infection - r/o aspiration; now afebvrile on Ertalpen;um. Associated with mod bronchosapsm  RA  Dysarthria  CKD    REC    Prednisone taper ordered  Continue bronchodiltors  Swallow evaluation pending  Duration antibiotics: deferr to ID

## 2019-04-18 NOTE — PROGRESS NOTE ADULT - ASSESSMENT
84 f with h/o bilateral T1cN0 ER+ breast cancer in 2009, s/p lumpectomy and radiation, on letrozole from ([id number]).  DVT, ckd , hypothyroid,  Rheumatoid arthritis  admitted with dyspnea and cough.  maria g on ckd  and possible recurrent breast ca  ENT eval essenitally unremarkable  Awaiting SLP eval

## 2019-04-19 LAB
% ALBUMIN: 50.1 % — SIGNIFICANT CHANGE UP
% ALPHA 1: 7.3 % — SIGNIFICANT CHANGE UP
% ALPHA 2: 15.8 % — SIGNIFICANT CHANGE UP
% BETA: 13.5 % — SIGNIFICANT CHANGE UP
% GAMMA: 13.3 % — SIGNIFICANT CHANGE UP
% M SPIKE: SIGNIFICANT CHANGE UP
ALBUMIN SERPL ELPH-MCNC: 3.1 G/DL — LOW (ref 3.6–5.5)
ALBUMIN/GLOB SERPL ELPH: 1 RATIO — SIGNIFICANT CHANGE UP
ALPHA1 GLOB SERPL ELPH-MCNC: 0.5 G/DL — HIGH (ref 0.1–0.4)
ALPHA2 GLOB SERPL ELPH-MCNC: 1 G/DL — SIGNIFICANT CHANGE UP (ref 0.5–1)
ANA TITR SER: NEGATIVE — SIGNIFICANT CHANGE UP
ANION GAP SERPL CALC-SCNC: 14 MMOL/L — SIGNIFICANT CHANGE UP (ref 5–17)
APTT BLD: 102.9 SEC — HIGH (ref 27.5–36.3)
APTT BLD: 123.8 SEC — CRITICAL HIGH (ref 27.5–36.3)
APTT BLD: 48.3 SEC — HIGH (ref 27.5–36.3)
AUTO DIFF PNL BLD: ABNORMAL
B-GLOBULIN SERPL ELPH-MCNC: 0.8 G/DL — SIGNIFICANT CHANGE UP (ref 0.5–1)
BUN SERPL-MCNC: 61 MG/DL — HIGH (ref 7–23)
C-ANCA SER-ACNC: NEGATIVE — SIGNIFICANT CHANGE UP
CALCIUM SERPL-MCNC: 10.3 MG/DL — SIGNIFICANT CHANGE UP (ref 8.4–10.5)
CHLORIDE SERPL-SCNC: 103 MMOL/L — SIGNIFICANT CHANGE UP (ref 96–108)
CO2 SERPL-SCNC: 21 MMOL/L — LOW (ref 22–31)
CREAT SERPL-MCNC: 3.91 MG/DL — HIGH (ref 0.5–1.3)
CULTURE RESULTS: SIGNIFICANT CHANGE UP
GAMMA GLOBULIN: 0.8 G/DL — SIGNIFICANT CHANGE UP (ref 0.6–1.6)
GLUCOSE SERPL-MCNC: 228 MG/DL — HIGH (ref 70–99)
HCT VFR BLD CALC: 27.6 % — LOW (ref 34.5–45)
HGB BLD-MCNC: 8.2 G/DL — LOW (ref 11.5–15.5)
INR BLD: 1.62 RATIO — HIGH (ref 0.88–1.16)
INTERPRETATION SERPL IFE-IMP: SIGNIFICANT CHANGE UP
M-SPIKE: SIGNIFICANT CHANGE UP (ref 0–0)
MCHC RBC-ENTMCNC: 23.8 PG — LOW (ref 27–34)
MCHC RBC-ENTMCNC: 29.7 GM/DL — LOW (ref 32–36)
MCV RBC AUTO: 80 FL — SIGNIFICANT CHANGE UP (ref 80–100)
P-ANCA SER-ACNC: NEGATIVE — SIGNIFICANT CHANGE UP
PLATELET # BLD AUTO: 370 K/UL — SIGNIFICANT CHANGE UP (ref 150–400)
POTASSIUM SERPL-MCNC: 5.1 MMOL/L — SIGNIFICANT CHANGE UP (ref 3.5–5.3)
POTASSIUM SERPL-SCNC: 5.1 MMOL/L — SIGNIFICANT CHANGE UP (ref 3.5–5.3)
PROT PATTERN SERPL ELPH-IMP: SIGNIFICANT CHANGE UP
PROTHROM AB SERPL-ACNC: 18.9 SEC — HIGH (ref 10–13.1)
RBC # BLD: 3.45 M/UL — LOW (ref 3.8–5.2)
RBC # FLD: 18.6 % — HIGH (ref 10.3–14.5)
SODIUM SERPL-SCNC: 138 MMOL/L — SIGNIFICANT CHANGE UP (ref 135–145)
SPECIMEN SOURCE: SIGNIFICANT CHANGE UP
WBC # BLD: 19.09 K/UL — HIGH (ref 3.8–10.5)
WBC # FLD AUTO: 19.09 K/UL — HIGH (ref 3.8–10.5)

## 2019-04-19 PROCEDURE — 74230 X-RAY XM SWLNG FUNCJ C+: CPT | Mod: 26

## 2019-04-19 RX ORDER — METOPROLOL TARTRATE 50 MG
100 TABLET ORAL DAILY
Qty: 0 | Refills: 0 | Status: DISCONTINUED | OUTPATIENT
Start: 2019-04-19 | End: 2019-04-25

## 2019-04-19 RX ORDER — METOPROLOL TARTRATE 50 MG
25 TABLET ORAL DAILY
Qty: 0 | Refills: 0 | Status: DISCONTINUED | OUTPATIENT
Start: 2019-04-19 | End: 2019-04-19

## 2019-04-19 RX ORDER — METOPROLOL TARTRATE 50 MG
50 TABLET ORAL ONCE
Qty: 0 | Refills: 0 | Status: DISCONTINUED | OUTPATIENT
Start: 2019-04-19 | End: 2019-04-19

## 2019-04-19 RX ORDER — METOPROLOL TARTRATE 50 MG
50 TABLET ORAL ONCE
Qty: 0 | Refills: 0 | Status: COMPLETED | OUTPATIENT
Start: 2019-04-19 | End: 2019-04-19

## 2019-04-19 RX ADMIN — HEPARIN SODIUM 1400 UNIT(S)/HR: 5000 INJECTION INTRAVENOUS; SUBCUTANEOUS at 09:45

## 2019-04-19 RX ADMIN — Medication 3 MILLILITER(S): at 22:40

## 2019-04-19 RX ADMIN — Medication 3 MILLILITER(S): at 05:13

## 2019-04-19 RX ADMIN — Medication 100 MILLIGRAM(S): at 12:42

## 2019-04-19 RX ADMIN — Medication 40 MILLIGRAM(S): at 05:14

## 2019-04-19 RX ADMIN — AMLODIPINE BESYLATE 10 MILLIGRAM(S): 2.5 TABLET ORAL at 05:14

## 2019-04-19 RX ADMIN — ERYTHROPOIETIN 10000 UNIT(S): 10000 INJECTION, SOLUTION INTRAVENOUS; SUBCUTANEOUS at 10:25

## 2019-04-19 RX ADMIN — Medication 200 MILLIGRAM(S): at 12:43

## 2019-04-19 RX ADMIN — Medication 50 MILLIGRAM(S): at 05:14

## 2019-04-19 RX ADMIN — HEPARIN SODIUM 1000 UNIT(S)/HR: 5000 INJECTION INTRAVENOUS; SUBCUTANEOUS at 23:04

## 2019-04-19 RX ADMIN — IRON SUCROSE 210 MILLIGRAM(S): 20 INJECTION, SOLUTION INTRAVENOUS at 16:49

## 2019-04-19 RX ADMIN — Medication 0.5 MILLIGRAM(S): at 05:13

## 2019-04-19 RX ADMIN — OXYCODONE HYDROCHLORIDE 2.5 MILLIGRAM(S): 5 TABLET ORAL at 22:40

## 2019-04-19 RX ADMIN — OXYCODONE HYDROCHLORIDE 2.5 MILLIGRAM(S): 5 TABLET ORAL at 03:45

## 2019-04-19 RX ADMIN — ERTAPENEM SODIUM 100 MILLIGRAM(S): 1 INJECTION, POWDER, LYOPHILIZED, FOR SOLUTION INTRAMUSCULAR; INTRAVENOUS at 18:01

## 2019-04-19 RX ADMIN — Medication 0.5 MILLIGRAM(S): at 17:05

## 2019-04-19 RX ADMIN — OXYCODONE HYDROCHLORIDE 2.5 MILLIGRAM(S): 5 TABLET ORAL at 17:50

## 2019-04-19 RX ADMIN — Medication 3 MILLILITER(S): at 09:57

## 2019-04-19 RX ADMIN — OXYCODONE HYDROCHLORIDE 2.5 MILLIGRAM(S): 5 TABLET ORAL at 02:58

## 2019-04-19 RX ADMIN — HEPARIN SODIUM 3500 UNIT(S): 5000 INJECTION INTRAVENOUS; SUBCUTANEOUS at 09:44

## 2019-04-19 RX ADMIN — Medication 3 MILLILITER(S): at 17:05

## 2019-04-19 RX ADMIN — Medication 20 MILLIGRAM(S): at 17:05

## 2019-04-19 RX ADMIN — Medication 50 MICROGRAM(S): at 05:14

## 2019-04-19 RX ADMIN — Medication 1 TABLET(S): at 12:42

## 2019-04-19 RX ADMIN — Medication 50 MILLIGRAM(S): at 12:42

## 2019-04-19 RX ADMIN — OXYCODONE HYDROCHLORIDE 2.5 MILLIGRAM(S): 5 TABLET ORAL at 17:05

## 2019-04-19 RX ADMIN — HEPARIN SODIUM 1200 UNIT(S)/HR: 5000 INJECTION INTRAVENOUS; SUBCUTANEOUS at 16:48

## 2019-04-19 RX ADMIN — OXYCODONE HYDROCHLORIDE 2.5 MILLIGRAM(S): 5 TABLET ORAL at 23:30

## 2019-04-19 RX ADMIN — Medication 3 MILLILITER(S): at 12:43

## 2019-04-19 RX ADMIN — Medication 30 MILLIGRAM(S): at 05:14

## 2019-04-19 NOTE — SWALLOW VFSS/MBS ASSESSMENT ADULT - UNSUCCESSFUL STRATEGIES TRIALED DURING PROCEDURE
chin tuck/productive volitional cough following clinician cue small single cup sips/productive volitional cough following clinician cue/chin tuck

## 2019-04-19 NOTE — PROGRESS NOTE ADULT - ASSESSMENT
84 f with h/o bilateral T1cN0 ER+ breast cancer in 2009, s/p lumpectomy and radiation, on letrozole from ([id number]).  DVT, ckd , hypothyroid,  Rheumatoid arthritis  admitted with dyspnea and cough.  maria g on ckd  and possible recurrent breast ca  ENT eval essenitally unremarkable

## 2019-04-19 NOTE — SWALLOW VFSS/MBS ASSESSMENT ADULT - SLP GENERAL OBSERVATIONS
Patient received awake and alert, upright and secure in CASSI chair, +3L/NC, accompanied by HHA (in waiting room).

## 2019-04-19 NOTE — SWALLOW VFSS/MBS ASSESSMENT ADULT - ADDITIONAL INFORMATION
HX Continued: Cards on board, Heart failure vs infectious related vs ?progression of ckd. Mitral valve stenosis--no intervention for now. medical management.    +calcification of thyroid cartilage and arytenoids  +dental hardware

## 2019-04-19 NOTE — SWALLOW VFSS/MBS ASSESSMENT ADULT - NS SWALLOW VFSS REC ASPIR MON
throat clearing/upper respiratory infection/change of breathing pattern/cough/gurgly voice/fever/pneumonia/Monitor for s/s aspiration/laryngeal penetration. If noted:  D/C p.o. intake, provide non-oral nutrition/hydration/meds, and contact this service @ [phone]

## 2019-04-19 NOTE — PROVIDER CONTACT NOTE (CRITICAL VALUE NOTIFICATION) - ACTION/TREATMENT ORDERED:
Follow nomogram and lower heparin drip from 14ml/hr to 12ml/hr. Continue to monitor for s/s bleeding.
Per Heather NP and Nomogram, will hold heparin for 60 minutes and then decrease rate by 300 units per hour. Will continue to monitor patient.

## 2019-04-19 NOTE — PROGRESS NOTE ADULT - ASSESSMENT
Moderate bronchospasm, PCR negative, now responding to po prednisone: r/o asthma  Breast CA with ? new L breast lesion  Severe Mitral stensosis  Dysphagia by history  L basilar mucoid impaction, probable infection - r/o aspiration; now afebvrile on Ertalpen;um. Associated with mod bronchosapsm  RA  Dysarthria  CKD    REC    Complete prednisone taper   Continue bronchodiltors  Swallow evaluation today  Duration antibiotics: deferr to ID

## 2019-04-19 NOTE — SWALLOW VFSS/MBS ASSESSMENT ADULT - ORAL PHASE
Uncontrolled bolus / spillover in jodie-pharynx Incomplete tongue to palate contact/Uncontrolled bolus / spillover in jodie-pharynx/Uncontrolled bolus / spillover in hypopharynx Uncontrolled bolus / spillover in jodie-pharynx/Uncontrolled bolus / spillover in hypopharynx/Incomplete tongue to palate contact

## 2019-04-19 NOTE — PROVIDER CONTACT NOTE (CRITICAL VALUE NOTIFICATION) - RECOMMENDATIONS
Follow nomogram and lower heparin drip from 14ml/hr to 12ml/hr.
Per Heather NP and Nomogram, will hold heparin for 60 minutes and then decrease rate by 300 units per hour.

## 2019-04-19 NOTE — SWALLOW VFSS/MBS ASSESSMENT ADULT - RECOMMENDED CONSISTENCY
Dysphagia III with nectar thick liquids via small single cup sips.    MD/team Please enter the following as provider to RN orders : 1) Crush meds or provide in purees.  2) Provide small single cup sips at slow rate. 3) No fresh fruit, canned fruit, ice, ice cream, or jello. 4) Aspiration precautions. Monitor for s/s aspiration/laryngeal penetration. If noted:  D/C p.o. intake, provide non-oral nutrition/hydration/meds

## 2019-04-19 NOTE — SWALLOW VFSS/MBS ASSESSMENT ADULT - ORAL PHASE COMMENTS
within functional limits Mild spillover to the level of the valleculae. Mild oral residue on lingual/palatal surface. Max spillover to the valleculae with passive spillover along the AE fold to the pyriform sinus; One episode of laryngeal penetration before the swallow with large bolus/consecutive sips; Spillage reduced to vallecular space only with small single cup sips. Max spillover to the level of the valleculae with passive spillover along the AE folds to the hypopharynx; Laryngeal penetration before the swallow with small single cup sip.

## 2019-04-19 NOTE — SWALLOW VFSS/MBS ASSESSMENT ADULT - COMMENTS
Per pulm, +c/o 1-2 weeks productive cough, "wheezing" with ORTIZ. She denies orthopnea, PND. Remote smoking history yet denies overt history of COPD/Asthma. She c/o coughing with po intake, and c/o "slurring" of speech. She admits to minimal pedal edema. She has h/o bilateral breast CA dx and treated in 2009 with R lumpectomy and ? L resection: states she was told that LN were negative. She was treated with postop RT and is on maintenance oral chemotherapy. She is followed by local oncologist. CXR on 4/13 is limited, rotated AP with perihilar venous congestion and non specific L opacity. CT chest now, post diuresis, shows very small bilateral effusions, RUL GG nodules, disproportionate LLL and lingular atelectasis. Her wheezing is of pulmonary origin and is coarse and c/w bronchitis, either due to micro aspiration/dysphagia, or infection (despite neg PCR in ER). Unclear if speech difficulty is central or peripheral, patient has clear symptoms of dysphagia. She is afebrile and non toxic, though has leukocytosis. Wheezing now appears to be pulmonary in origin. Pt with L basilar mucoid impaction, probable infection,r/o aspiration; now afebvrile on Ertalpen;um. Associated with mod bronchosapsm. Will need MBS. effusions m/l d/t volume overload cardiac and renal causes- now that size is reduced-no indication for thoracentesis, now responding to po prednisone: r/o asthma. As per Heme/onc, Pulm following, likely aspiration PNA, being treated with ertapenem. Breast mass: 2.4 x 2cm spiculated nodule in left breast concerning for breast cancer, also with lung nodule (0.9cm). CT Head and CT A/P (non-contrast d/t CKD) without evidence of metastatic disease. CT A/P non-contrast is limited for evaluation of liver metastases. Will do PET scan as an outpatient. Will have to follow-up at [facility], as well as her breast surgeon, Dr. Mercy Argueta after discharge. Patient hesitant about chemotherapy, however if ER/RI+, may be a candidate for hormonal therapy only. Cont below:

## 2019-04-19 NOTE — SWALLOW VFSS/MBS ASSESSMENT ADULT - DIAGNOSTIC IMPRESSIONS
Patient presents with oropharyngeal dysphagia characterized by reduced bolus formation, spillage into the pharynx prior to airway closure, and delayed trigger of the swallow with less viscous fluids, laryngeal penetration before the swallow with subsequent silent aspiration of material with nectar thick liquids and thin liquids, and multiple episodes of laryngeal penetration during the swallow without complete retrieval with thin liquids.  Chin tuck was not effective in maintaining airway protection. No aspiration noted with small single cup sips of nectar thick liquids.    Disorders: reduced linguapalatal contact, delay in trigger of the swallow reflex, reduced laryngeal closure, reduced supraglottic sensation, reduced subglottic sensation. Patient presents with oropharyngeal dysphagia with fluids. The swallow is marked by reduced bolus formation, spillage into the pharynx prior to airway closure, delayed trigger of the swallow, laryngeal penetration before the swallow with subsequent silent aspiration of nectar thick liquids and thin liquids, and multiple episodes of laryngeal penetration during the swallow without complete retrieval with thin liquids.  Chin tuck was not effective in maintaining airway protection. No aspiration noted with small single cup sips of nectar thick liquids.    Disorders: reduced linguapalatal contact, delay in trigger of the swallow reflex, reduced laryngeal closure, reduced supraglottic sensation, reduced subglottic sensation.

## 2019-04-19 NOTE — PROGRESS NOTE ADULT - ASSESSMENT
84yoF with PMH of PVD s/p stent, breast cancer s/p lumpectomy and RTx, RA, CKD, PVD s/p RLE stent, DVT on coumadin, hypothyroidism who presents to the ED with complaint of shortness of breath. has pl. effusions and breast nodule.     1- ckd IV  2- anemia  3- pl. effusion/ CHF  4- htn   5- hyperparathyroidism   6- severe Mitral stenosis     respiratory issues due to dysphagia and pulm bronchospasm prednisone 30 mg bid cont   abx per pulm   cr higher   lasix 40 mg iv x1 today and in am if cr cont to worsen will hold lasix   cont norvasc 5 mg daily   start epogen 01557 u sq tiw along with iv venofer 100 mg iv daily x 3 d    ergocalciferol 13969 U weekly

## 2019-04-20 LAB
ANION GAP SERPL CALC-SCNC: 14 MMOL/L — SIGNIFICANT CHANGE UP (ref 5–17)
APTT BLD: 57.1 SEC — HIGH (ref 27.5–36.3)
APTT BLD: 62.8 SEC — HIGH (ref 27.5–36.3)
APTT BLD: 86.6 SEC — HIGH (ref 27.5–36.3)
BUN SERPL-MCNC: 75 MG/DL — HIGH (ref 7–23)
CALCIUM SERPL-MCNC: 9.8 MG/DL — SIGNIFICANT CHANGE UP (ref 8.4–10.5)
CHLORIDE SERPL-SCNC: 105 MMOL/L — SIGNIFICANT CHANGE UP (ref 96–108)
CO2 SERPL-SCNC: 20 MMOL/L — LOW (ref 22–31)
CREAT SERPL-MCNC: 3.87 MG/DL — HIGH (ref 0.5–1.3)
CULTURE RESULTS: SIGNIFICANT CHANGE UP
GLUCOSE SERPL-MCNC: 321 MG/DL — HIGH (ref 70–99)
HCT VFR BLD CALC: 28.6 % — LOW (ref 34.5–45)
HGB BLD-MCNC: 8.3 G/DL — LOW (ref 11.5–15.5)
INR BLD: 1.65 RATIO — HIGH (ref 0.88–1.16)
MCHC RBC-ENTMCNC: 23.7 PG — LOW (ref 27–34)
MCHC RBC-ENTMCNC: 29 GM/DL — LOW (ref 32–36)
MCV RBC AUTO: 81.7 FL — SIGNIFICANT CHANGE UP (ref 80–100)
PLATELET # BLD AUTO: 379 K/UL — SIGNIFICANT CHANGE UP (ref 150–400)
POTASSIUM SERPL-MCNC: 5.3 MMOL/L — SIGNIFICANT CHANGE UP (ref 3.5–5.3)
POTASSIUM SERPL-SCNC: 5.3 MMOL/L — SIGNIFICANT CHANGE UP (ref 3.5–5.3)
PROTHROM AB SERPL-ACNC: 19.1 SEC — HIGH (ref 10–12.9)
RBC # BLD: 3.5 M/UL — LOW (ref 3.8–5.2)
RBC # FLD: 18.7 % — HIGH (ref 10.3–14.5)
SODIUM SERPL-SCNC: 139 MMOL/L — SIGNIFICANT CHANGE UP (ref 135–145)
SPECIMEN SOURCE: SIGNIFICANT CHANGE UP
WBC # BLD: 22.15 K/UL — HIGH (ref 3.8–10.5)
WBC # FLD AUTO: 22.15 K/UL — HIGH (ref 3.8–10.5)

## 2019-04-20 RX ORDER — WARFARIN SODIUM 2.5 MG/1
5 TABLET ORAL ONCE
Qty: 0 | Refills: 0 | Status: COMPLETED | OUTPATIENT
Start: 2019-04-20 | End: 2019-04-20

## 2019-04-20 RX ORDER — FUROSEMIDE 40 MG
40 TABLET ORAL DAILY
Qty: 0 | Refills: 0 | Status: DISCONTINUED | OUTPATIENT
Start: 2019-04-20 | End: 2019-04-22

## 2019-04-20 RX ADMIN — HEPARIN SODIUM 1000 UNIT(S)/HR: 5000 INJECTION INTRAVENOUS; SUBCUTANEOUS at 05:35

## 2019-04-20 RX ADMIN — AMLODIPINE BESYLATE 10 MILLIGRAM(S): 2.5 TABLET ORAL at 05:36

## 2019-04-20 RX ADMIN — Medication 100 MILLIGRAM(S): at 15:50

## 2019-04-20 RX ADMIN — Medication 0.5 MILLIGRAM(S): at 18:27

## 2019-04-20 RX ADMIN — WARFARIN SODIUM 5 MILLIGRAM(S): 2.5 TABLET ORAL at 21:22

## 2019-04-20 RX ADMIN — HEPARIN SODIUM 1200 UNIT(S)/HR: 5000 INJECTION INTRAVENOUS; SUBCUTANEOUS at 18:18

## 2019-04-20 RX ADMIN — Medication 3 MILLILITER(S): at 05:35

## 2019-04-20 RX ADMIN — OXYCODONE HYDROCHLORIDE 2.5 MILLIGRAM(S): 5 TABLET ORAL at 21:21

## 2019-04-20 RX ADMIN — Medication 20 MILLIGRAM(S): at 17:31

## 2019-04-20 RX ADMIN — Medication 50 MICROGRAM(S): at 05:36

## 2019-04-20 RX ADMIN — Medication 3 MILLILITER(S): at 17:31

## 2019-04-20 RX ADMIN — IRON SUCROSE 210 MILLIGRAM(S): 20 INJECTION, SOLUTION INTRAVENOUS at 16:37

## 2019-04-20 RX ADMIN — Medication 1 TABLET(S): at 11:23

## 2019-04-20 RX ADMIN — Medication 0.5 MILLIGRAM(S): at 05:36

## 2019-04-20 RX ADMIN — Medication 100 MILLIGRAM(S): at 05:36

## 2019-04-20 RX ADMIN — ERTAPENEM SODIUM 100 MILLIGRAM(S): 1 INJECTION, POWDER, LYOPHILIZED, FOR SOLUTION INTRAMUSCULAR; INTRAVENOUS at 21:22

## 2019-04-20 RX ADMIN — OXYCODONE HYDROCHLORIDE 2.5 MILLIGRAM(S): 5 TABLET ORAL at 22:00

## 2019-04-20 RX ADMIN — Medication 200 MILLIGRAM(S): at 11:23

## 2019-04-20 RX ADMIN — PANTOPRAZOLE SODIUM 40 MILLIGRAM(S): 20 TABLET, DELAYED RELEASE ORAL at 05:37

## 2019-04-20 RX ADMIN — Medication 40 MILLIGRAM(S): at 11:22

## 2019-04-20 RX ADMIN — HEPARIN SODIUM 3500 UNIT(S): 5000 INJECTION INTRAVENOUS; SUBCUTANEOUS at 11:42

## 2019-04-20 RX ADMIN — Medication 3 MILLILITER(S): at 11:24

## 2019-04-20 RX ADMIN — Medication 3 MILLILITER(S): at 21:22

## 2019-04-20 RX ADMIN — HEPARIN SODIUM 1200 UNIT(S)/HR: 5000 INJECTION INTRAVENOUS; SUBCUTANEOUS at 11:39

## 2019-04-20 RX ADMIN — Medication 20 MILLIGRAM(S): at 05:36

## 2019-04-20 NOTE — PROGRESS NOTE ADULT - ASSESSMENT
84yoF with PMH of PVD s/p stent, breast cancer s/p lumpectomy and RTx, RA, CKD, PVD s/p RLE stent, DVT on coumadin, hypothyroidism who presents to the ED with complaint of shortness of breath. has pl. effusions and breast nodule.     1- ckd IV  2- anemia  3- pl. effusion/ CHF  4- htn   5- hyperparathyroidism   6- severe Mitral stenosis     respiratory issues due to dysphagia and pulm bronchospasm prednisone 30 mg bid cont   abx per pulm   cr seems to be steady at this high level   lasix 40 mg po qd with goal to increase to 80 mg daily   cont norvasc 5 mg daily   epogen 09468 u sq tiw along with iv venofer x total 3 d     ergocalciferol 06185 U weekly

## 2019-04-20 NOTE — PROGRESS NOTE ADULT - ASSESSMENT
Moderate bronchospasm, PCR negative, now responding to po prednisone: r/o asthma  Breast CA with ? new L breast lesion  Severe Mitral stensosis  OP dysphagia: on dysphagia 3 diet  Probable L basilar asp pneumonia: clinically respond to abx  RA  Dysarthria  CKD    REC    Complete prednisone taper   Continue bronchodiltors  Aspiration precautions  ? complete abx on 4/21  Check RA sats

## 2019-04-21 LAB
ANION GAP SERPL CALC-SCNC: 15 MMOL/L — SIGNIFICANT CHANGE UP (ref 5–17)
APTT BLD: 93.3 SEC — HIGH (ref 27.5–36.3)
BLD GP AB SCN SERPL QL: NEGATIVE — SIGNIFICANT CHANGE UP
BUN SERPL-MCNC: 82 MG/DL — HIGH (ref 7–23)
CALCIUM SERPL-MCNC: 10.3 MG/DL — SIGNIFICANT CHANGE UP (ref 8.4–10.5)
CHLORIDE SERPL-SCNC: 107 MMOL/L — SIGNIFICANT CHANGE UP (ref 96–108)
CO2 SERPL-SCNC: 20 MMOL/L — LOW (ref 22–31)
CREAT SERPL-MCNC: 3.91 MG/DL — HIGH (ref 0.5–1.3)
GLUCOSE SERPL-MCNC: 243 MG/DL — HIGH (ref 70–99)
HCT VFR BLD CALC: 27.4 % — LOW (ref 34.5–45)
HCT VFR BLD CALC: 27.8 % — LOW (ref 34.5–45)
HCT VFR BLD CALC: 28 % — LOW (ref 34.5–45)
HCT VFR BLD CALC: 28.4 % — LOW (ref 34.5–45)
HGB BLD-MCNC: 8.1 G/DL — LOW (ref 11.5–15.5)
HGB BLD-MCNC: 8.2 G/DL — LOW (ref 11.5–15.5)
HGB BLD-MCNC: 8.7 G/DL — LOW (ref 11.5–15.5)
HGB BLD-MCNC: 9 G/DL — LOW (ref 11.5–15.5)
INR BLD: 1.51 RATIO — HIGH (ref 0.88–1.16)
MCHC RBC-ENTMCNC: 23.5 PG — LOW (ref 27–34)
MCHC RBC-ENTMCNC: 24.2 PG — LOW (ref 27–34)
MCHC RBC-ENTMCNC: 24.6 PG — LOW (ref 27–34)
MCHC RBC-ENTMCNC: 25 PG — LOW (ref 27–34)
MCHC RBC-ENTMCNC: 29.1 GM/DL — LOW (ref 32–36)
MCHC RBC-ENTMCNC: 29.9 GM/DL — LOW (ref 32–36)
MCHC RBC-ENTMCNC: 31.2 GM/DL — LOW (ref 32–36)
MCHC RBC-ENTMCNC: 31.6 GM/DL — LOW (ref 32–36)
MCV RBC AUTO: 78.7 FL — LOW (ref 80–100)
MCV RBC AUTO: 79.1 FL — LOW (ref 80–100)
MCV RBC AUTO: 80.8 FL — SIGNIFICANT CHANGE UP (ref 80–100)
MCV RBC AUTO: 80.8 FL — SIGNIFICANT CHANGE UP (ref 80–100)
PLATELET # BLD AUTO: 345 K/UL — SIGNIFICANT CHANGE UP (ref 150–400)
PLATELET # BLD AUTO: 345 K/UL — SIGNIFICANT CHANGE UP (ref 150–400)
PLATELET # BLD AUTO: 375 K/UL — SIGNIFICANT CHANGE UP (ref 150–400)
PLATELET # BLD AUTO: 384 K/UL — SIGNIFICANT CHANGE UP (ref 150–400)
POTASSIUM SERPL-MCNC: 5.3 MMOL/L — SIGNIFICANT CHANGE UP (ref 3.5–5.3)
POTASSIUM SERPL-SCNC: 5.3 MMOL/L — SIGNIFICANT CHANGE UP (ref 3.5–5.3)
PROTHROM AB SERPL-ACNC: 17.6 SEC — HIGH (ref 10–13.1)
RBC # BLD: 3.39 M/UL — LOW (ref 3.8–5.2)
RBC # BLD: 3.44 M/UL — LOW (ref 3.8–5.2)
RBC # BLD: 3.56 M/UL — LOW (ref 3.8–5.2)
RBC # BLD: 3.59 M/UL — LOW (ref 3.8–5.2)
RBC # FLD: 18.3 % — HIGH (ref 10.3–14.5)
RBC # FLD: 18.5 % — HIGH (ref 10.3–14.5)
RBC # FLD: 18.6 % — HIGH (ref 10.3–14.5)
RBC # FLD: 19.1 % — HIGH (ref 10.3–14.5)
RH IG SCN BLD-IMP: POSITIVE — SIGNIFICANT CHANGE UP
SODIUM SERPL-SCNC: 142 MMOL/L — SIGNIFICANT CHANGE UP (ref 135–145)
WBC # BLD: 25.16 K/UL — HIGH (ref 3.8–10.5)
WBC # BLD: 25.25 K/UL — HIGH (ref 3.8–10.5)
WBC # BLD: 26.4 K/UL — HIGH (ref 3.8–10.5)
WBC # BLD: 26.8 K/UL — HIGH (ref 3.8–10.5)
WBC # FLD AUTO: 25.16 K/UL — HIGH (ref 3.8–10.5)
WBC # FLD AUTO: 25.25 K/UL — HIGH (ref 3.8–10.5)
WBC # FLD AUTO: 26.4 K/UL — HIGH (ref 3.8–10.5)
WBC # FLD AUTO: 26.8 K/UL — HIGH (ref 3.8–10.5)

## 2019-04-21 PROCEDURE — 74176 CT ABD & PELVIS W/O CONTRAST: CPT | Mod: 26

## 2019-04-21 RX ORDER — OXYCODONE HYDROCHLORIDE 5 MG/1
2.5 TABLET ORAL
Qty: 0 | Refills: 0 | Status: DISCONTINUED | OUTPATIENT
Start: 2019-04-22 | End: 2019-04-25

## 2019-04-21 RX ORDER — ACETAMINOPHEN 500 MG
650 TABLET ORAL EVERY 6 HOURS
Qty: 0 | Refills: 0 | Status: DISCONTINUED | OUTPATIENT
Start: 2019-04-21 | End: 2019-04-25

## 2019-04-21 RX ADMIN — Medication 0.5 MILLIGRAM(S): at 05:42

## 2019-04-21 RX ADMIN — Medication 3 MILLILITER(S): at 05:42

## 2019-04-21 RX ADMIN — Medication 20 MILLIGRAM(S): at 05:42

## 2019-04-21 RX ADMIN — OXYCODONE HYDROCHLORIDE 2.5 MILLIGRAM(S): 5 TABLET ORAL at 21:40

## 2019-04-21 RX ADMIN — Medication 200 MILLIGRAM(S): at 13:29

## 2019-04-21 RX ADMIN — ERTAPENEM SODIUM 100 MILLIGRAM(S): 1 INJECTION, POWDER, LYOPHILIZED, FOR SOLUTION INTRAMUSCULAR; INTRAVENOUS at 17:53

## 2019-04-21 RX ADMIN — Medication 650 MILLIGRAM(S): at 18:55

## 2019-04-21 RX ADMIN — Medication 100 MILLIGRAM(S): at 05:42

## 2019-04-21 RX ADMIN — Medication 3 MILLILITER(S): at 21:27

## 2019-04-21 RX ADMIN — Medication 1 TABLET(S): at 13:29

## 2019-04-21 RX ADMIN — PANTOPRAZOLE SODIUM 40 MILLIGRAM(S): 20 TABLET, DELAYED RELEASE ORAL at 05:42

## 2019-04-21 RX ADMIN — Medication 3 MILLILITER(S): at 09:45

## 2019-04-21 RX ADMIN — Medication 3 MILLILITER(S): at 17:52

## 2019-04-21 RX ADMIN — AMLODIPINE BESYLATE 10 MILLIGRAM(S): 2.5 TABLET ORAL at 05:42

## 2019-04-21 RX ADMIN — Medication 3 MILLILITER(S): at 13:29

## 2019-04-21 RX ADMIN — Medication 650 MILLIGRAM(S): at 18:06

## 2019-04-21 RX ADMIN — Medication 50 MICROGRAM(S): at 05:42

## 2019-04-21 RX ADMIN — OXYCODONE HYDROCHLORIDE 2.5 MILLIGRAM(S): 5 TABLET ORAL at 22:30

## 2019-04-21 RX ADMIN — HEPARIN SODIUM 1200 UNIT(S)/HR: 5000 INJECTION INTRAVENOUS; SUBCUTANEOUS at 00:37

## 2019-04-21 RX ADMIN — Medication 100 MILLIGRAM(S): at 21:27

## 2019-04-21 RX ADMIN — Medication 0.5 MILLIGRAM(S): at 17:52

## 2019-04-21 RX ADMIN — Medication 100 MILLIGRAM(S): at 13:29

## 2019-04-21 RX ADMIN — Medication 40 MILLIGRAM(S): at 05:42

## 2019-04-21 NOTE — PROGRESS NOTE ADULT - ASSESSMENT
84yoF with PMH of PVD s/p stent, breast cancer s/p lumpectomy and RTx, RA, CKD, PVD s/p RLE stent, DVT on coumadin, hypothyroidism who presents to the ED with complaint of shortness of breath. has pl. effusions and breast nodule.     1- ckd IV  2- anemia  3- pl. effusion/ CHF  4- htn   5- hyperparathyroidism   6- severe Mitral stenosis     respiratory issues due to dysphagia and pulm bronchospasm prednisone taper  abx course complete   cr seems to be steady at this high level   lasix 40 mg po qd to cont   low k diet   cont norvasc 5 mg daily   epogen 91502 u sq tiw  trend hb    ergocalciferol 29970 U weekly

## 2019-04-21 NOTE — CHART NOTE - NSCHARTNOTEFT_GEN_A_CORE
Patient is a 84y old  Female who presents with a chief complaint of shortness of breath, with probable L basilar asp pneumonia, on IV abx.  Also with h/o DVT/ Afib, currently on heparin gtt to coumadin bridge.  Asked by RN to evaluate pt for R flank ecchymosis and swelling.       SUBJECTIVE: Pt seen and examined.  Denies trauma to right sides, reports mild TTP.    MEDICATIONS  (STANDING):  ALBUTerol/ipratropium for Nebulization 3 milliLiter(s) Nebulizer every 4 hours  amLODIPine   Tablet 10 milliGRAM(s) Oral daily  buDESOnide   0.5 milliGRAM(s) Respule 0.5 milliGRAM(s) Inhalation two times a day  calcium carbonate 1250 mG  + Vitamin D (OsCal 500 + D) 1 Tablet(s) Oral daily  docusate sodium 100 milliGRAM(s) Oral three times a day  epoetin zaina Injectable 42483 Unit(s) SubCutaneous <User Schedule>  ergocalciferol 46784 Unit(s) Oral every week  ertapenem  IVPB 500 milliGRAM(s) IV Intermittent every 24 hours  furosemide    Tablet 40 milliGRAM(s) Oral daily  hydroxychloroquine 200 milliGRAM(s) Oral daily  levothyroxine 50 MICROGram(s) Oral daily  metoprolol succinate  milliGRAM(s) Oral daily  pantoprazole    Tablet 40 milliGRAM(s) Oral before breakfast  predniSONE   Tablet 30 milliGRAM(s) Oral daily  predniSONE   Tablet   Oral   senna 2 Tablet(s) Oral at bedtime    MEDICATIONS  (PRN):  guaiFENesin   Syrup  (Sugar-Free) 100 milliGRAM(s) Oral every 6 hours PRN Cough  melatonin 3 milliGRAM(s) Oral at bedtime PRN Insomnia  oxyCODONE    IR 2.5 milliGRAM(s) Oral four times a day PRN Severe Pain (7 - 10)  polyethylene glycol 3350 17 Gram(s) Oral daily PRN Constipation      I&O's Summary    20 Apr 2019 07:01  -  21 Apr 2019 07:00  --------------------------------------------------------  IN: 220 mL / OUT: 1920 mL / NET: -1700 mL    21 Apr 2019 07:01  -  21 Apr 2019 16:55  --------------------------------------------------------  IN: 244 mL / OUT: 200 mL / NET: 44 mL        PHYSICAL EXAM:  VS: /69  HR 73 RR 18 T 98.1 SpO2 93% on NC  GENERAL: Lying in bed in NAD, well-developed  HEAD:  Atraumatic, Normocephalic  EYES: EOMI, PERRLA, conjunctiva and sclera clear  NECK: Supple, No JVD  CHEST/LUNG: Clear to auscultation bilaterally; No wheeze  HEART: Regular rate and rhythm; No murmurs, rubs, or gallops  ABDOMEN: Soft, Nontender, Nondistended; Bowel sounds present  EXTREMITIES:  2+ Peripheral Pulses, No clubbing, cyanosis, or edema  Musculoskeletal: R flank w/ large area of ecchymosis, R lateral abdomen with swelling, firm, mild TTP  PSYCH: AAOx3  NEUROLOGY: non-focal  SKIN: No rashes or lesions    LABS:                        8.7    26.4  )-----------( 345      ( 21 Apr 2019 14:33 )             28.0     04-21    142  |  107  |  82<H>  ----------------------------<  243<H>  5.3   |  20<L>  |  3.91<H>    Ca    10.3      21 Apr 2019 06:08      PT/INR - ( 21 Apr 2019 09:28 )   PT: 17.6 sec;   INR: 1.51 ratio         PTT - ( 21 Apr 2019 00:12 )  PTT:93.3 sec    RADIOLOGY & ADDITIONAL TESTS:  4/21 CT abd/pelvis non-contrast: A 11.1 x 5.2 x 5.4 cm fluid collection within the lateral right abdominal soft tissues. Consideration is given to a subacute hematoma as a debris fluid level is identified. Debris-filled left lower lobe bronchi with increase in segmental   atelectasis. Aspiration is suspected.      Care Discussed with Consultants/Other Providers: Dr. Heck     A/P: 84y old  Female who presents with a chief complaint of shortness of breath, with probable L basilar asp pneumonia, on IV abx.  Also with h/o DVT/ Afib, currently on heparin gtt to coumadin bridge, now with R flank ecchymosis and swelling and worsening leukocytosis.  Urgent CT abd ordered to r/o RP bleed, findings as above.  Of note, hematoma was not present on previous CT from 4/15/19. Repeat hgb currently stable at 8.7.  CT also revealed LLL bronchi with debris.      1.  Subacute hematoma R abd soft tissues  -Stop heparin gtt  -Hold coumadin dosing tonight  -Monitor CBC q6h, consider transfusion with PRBC for Hgb <7  -Monitor R abd site for expansion of hematoma outside of demarkation on skin    2.  Aspiration PNA/leukocytosis  -Continue IV ertapenem-consider broadening abx coverage   -Continue dysphagia 3 diet  -Monitor WBC  -Pan culture for temp >100.6    Case discussed at length with Dr. Heck. Will monitor closely.    SL 01614

## 2019-04-21 NOTE — PROVIDER CONTACT NOTE (OTHER) - ASSESSMENT
Patient noted to have ecchymosis to Right side/abd/flank. Hard to touch. Patient c/o pain to touch but denies pain at rest. Patient states no falls and has no recollection of injuring side. Currently on heparin gtt for DVT/a fib, bridge to coumadin in progress. As per PCA and RN who have had patient in past, ecchymosis is worsening

## 2019-04-22 LAB
ANION GAP SERPL CALC-SCNC: 15 MMOL/L — SIGNIFICANT CHANGE UP (ref 5–17)
APTT BLD: 28.9 SEC — SIGNIFICANT CHANGE UP (ref 27.5–36.3)
BUN SERPL-MCNC: 97 MG/DL — HIGH (ref 7–23)
CALCIUM SERPL-MCNC: 9.7 MG/DL — SIGNIFICANT CHANGE UP (ref 8.4–10.5)
CHLORIDE SERPL-SCNC: 104 MMOL/L — SIGNIFICANT CHANGE UP (ref 96–108)
CO2 SERPL-SCNC: 20 MMOL/L — LOW (ref 22–31)
CREAT SERPL-MCNC: 3.99 MG/DL — HIGH (ref 0.5–1.3)
GLUCOSE SERPL-MCNC: 238 MG/DL — HIGH (ref 70–99)
HCT VFR BLD CALC: 25.8 % — LOW (ref 34.5–45)
HCT VFR BLD CALC: 25.8 % — LOW (ref 34.5–45)
HCT VFR BLD CALC: 26.3 % — LOW (ref 34.5–45)
HCT VFR BLD CALC: 27 % — LOW (ref 34.5–45)
HGB BLD-MCNC: 7.6 G/DL — LOW (ref 11.5–15.5)
HGB BLD-MCNC: 8.1 G/DL — LOW (ref 11.5–15.5)
HGB BLD-MCNC: 8.4 G/DL — LOW (ref 11.5–15.5)
HGB BLD-MCNC: 8.6 G/DL — LOW (ref 11.5–15.5)
INR BLD: 1.85 RATIO — HIGH (ref 0.88–1.16)
M PROTEIN 24H UR ELPH-MRATE: SIGNIFICANT CHANGE UP
MAGNESIUM SERPL-MCNC: 2.3 MG/DL — SIGNIFICANT CHANGE UP (ref 1.6–2.6)
MCHC RBC-ENTMCNC: 24 PG — LOW (ref 27–34)
MCHC RBC-ENTMCNC: 24.8 PG — LOW (ref 27–34)
MCHC RBC-ENTMCNC: 24.8 PG — LOW (ref 27–34)
MCHC RBC-ENTMCNC: 25.7 PG — LOW (ref 27–34)
MCHC RBC-ENTMCNC: 29.5 GM/DL — LOW (ref 32–36)
MCHC RBC-ENTMCNC: 31.1 GM/DL — LOW (ref 32–36)
MCHC RBC-ENTMCNC: 31.3 GM/DL — LOW (ref 32–36)
MCHC RBC-ENTMCNC: 32.8 GM/DL — SIGNIFICANT CHANGE UP (ref 32–36)
MCV RBC AUTO: 78.3 FL — LOW (ref 80–100)
MCV RBC AUTO: 79 FL — LOW (ref 80–100)
MCV RBC AUTO: 79.6 FL — LOW (ref 80–100)
MCV RBC AUTO: 81.4 FL — SIGNIFICANT CHANGE UP (ref 80–100)
PLATELET # BLD AUTO: 290 K/UL — SIGNIFICANT CHANGE UP (ref 150–400)
PLATELET # BLD AUTO: 302 K/UL — SIGNIFICANT CHANGE UP (ref 150–400)
PLATELET # BLD AUTO: 314 K/UL — SIGNIFICANT CHANGE UP (ref 150–400)
PLATELET # BLD AUTO: 320 K/UL — SIGNIFICANT CHANGE UP (ref 150–400)
POTASSIUM SERPL-MCNC: 4.8 MMOL/L — SIGNIFICANT CHANGE UP (ref 3.5–5.3)
POTASSIUM SERPL-SCNC: 4.8 MMOL/L — SIGNIFICANT CHANGE UP (ref 3.5–5.3)
PROTHROM AB SERPL-ACNC: 21.5 SEC — HIGH (ref 10–12.9)
RBC # BLD: 3.17 M/UL — LOW (ref 3.8–5.2)
RBC # BLD: 3.27 M/UL — LOW (ref 3.8–5.2)
RBC # BLD: 3.36 M/UL — LOW (ref 3.8–5.2)
RBC # BLD: 3.39 M/UL — LOW (ref 3.8–5.2)
RBC # FLD: 18.4 % — HIGH (ref 10.3–14.5)
RBC # FLD: 18.7 % — HIGH (ref 10.3–14.5)
RBC # FLD: 18.7 % — HIGH (ref 10.3–14.5)
RBC # FLD: 18.8 % — HIGH (ref 10.3–14.5)
SODIUM SERPL-SCNC: 139 MMOL/L — SIGNIFICANT CHANGE UP (ref 135–145)
WBC # BLD: 24 K/UL — HIGH (ref 3.8–10.5)
WBC # BLD: 24.22 K/UL — HIGH (ref 3.8–10.5)
WBC # BLD: 24.3 K/UL — HIGH (ref 3.8–10.5)
WBC # BLD: 25.4 K/UL — HIGH (ref 3.8–10.5)
WBC # FLD AUTO: 24 K/UL — HIGH (ref 3.8–10.5)
WBC # FLD AUTO: 24.22 K/UL — HIGH (ref 3.8–10.5)
WBC # FLD AUTO: 24.3 K/UL — HIGH (ref 3.8–10.5)
WBC # FLD AUTO: 25.4 K/UL — HIGH (ref 3.8–10.5)

## 2019-04-22 PROCEDURE — 99233 SBSQ HOSP IP/OBS HIGH 50: CPT | Mod: GC

## 2019-04-22 PROCEDURE — 93970 EXTREMITY STUDY: CPT | Mod: 26

## 2019-04-22 RX ORDER — PANTOPRAZOLE SODIUM 20 MG/1
40 TABLET, DELAYED RELEASE ORAL ONCE
Qty: 0 | Refills: 0 | Status: COMPLETED | OUTPATIENT
Start: 2019-04-22 | End: 2019-04-22

## 2019-04-22 RX ADMIN — Medication 0.5 MILLIGRAM(S): at 17:23

## 2019-04-22 RX ADMIN — PANTOPRAZOLE SODIUM 40 MILLIGRAM(S): 20 TABLET, DELAYED RELEASE ORAL at 05:10

## 2019-04-22 RX ADMIN — Medication 0.5 MILLIGRAM(S): at 05:09

## 2019-04-22 RX ADMIN — Medication 3 MILLILITER(S): at 01:24

## 2019-04-22 RX ADMIN — Medication 3 MILLILITER(S): at 05:09

## 2019-04-22 RX ADMIN — Medication 1 TABLET(S): at 12:29

## 2019-04-22 RX ADMIN — Medication 3 MILLILITER(S): at 12:30

## 2019-04-22 RX ADMIN — OXYCODONE HYDROCHLORIDE 2.5 MILLIGRAM(S): 5 TABLET ORAL at 21:40

## 2019-04-22 RX ADMIN — Medication 3 MILLILITER(S): at 17:23

## 2019-04-22 RX ADMIN — Medication 30 MILLIGRAM(S): at 05:10

## 2019-04-22 RX ADMIN — AMLODIPINE BESYLATE 10 MILLIGRAM(S): 2.5 TABLET ORAL at 05:10

## 2019-04-22 RX ADMIN — Medication 3 MILLILITER(S): at 21:35

## 2019-04-22 RX ADMIN — Medication 50 MICROGRAM(S): at 05:09

## 2019-04-22 RX ADMIN — PANTOPRAZOLE SODIUM 40 MILLIGRAM(S): 20 TABLET, DELAYED RELEASE ORAL at 18:53

## 2019-04-22 RX ADMIN — Medication 100 MILLIGRAM(S): at 05:10

## 2019-04-22 RX ADMIN — Medication 100 MILLIGRAM(S): at 05:09

## 2019-04-22 RX ADMIN — OXYCODONE HYDROCHLORIDE 2.5 MILLIGRAM(S): 5 TABLET ORAL at 22:15

## 2019-04-22 RX ADMIN — Medication 40 MILLIGRAM(S): at 05:10

## 2019-04-22 RX ADMIN — ERGOCALCIFEROL 50000 UNIT(S): 1.25 CAPSULE ORAL at 12:29

## 2019-04-22 RX ADMIN — ERYTHROPOIETIN 10000 UNIT(S): 10000 INJECTION, SOLUTION INTRAVENOUS; SUBCUTANEOUS at 12:41

## 2019-04-22 RX ADMIN — Medication 200 MILLIGRAM(S): at 12:30

## 2019-04-22 NOTE — PROGRESS NOTE ADULT - ASSESSMENT
84yoF with PMH of PVD s/p stent, breast cancer s/p lumpectomy and RTx, RA, CKD, PVD s/p RLE stent, DVT on coumadin, hypothyroidism who presents to the ED with complaint of shortness of breath. has pl. effusions and breast nodule.     1- ckd IV  2- anemia  3- pl. effusion/ CHF  4- htn   5- hyperparathyroidism   6- severe Mitral stenosis     respiratory issues due to dysphagia and pulm bronchospasm prednisone taper  cr seems to be steady at this high level  pt is not uremic   lasix 40 mg po qd hold for now given cr still worsened however, will likely need to resume soon again.   her renal function deterioration will require renal replacement therapy in near future.   low k diet   cont norvasc 5 mg daily   epogen 92649 u sq tiw  trend hb    ergocalciferol 38190 U weekly

## 2019-04-22 NOTE — PROGRESS NOTE ADULT - ASSESSMENT
84F with PMH of bilateral T1cN0 breast cancer s/p lumpectomy/XRT/letrozole x 5 years ([id number]), CKD, rheumatoid arthritis, PVD s/p RLE stent, DVT on coumadin, presenting with worsening breathing, found to have pleural effusion as well as left breast mass. Concern for recurrent breast cancer.  Now with acute left gastrocnemius DVT.     # Left gastrocnemius DVT:   - distal, below the knee DVT - does NOT require anticoagulation  - would NOT recommend IVC filter as this a nidus for clotting, especially in someone with a malignancy.  Also, DVT is distal.   - Was on coumadin for stent in leg, however was told as an outpatient that she no longer needs anticoagulation for this.      # Anemia: microcytic  - check iron studies: iron, TIBC, ferritin  - if low, replete with PO iron  - goal hgb >8    # Dyspnea:   - Pulmonary following, likely aspiration pneumonia s/p abx, now on prednisone taper.     # Breast mass: 2.4 x 2cm spiculated nodule in left breast  - concerning for breast cancer, also with lung nodule (0.9cm)  - CT Head and CT A/P (non-contrast d/t CKD) without evidence of metastatic disease.  CT A/P non-contrast is limited for evaluation of liver metastases.  Will do PET scan as an outpatient.  - spoke with Radiology - do NOT do comprehensive breast imaging (diagnostic mammogram/ultrasound) inpatient or breast biopsies.   - will have to follow-up at [facility], as well as her breast surgeon, Dr. Mercy Argueta after discharge  - Patient hesitant about chemotherapy, however if ER/DC+, may be a candidate for hormonal therapy only.  - discussed via telephone with daughter Enrique Garcia Diaz Jaramillo MD  Hematology/Oncology Fellow, PGY-4  pager: [phone]  After 5pm or on weekends, please page the on-call fellow.

## 2019-04-22 NOTE — DIETITIAN INITIAL EVALUATION ADULT. - ADHERENCE
no specific therapeutic diet followed PTA, aide prepares meals at home. She cooks with limited salt.

## 2019-04-22 NOTE — DIETITIAN INITIAL EVALUATION ADULT. - ORAL INTAKE PTA
good/pt/aide at bedside endorse good po intake PTA. Pt has a shellfish allergy. Pt endorses taking a multivitamin supplement at home.

## 2019-04-22 NOTE — DIETITIAN INITIAL EVALUATION ADULT. - ENERGY NEEDS
Height: 67 inches, Weight: 200 pounds (dosing)  BMI: 31.3 kg/m2 IBW: 135 pounds (+/-10%), %IBW: 148%  Pertinent Info: 1+ edema to b/l feet noted, no pressure injuries noted at this time in nursing flow sheet.  Other pertinent info: Pt is 84yoF with PMH significant for PVD s/p stent, RA, DVT on coumadin, hypothyroidism, HTN, pre-DM, breast CA s/p lumpectomy and RTx, presenting with SOB. +HF per cardiology, s/p TTE with preserved LVEF and severe mitral valve stenosis, no intervention planned for now. +b/l pleural effusion and aspiration PNA, s/p antibiotics. +hematoma of abdominal wall, coumadin on hold. +leukocytosis likely secondary to steroids. +anemia. Pt also with elevated creat/BUN, nephrology following. Height: 67 inches, Weight: 200 pounds (dosing)  BMI: 31.3 kg/m2 IBW: 135 pounds (+/-10%), %IBW: 148%  Pertinent Info: 1+ edema to b/l feet noted, no pressure injuries noted at this time in nursing flow sheet.  Other pertinent info: Pt is 84yoF with PMH significant for PVD s/p stent, RA, DVT on coumadin, hypothyroidism, HTN, pre-DM, breast CA s/p lumpectomy and RTx, presenting with SOB. +HF per cardiology, s/p TTE with preserved LVEF and severe mitral valve stenosis, no intervention planned for now. +b/l pleural effusion and aspiration PNA, s/p antibiotics. +hematoma of abdominal wall, coumadin on hold. +leukocytosis likely secondary to steroids. +anemia. Pt also with elevated creat/BUN, nephrology following. +breast nodule, follow up as outpatient.

## 2019-04-22 NOTE — PROGRESS NOTE ADULT - ASSESSMENT
Moderate bronchospasm, PCR negative, now responding to po prednisone: r/o asthma  Breast CA with ? new L breast lesion  Severe Mitral stensosis  OP dysphagia: on dysphagia 3 diet  Probable L basilar asp pneumonia: clinically respond to abx  RA  Dysarthria  CKD    REC    Complete prednisone taper   Continue bronchodiltors  Aspiration precautions  DC antibiotics  Check RA sats

## 2019-04-22 NOTE — DIETITIAN INITIAL EVALUATION ADULT. - PROBLEM SELECTOR PLAN 2
Start home dose of plaquenil.  ISTOP reviewed # [id number]. Start equivalent dose of morphine 5mg 4x day PRN for severe pain.   Hydrocodone-acetaminophen 5-300mg quantity 120, 30 day supply 3/7/2019

## 2019-04-22 NOTE — DIETITIAN INITIAL EVALUATION ADULT. - NS AS NUTRI INTERV ED CONTENT
Purpose of the nutrition education/Other (specify)/Discussed nutrition recommendation to limit sodium intake with aide at bedside. Also encouraged to avoid concentrated sweets and sugar sweetened beverages in setting of elevated BG and limit orange juice consumption in setting of CKD as well. Also encouraged daily weights in setting of HF/diuretics use. Pt/aide made aware RD remains available. Purpose of the nutrition education/Other (specify)/Discussed nutrition recommendation to limit sodium intake with aide at bedside. Also encouraged to avoid concentrated sweets and sugar sweetened beverages in setting of elevated BG and limit orange juice consumption in setting of CKD as well. Also encouraged daily weights in setting of HF/diuretics use. Pt/aide made aware RD remains available. Written materials provided.

## 2019-04-22 NOTE — DIETITIAN INITIAL EVALUATION ADULT. - OTHER INFO
Pt seen for length of stay on 6TOW. Pt reports good po intake in-patient, noted with 50-60% intake per flow sheet. Aide reports pt normally eats 2 meals daily at home, pt may be eating <75% of meals as she is used to only 2 meals and not 3. No c/o nausea, vomiting, diarrhea, or constipation at this time. Pt with dysphagia, s/p SLP eval, tolerating texture modified diet currently. Pt reports at home she had no chewing/swallowing difficulties she just "needed to eat in small amounts." Pt reports a UBW of ~225 lbs ~1 year ago and she has had some non-significant wt loss over the past year as she has been eating a little less. Pt reports she was never told to monitor her potassium/phosphorus intake in setting of kidney dysfunction and she reports no h/o DM and unable to provide her usual HgbA1c. Pt is not a nutrition education candidate at this time secondary to cognitive deficit. See recommendations below.

## 2019-04-22 NOTE — PROGRESS NOTE ADULT - ASSESSMENT
84 f with h/o bilateral T1cN0 ER+ breast cancer in 2009, s/p lumpectomy and radiation, on letrozole from ([id number]).  DVT, ckd , hypothyroid,  Rheumatoid arthritis  admitted with dyspnea and cough.  maria g on ckd  and possible recurrent breast ca  ENT eval essenitally unremarkable  Aspiration risk  Leukocytosis persists but still on steroids  DVT now as well

## 2019-04-22 NOTE — DIETITIAN INITIAL EVALUATION ADULT. - NS AS NUTRI INTERV MEALS SNACK
General/healthful diet/Recommend Consistent Carbohydrate (no evening snacks) therapeutic diet in setting of steroid use, to promote adequately controlled BG with addition of low sodium restriction as well to promote adequately controlled BP and fluid status. Monitor renal indices/lytes for need for additional restrictions. Texture deferred to SLP.

## 2019-04-22 NOTE — DIETITIAN INITIAL EVALUATION ADULT. - NUTRITION INTERVENTION
Meals and Snack/Nutrition Education Nutrition Education/Collaboration and Referral of Nutrition Care/Meals and Snack

## 2019-04-23 LAB
ANION GAP SERPL CALC-SCNC: 16 MMOL/L — SIGNIFICANT CHANGE UP (ref 5–17)
APTT BLD: 28.8 SEC — SIGNIFICANT CHANGE UP (ref 27.5–36.3)
BUN SERPL-MCNC: 102 MG/DL — HIGH (ref 7–23)
CALCIUM SERPL-MCNC: 9.4 MG/DL — SIGNIFICANT CHANGE UP (ref 8.4–10.5)
CHLORIDE SERPL-SCNC: 104 MMOL/L — SIGNIFICANT CHANGE UP (ref 96–108)
CO2 SERPL-SCNC: 19 MMOL/L — LOW (ref 22–31)
CREAT SERPL-MCNC: 4.28 MG/DL — HIGH (ref 0.5–1.3)
GLUCOSE SERPL-MCNC: 247 MG/DL — HIGH (ref 70–99)
HBA1C BLD-MCNC: 7 % — HIGH (ref 4–5.6)
HCT VFR BLD CALC: 25.6 % — LOW (ref 34.5–45)
HCT VFR BLD CALC: 26.7 % — LOW (ref 34.5–45)
HGB BLD-MCNC: 8.2 G/DL — LOW (ref 11.5–15.5)
HGB BLD-MCNC: 8.3 G/DL — LOW (ref 11.5–15.5)
MCHC RBC-ENTMCNC: 24.4 PG — LOW (ref 27–34)
MCHC RBC-ENTMCNC: 25.3 PG — LOW (ref 27–34)
MCHC RBC-ENTMCNC: 30.8 GM/DL — LOW (ref 32–36)
MCHC RBC-ENTMCNC: 32.3 GM/DL — SIGNIFICANT CHANGE UP (ref 32–36)
MCV RBC AUTO: 78.4 FL — LOW (ref 80–100)
MCV RBC AUTO: 79.3 FL — LOW (ref 80–100)
PLATELET # BLD AUTO: 276 K/UL — SIGNIFICANT CHANGE UP (ref 150–400)
PLATELET # BLD AUTO: 304 K/UL — SIGNIFICANT CHANGE UP (ref 150–400)
POTASSIUM SERPL-MCNC: 5 MMOL/L — SIGNIFICANT CHANGE UP (ref 3.5–5.3)
POTASSIUM SERPL-SCNC: 5 MMOL/L — SIGNIFICANT CHANGE UP (ref 3.5–5.3)
RBC # BLD: 3.27 M/UL — LOW (ref 3.8–5.2)
RBC # BLD: 3.37 M/UL — LOW (ref 3.8–5.2)
RBC # FLD: 18.9 % — HIGH (ref 10.3–14.5)
RBC # FLD: 19.1 % — HIGH (ref 10.3–14.5)
SODIUM SERPL-SCNC: 139 MMOL/L — SIGNIFICANT CHANGE UP (ref 135–145)
WBC # BLD: 25 K/UL — HIGH (ref 3.8–10.5)
WBC # BLD: 25.2 K/UL — HIGH (ref 3.8–10.5)
WBC # FLD AUTO: 25 K/UL — HIGH (ref 3.8–10.5)
WBC # FLD AUTO: 25.2 K/UL — HIGH (ref 3.8–10.5)

## 2019-04-23 RX ADMIN — Medication 3 MILLILITER(S): at 13:05

## 2019-04-23 RX ADMIN — Medication 100 MILLIGRAM(S): at 21:08

## 2019-04-23 RX ADMIN — AMLODIPINE BESYLATE 10 MILLIGRAM(S): 2.5 TABLET ORAL at 10:29

## 2019-04-23 RX ADMIN — Medication 3 MILLILITER(S): at 21:08

## 2019-04-23 RX ADMIN — Medication 3 MILLIGRAM(S): at 01:04

## 2019-04-23 RX ADMIN — Medication 30 MILLIGRAM(S): at 10:30

## 2019-04-23 RX ADMIN — OXYCODONE HYDROCHLORIDE 2.5 MILLIGRAM(S): 5 TABLET ORAL at 21:54

## 2019-04-23 RX ADMIN — Medication 0.5 MILLIGRAM(S): at 17:24

## 2019-04-23 RX ADMIN — Medication 1 TABLET(S): at 13:04

## 2019-04-23 RX ADMIN — Medication 3 MILLILITER(S): at 01:03

## 2019-04-23 RX ADMIN — Medication 3 MILLILITER(S): at 10:28

## 2019-04-23 RX ADMIN — OXYCODONE HYDROCHLORIDE 2.5 MILLIGRAM(S): 5 TABLET ORAL at 22:50

## 2019-04-23 RX ADMIN — Medication 0.5 MILLIGRAM(S): at 10:28

## 2019-04-23 RX ADMIN — Medication 3 MILLILITER(S): at 17:24

## 2019-04-23 RX ADMIN — Medication 200 MILLIGRAM(S): at 13:04

## 2019-04-23 RX ADMIN — Medication 100 MILLIGRAM(S): at 10:30

## 2019-04-23 RX ADMIN — Medication 100 MILLIGRAM(S): at 10:29

## 2019-04-23 RX ADMIN — Medication 50 MICROGRAM(S): at 10:29

## 2019-04-23 RX ADMIN — PANTOPRAZOLE SODIUM 40 MILLIGRAM(S): 20 TABLET, DELAYED RELEASE ORAL at 10:29

## 2019-04-23 NOTE — PROGRESS NOTE ADULT - ASSESSMENT
84 f with h/o bilateral T1cN0 ER+ breast cancer in 2009, s/p lumpectomy and radiation, on letrozole from ([id number]).  DVT, ckd , hypothyroid,  Rheumatoid arthritis  admitted with dyspnea and cough.  maria g on ckd  and possible recurrent breast ca  ENT eval essenitally unremarkable  Aspiration risk  Leukocytosis persists but still on steroids  DVT now as well

## 2019-04-23 NOTE — PROGRESS NOTE ADULT - NSHPATTENDINGPLANDISCUSS_GEN_ALL_CORE
Medical team
Medical team
pt, pulm, renal np
pt, pulm,np
pt, pulm, renal np

## 2019-04-23 NOTE — PROGRESS NOTE ADULT - ASSESSMENT
84yoF with PMH of PVD s/p stent, breast cancer s/p lumpectomy and RTx, RA, CKD, PVD s/p RLE stent, DVT on coumadin, hypothyroidism who presents to the ED with complaint of shortness of breath. has pl. effusions and breast nodule.     1- DAVE on ckd IV  2- anemia  3- pl. effusion/ CHF  4- htn   5- hyperparathyroidism   6- severe Mitral stenosis     respiratory issues due to dysphagia and pulm bronchospasm prednisone taper  cr still high held lasix monitor renal function  pt is not uremic   her renal function deterioration will require renal replacement therapy in near future.   low k diet   cont norvasc 5 mg daily   epogen 72950 u sq tiw  trend hb    ergocalciferol 09416 U weekly

## 2019-04-24 ENCOUNTER — TRANSCRIPTION ENCOUNTER (OUTPATIENT)
Age: 84
End: 2019-04-24

## 2019-04-24 LAB
ANION GAP SERPL CALC-SCNC: 16 MMOL/L — SIGNIFICANT CHANGE UP (ref 5–17)
BUN SERPL-MCNC: 105 MG/DL — HIGH (ref 7–23)
CALCIUM SERPL-MCNC: 9.3 MG/DL — SIGNIFICANT CHANGE UP (ref 8.4–10.5)
CHLORIDE SERPL-SCNC: 103 MMOL/L — SIGNIFICANT CHANGE UP (ref 96–108)
CO2 SERPL-SCNC: 18 MMOL/L — LOW (ref 22–31)
CREAT SERPL-MCNC: 4.12 MG/DL — HIGH (ref 0.5–1.3)
GLUCOSE SERPL-MCNC: 263 MG/DL — HIGH (ref 70–99)
HCT VFR BLD CALC: 24.4 % — LOW (ref 34.5–45)
HGB BLD-MCNC: 8 G/DL — LOW (ref 11.5–15.5)
MCHC RBC-ENTMCNC: 25.7 PG — LOW (ref 27–34)
MCHC RBC-ENTMCNC: 32.5 GM/DL — SIGNIFICANT CHANGE UP (ref 32–36)
MCV RBC AUTO: 79.1 FL — LOW (ref 80–100)
PLATELET # BLD AUTO: 287 K/UL — SIGNIFICANT CHANGE UP (ref 150–400)
POTASSIUM SERPL-MCNC: 4.9 MMOL/L — SIGNIFICANT CHANGE UP (ref 3.5–5.3)
POTASSIUM SERPL-SCNC: 4.9 MMOL/L — SIGNIFICANT CHANGE UP (ref 3.5–5.3)
RBC # BLD: 3.09 M/UL — LOW (ref 3.8–5.2)
RBC # FLD: 18.9 % — HIGH (ref 10.3–14.5)
SODIUM SERPL-SCNC: 137 MMOL/L — SIGNIFICANT CHANGE UP (ref 135–145)
WBC # BLD: 23 K/UL — HIGH (ref 3.8–10.5)
WBC # FLD AUTO: 23 K/UL — HIGH (ref 3.8–10.5)

## 2019-04-24 PROCEDURE — 76700 US EXAM ABDOM COMPLETE: CPT | Mod: 26

## 2019-04-24 RX ADMIN — OXYCODONE HYDROCHLORIDE 2.5 MILLIGRAM(S): 5 TABLET ORAL at 11:55

## 2019-04-24 RX ADMIN — PANTOPRAZOLE SODIUM 40 MILLIGRAM(S): 20 TABLET, DELAYED RELEASE ORAL at 05:43

## 2019-04-24 RX ADMIN — Medication 3 MILLILITER(S): at 13:58

## 2019-04-24 RX ADMIN — OXYCODONE HYDROCHLORIDE 2.5 MILLIGRAM(S): 5 TABLET ORAL at 11:14

## 2019-04-24 RX ADMIN — Medication 0.5 MILLIGRAM(S): at 05:51

## 2019-04-24 RX ADMIN — Medication 0.5 MILLIGRAM(S): at 18:45

## 2019-04-24 RX ADMIN — Medication 3 MILLILITER(S): at 05:43

## 2019-04-24 RX ADMIN — Medication 100 MILLIGRAM(S): at 05:43

## 2019-04-24 RX ADMIN — Medication 50 MICROGRAM(S): at 05:44

## 2019-04-24 RX ADMIN — Medication 1 TABLET(S): at 11:11

## 2019-04-24 RX ADMIN — Medication 20 MILLIGRAM(S): at 05:43

## 2019-04-24 RX ADMIN — Medication 3 MILLILITER(S): at 11:11

## 2019-04-24 RX ADMIN — Medication 100 MILLIGRAM(S): at 05:44

## 2019-04-24 RX ADMIN — OXYCODONE HYDROCHLORIDE 2.5 MILLIGRAM(S): 5 TABLET ORAL at 22:33

## 2019-04-24 RX ADMIN — ERYTHROPOIETIN 10000 UNIT(S): 10000 INJECTION, SOLUTION INTRAVENOUS; SUBCUTANEOUS at 11:11

## 2019-04-24 RX ADMIN — Medication 200 MILLIGRAM(S): at 11:11

## 2019-04-24 RX ADMIN — Medication 3 MILLILITER(S): at 18:45

## 2019-04-24 RX ADMIN — Medication 3 MILLILITER(S): at 22:33

## 2019-04-24 RX ADMIN — AMLODIPINE BESYLATE 10 MILLIGRAM(S): 2.5 TABLET ORAL at 05:44

## 2019-04-24 NOTE — DISCHARGE NOTE PROVIDER - HOSPITAL COURSE
84yoF with PMH of PVD s/p stent, breast cancer s/p lumpectomy and RTx, RA, CKD, PVD s/p RLE stent, DVT on coumadin, hypothyroidism who presents to the ED with complaint of shortness of breath. The patient states she was diagnosed with     "water in the lungs" in October 2018, and had been monitored over time with no intervention. Over the last week, she developed worsening ORTIZ, cough productive of dark yellow sputum, and wheezing, prompting her to visit Dr. Reynoso. She was planned for thoracentesis for this week, and her home dose of coumadin was held.        DX: 	SOB/CHF exacerbation s/p IV lasix  with pleural effusion                    Quesntionable Aspiration PNA s/p ertapenem                     Dysphagia status post MBS, speech and swallow-on dysphagia 3 with nectar liquies                    slurred speech Chronic lacunar infarction in the left serenity on CT head, no acute infarct or hemorrage                    Hx of DVT s/[facility] g[facility] of [facility]                    Hx of PAF - patient has been in sinus rhythm                    CKD                     R pulm nodule, needs 3 month follow up CT                    L breast nodule, need follow mammo/US                    Large R hip hematoma - couamdin stopped . Hematoma measures 11.1 cm x 4.8 cm x 7.6 cm on US 4/24/19. 84yoF with PMH of PVD s/p stent, breast cancer s/p lumpectomy and RTx, RA, CKD, PVD s/p RLE stent, DVT on coumadin, hypothyroidism who presents to the ED with complaint of shortness of breath. The patient states she was diagnosed with     "water in the lungs" in October 2018, and had been monitored over time with no intervention. Over the last week, she developed worsening ORTIZ, cough productive of dark yellow sputum, and wheezing, prompting her to visit Dr. Reynoso. She was planned for thoracentesis for this week, and her home dose of coumadin was held.        DX: 	SOB/CHF exacerbation s/p IV lasix  with pleural effusion                    Quesntionable Aspiration PNA s/p ertapenem                     Dysphagia status post MBS, speech and swallow-on dysphagia 3 with nectar liquies                    slurred speech Chronic lacunar infarction in the left serenity on CT head, no acute infarct or hemorrage                    Hx of DVT s/p hep gtt /coumadin- seen by [facility], no need for treatment of DVT                    Hx of PAF - patient has been in sinus rhythm                    CKD                     R pulm nodule, needs 3 month follow up CT                    L breast nodule, need follow mammo/US                    Large R hip hematoma - couamdin stopped . Hematoma measures 11.1 cm x 4.8 cm x 7.6 cm on US 4/24/19.                                 Problem/Plan - 1:    ·  Problem: R/O Hematoma of abdominal wall, initial encounter.  Plan: h/h stable for now, serial h/h-will transfuse if symptomatic or <7.5    Hb 8 today    monitor size of hematoma    US states stable in size    hold heparin and coumadin for now    if severe bleeding will need to reverse INR     LE doppler gastrocnemius vn thrombus-considered to be distal/below knee dvt    heme onc reconsulted -pt remote dvt hx, and continued to stay on coumadin d/t RLE stent     so coumadin can be held safely in the setting of hematoma    Distal DVT-no need for AC, will monitor w serial US to see for progression.          Problem/Plan - 2:    ·  Problem: R/O Aspiration pneumonia of both lungs, unspecified aspiration pneumonia type, unspecified part of lung.  Plan: ground glass opacities on Ct chest    ertepenem -completed course    speech and swallow eval- cont dysphagia diet.          Problem/Plan - 3:    ·  Problem: R/O Leukocytosis, unspecified type.  Plan: likely steroid induced    monitor for now.          Problem/Plan - 4:    ·  Problem: Pleural effusion, right.  Plan: ct chest reviewed results as above, bilat small pl effusion, atelectasis, bronchiolar impaction LLL and lingula, 0.9 lung nodule, spiculated breast nodule-    Onc cs appreciated    dw pulm-findings likely pt aspiration pna cont abx,a sp precautions,     d/w Pulm -effusions are bilateral most likely d/t volume overload cardiac a nd renal causes- now that size is reduced-no indication for thoracentesis    cont nebs atc,    dc lasix.          Problem/Plan - 5:    ·  Problem: R/O Malignant neoplasm of female breast, unspecified estrogen receptor status, unspecified laterality, unspecified site of breast.  Plan: Breast nodule on CT scan    onc fu noted    outpt comprehensive breast imaging    cThead and cta/p reviewed no acute findings, no metastatic dz.          Problem/Plan - 6:    Problem: R/O CKD (chronic kidney disease) stage 5, GFR less than 15 ml/min. Plan: Cr trending up- monitor    renal cs fu    Avoid nephrotoxic agents.         Problem/Plan - 7:    ·  Problem: Rheumatoid arthritis involving both hands, unspecified rheumatoid factor presence.  Plan: home dose of plaquenil.    morphine 5mg 4x day PRN for severe pain.          Problem/Plan - 8:    ·  Problem: Hypothyroidism.  Plan: cont home dose of synthroid.          Problem/Plan - 9:    ·  Problem: GERD (gastroesophageal reflux disease).  Plan: protonix for known hx of GERD.          Problem/Plan - 10:    Problem: Prophylactic measure. Plan; VTE ppx: hold ac d/t hematoma    venodynes for now.        Discharged to rehab in stable condition. Rehab to follow H/H weekly

## 2019-04-24 NOTE — DISCHARGE NOTE PROVIDER - CARE PROVIDERS DIRECT ADDRESSES
[email or web address]_Unknown

## 2019-04-24 NOTE — DISCHARGE NOTE PROVIDER - CARE PROVIDER_API CALL
Goldberg, Steven M (MD)  Cardiovascular Disease; Internal Medicine  [address] [place] [address]  Phone: [phone]  Fax: [phone]  Follow Up Time:     Michael Leon)  [facility]; Internal Medicine; Medical Oncology  [address] [place] [address]  Phone: [phone]  Fax: [phone]  Follow Up Time:     Eladia Valenzuela (DO)  Nephrology  [address] [place] [address]  Phone: [phone]  Fax: [phone]  Follow Up Time: Goldberg, Steven M (MD)  Cardiovascular Disease; Internal Medicine  [address] [place] [address]  Phone: [phone]  Fax: [phone]  Follow Up Time:     Michael Leon)  [facility]; Internal Medicine; Medical Oncology  [address] [place] [address]  Phone: [phone]  Fax: [phone]  Follow Up Time:     Eladia Valenzuela (DO)  Nephrology  [address] [place] [address]  Phone: [phone]  Fax: [phone]  Follow Up Time:

## 2019-04-24 NOTE — PROGRESS NOTE ADULT - ATTENDING COMMENTS
Pt seen and examined at bedside.Discussions re possible need for PRC Tx vs Fe supplementatiion, given her low MCV.No need to cont w A/C w distal DVT and/or stsent placement remotely.Agree w A/P and POC of Dr Jaramillo as well.
Agree with above.   Pt with breast nodule which will need a biopsy as a out pt. CT a/p without contrast is limited to eval the liver.   Recommend sonogram with biopsy, f/u with breast surgeon and then staging as appropriate   Check breast tumor markers
Nydia Heck MD   [facility]  
Call if ID input needed over the weekend, Dr. Iris Victor is on call.    Konstantin Person MD  [phone]
D/W patient's daughter by phone ([phone]).  All questions answered to the best of my ability.    Konstantin Person MD  [phone]
Konstantin Person MD  [phone]
Konstantin Person MD  [phone]
Thank you for the courtesy of this referral.  I'll sign off at this time.     Konstantin Person MD  [phone]
discharge to Subacute Rehab
spoke to dgyovany Nunez  explained to her plan fo care and updated pt's status  pt eval-rehab    Nydia Heck MD   [facility]  
Nydia Heck MD   [facility]  
Nydia Heck MD   [facility]  
dw dgyovany Nunez on phone at length- [phone]  addressed alll her concerns  requesting mom stay in the hospital till tuesday when she is planing to visit    pt eval-rehab    Nydia Heck MD   [facility]  [phone]
mary jane dgtr Enrique on phone at length- [phone]  addressed alll her concerns  requesting mom stay in the hospital till tuesday when she is planing to visit  also requesting psych eval- mom more depressed after her 's death,  takes vicodin and ambien for anxiety, stay home unwilling to walk    pt eval-rehab    Nydia Heck MD   [facility]  [phone]
mary jane dgtr Enrique on phone at length- [phone]  addressed alll her concerns  requesting mom stay in the hospital till tuesday when she is planing to visit  also requesting psych eval- mom more depressed after her 's death,  takes vicodin and ambien for anxiety, stay home unwilling to walk    pt eval-rehab    Nydia Heck MD   [facility]  [phone]
dw dgyovany Nunez on phone at length- [phone]  addressed alll her concerns  requesting mom stay in the hospital till tuesday when she is planing to visit    pt eval-rehab    Nydia Heck MD   [facility]  [phone]
dw dgyovany Nunez on phone at length- [phone]  addressed alll her concerns  requesting mom stay in the hospital till tuesday when she is planing to visit    pt eval-rehab    Nydia Heck MD   [facility]  [phone]
left msg w dgtr Enrique - [phone] to discuss plan      pt eval-rehab    Nydia Heck MD   [facility]

## 2019-04-24 NOTE — DISCHARGE NOTE PROVIDER - PROVIDER TOKENS
PROVIDER:[TOKEN:[[id number]]],PROVIDER:[TOKEN:[[id number]]],PROVIDER:[TOKEN:[[id number]]]

## 2019-04-24 NOTE — PROGRESS NOTE ADULT - ASSESSMENT
84yoF with PMH of PVD s/p stent, breast cancer s/p lumpectomy and RTx, RA, CKD, PVD s/p RLE stent, DVT on coumadin, hypothyroidism who presents to the ED with complaint of shortness of breath. has pl. effusions and breast nodule.     1- DAVE on ckd IV  2- anemia  3- pl. effusion/ CHF  4- htn   5- hyperparathyroidism   6- severe Mitral stenosis     respiratory issues due to dysphagia and pulm bronchospasm prednisone taper  cr still high but stabilizing    hold lasix monitor renal function  pt is not uremic   low k diet   cont norvasc 5 mg daily   epogen 97792 u sq tiw   ergocalciferol 32103 U weekly

## 2019-04-24 NOTE — PROGRESS NOTE ADULT - ASSESSMENT
Moderate bronchospasm, PCR negative, now responding to po prednisone: r/o asthma  Breast CA with ? new L breast lesion  Severe Mitral stensosis  OP dysphagia: on dysphagia 3 diet  Probable L basilar asp pneumonia: clinically respond to abx  RA  Dysarthria  CKD    REC    Complete prednisone taper   Continue bronchodiltors  Aspiration precautions  Check RA sats

## 2019-04-24 NOTE — DISCHARGE NOTE PROVIDER - NSDCCPCAREPLAN_GEN_ALL_CORE_FT
PRINCIPAL DISCHARGE DIAGNOSIS  Diagnosis: Diastolic CHF, acute on chronic  Assessment and Plan of Treatment: Weigh yourself daily.  If you gain 3lbs in 3 days, or 5lbs in a week call your Health Care Provider.  Do not eat or drink foods containing more than 2000mg of salt (sodium) in your diet every day.  Call your Health Care Provider if you have any swelling or increased swelling in your feet, ankles, and/or stomach.  Take all of your medication as directed.  If you become dizzy call your Health Care Provider.        SECONDARY DISCHARGE DIAGNOSES  Diagnosis: Hematoma  Assessment and Plan of Treatment: Continue to monitor R hip. Hemoglobin weekly in rehab.   No further coumadin as per hematology    Diagnosis: Breast nodule  Assessment and Plan of Treatment: You will need to schedule outpatient PEG scan and mammogram. You will have to follow-up at [facility], as well as her breast surgeon, Dr. Mercy Argueta after discharge    Diagnosis: Pulmonary nodule  Assessment and Plan of Treatment: You will need an outpatient PET scan and a  Repeat CT chest in 3 months time to evaluate nodule.    Diagnosis: Atrial fibrillation  Assessment and Plan of Treatment: Atrial fibrillation is the most common heart rhythm problem.  The condition puts you at risk for has stroke and heart attack  It helps if you control your blood pressure, not drink more than 1-2 alcohol drinks per day, cut down on caffeine, getting treatment for over active thyroid gland, and get regular exercise  Call your doctor if you feel your heart racing or beating unusually, chest tightness or pain, lightheaded, faint, shortness of breath especially with exercise  It is important to take your heart medication as prescribed  You may be on anticoagulation which is very important to take as directed - you may need blood work to monitor drug levels      Diagnosis: Deep vein thrombosis (DVT)  Assessment and Plan of Treatment: The DVT is distal, below the knee DVT and does NOT require anticoagulation  Follow up with PMD and hematology for management      Diagnosis: Dysphagia  Assessment and Plan of Treatment: Continue current diet    Diagnosis: Pneumonia  Assessment and Plan of Treatment: Pneumonia is a lung infection that can cause a fever, cough, and trouble breathing.  Continue all antibiotics as ordered until complete.  Nutrition is important, eat small frequent meals.  Get lots of rest and drink fluids.  Call your health care provider upon arrival home from hospital and make a follow up appointment for one week.  If your cough worsens, you develop fever greater than 101', you have shaking chills, a fast heartbeat, trouble breathing and/or feel your are breathing much faster than usual, call your healthcare provider.  Make sure you wash your hands frequently.      Diagnosis: DAVE (acute kidney injury)  Assessment and Plan of Treatment: Avoid taking (NSAIDs) - (ex: Ibuprofen, Advil, Celebrex, Naprosyn)  Avoid taking any nephrotoxic agents (can harm kidneys) - Intravenous contrast for diagnostic testing, combination cold medications.  Have all medications adjusted for your renal function by your Health Care Provider.  Blood pressure control is important.  Take all medication as prescribed.      Diagnosis: Pleural effusion, right  Assessment and Plan of Treatment: Follow up with pulmonolgy for mangement    Diagnosis: Mitral valve stenosis, unspecified etiology  Assessment and Plan of Treatment: Follow up with cardiology for mangement PRINCIPAL DISCHARGE DIAGNOSIS  Diagnosis: Diastolic CHF, acute on chronic  Assessment and Plan of Treatment: Weigh yourself daily.  If you gain 3lbs in 3 days, or 5lbs in a week call your Health Care Provider.  Do not eat or drink foods containing more than 2000mg of salt (sodium) in your diet every day.  Call your Health Care Provider if you have any swelling or increased swelling in your feet, ankles, and/or stomach.  Take all of your medication as directed.  If you become dizzy call your Health Care Provider.        SECONDARY DISCHARGE DIAGNOSES  Diagnosis: Hematoma  Assessment and Plan of Treatment: Continue to monitor R hip. Hemoglobin weekly in rehab.   No further coumadin as per hematology    Diagnosis: Breast nodule  Assessment and Plan of Treatment: You will need to schedule outpatient PEG scan and mammogram. You will have to follow-up at [facility], as well as her breast surgeon, Dr. Mercy Argueta after discharge    Diagnosis: Pulmonary nodule  Assessment and Plan of Treatment: You will need an outpatient PET scan and a  Repeat CT chest in 3 months time to evaluate nodule.    Diagnosis: Atrial fibrillation  Assessment and Plan of Treatment: Atrial fibrillation is the most common heart rhythm problem.  The condition puts you at risk for has stroke and heart attack  It helps if you control your blood pressure, not drink more than 1-2 alcohol drinks per day, cut down on caffeine, getting treatment for over active thyroid gland, and get regular exercise  Call your doctor if you feel your heart racing or beating unusually, chest tightness or pain, lightheaded, faint, shortness of breath especially with exercise  It is important to take your heart medication as prescribed  You may be on anticoagulation which is very important to take as directed - you may need blood work to monitor drug levels      Diagnosis: Deep vein thrombosis (DVT)  Assessment and Plan of Treatment: The DVT is distal, below the knee DVT and does NOT require anticoagulation  Follow up with PMD and hematology for management      Diagnosis: Dysphagia  Assessment and Plan of Treatment: Continue current diet    Diagnosis: Pneumonia  Assessment and Plan of Treatment: Pneumonia is a lung infection that can cause a fever, cough, and trouble breathing.  Continue all antibiotics as ordered until complete.  Nutrition is important, eat small frequent meals.  Get lots of rest and drink fluids.  Call your health care provider upon arrival home from hospital and make a follow up appointment for one week.  If your cough worsens, you develop fever greater than 101', you have shaking chills, a fast heartbeat, trouble breathing and/or feel your are breathing much faster than usual, call your healthcare provider.  Make sure you wash your hands frequently.      Diagnosis: DAVE (acute kidney injury)  Assessment and Plan of Treatment: Avoid taking (NSAIDs) - (ex: Ibuprofen, Advil, Celebrex, Naprosyn)  Avoid taking any nephrotoxic agents (can harm kidneys) - Intravenous contrast for diagnostic testing, combination cold medications.  Have all medications adjusted for your renal function by your Health Care Provider.  Blood pressure control is important.  Take all medication as prescribed.      Diagnosis: Pleural effusion, right  Assessment and Plan of Treatment: Follow up with pulmonolgy for mangement    Diagnosis: Rheumatoid arthritis involving both hands, unspecified rheumatoid factor presence  Assessment and Plan of Treatment: Continue medications, follow up with PMD /rheumatology for managment    Diagnosis: Mitral valve stenosis, unspecified etiology  Assessment and Plan of Treatment: Follow up with cardiology for mangement- severe Mitral stenosis seen on echo 4/15/19 PRINCIPAL DISCHARGE DIAGNOSIS  Diagnosis: Diastolic CHF, acute on chronic  Assessment and Plan of Treatment: Weigh yourself daily.  If you gain 3lbs in 3 days, or 5lbs in a week call your Health Care Provider.  Do not eat or drink foods containing more than 2000mg of salt (sodium) in your diet every day.  Call your Health Care Provider if you have any swelling or increased swelling in your feet, ankles, and/or stomach.  Take all of your medication as directed.  If you become dizzy call your Health Care Provider.        SECONDARY DISCHARGE DIAGNOSES  Diagnosis: Hematoma  Assessment and Plan of Treatment: Continue to monitor R hip. Hemoglobin weekly in rehab.   No further coumadin as per hematology    Diagnosis: Breast nodule  Assessment and Plan of Treatment: You will need to schedule outpatient PEG scan and mammogram. You will have to follow-up at [facility], as well as her breast surgeon, Dr. Mercy Argueta after discharge    Diagnosis: Pulmonary nodule  Assessment and Plan of Treatment: You will need an outpatient PET scan and a  Repeat CT chest in 3 months time to evaluate nodule.    Diagnosis: Deep vein thrombosis (DVT)  Assessment and Plan of Treatment: The DVT is distal, below the knee DVT and does NOT require anticoagulation  Follow up with PMD and hematology for management      Diagnosis: Dysphagia  Assessment and Plan of Treatment: Continue current diet    Diagnosis: Pneumonia  Assessment and Plan of Treatment: Pneumonia is a lung infection that can cause a fever, cough, and trouble breathing.  Continue all antibiotics as ordered until complete.  Nutrition is important, eat small frequent meals.  Get lots of rest and drink fluids.  Call your health care provider upon arrival home from hospital and make a follow up appointment for one week.  If your cough worsens, you develop fever greater than 101', you have shaking chills, a fast heartbeat, trouble breathing and/or feel your are breathing much faster than usual, call your healthcare provider.  Make sure you wash your hands frequently.      Diagnosis: DAVE (acute kidney injury)  Assessment and Plan of Treatment: Avoid taking (NSAIDs) - (ex: Ibuprofen, Advil, Celebrex, Naprosyn)  Avoid taking any nephrotoxic agents (can harm kidneys) - Intravenous contrast for diagnostic testing, combination cold medications.  Have all medications adjusted for your renal function by your Health Care Provider.  Blood pressure control is important.  Take all medication as prescribed.      Diagnosis: Pleural effusion, right  Assessment and Plan of Treatment: Follow up with pulmonolgy for mangement    Diagnosis: Rheumatoid arthritis involving both hands, unspecified rheumatoid factor presence  Assessment and Plan of Treatment: Continue medications, follow up with PMD /rheumatology for managment    Diagnosis: Mitral valve stenosis, unspecified etiology  Assessment and Plan of Treatment: Follow up with cardiology for mangement- severe Mitral stenosis seen on echo 4/15/19 PRINCIPAL DISCHARGE DIAGNOSIS  Diagnosis: Diastolic CHF, acute on chronic  Assessment and Plan of Treatment: Weigh yourself daily.  If you gain 3lbs in 3 days, or 5lbs in a week call your Health Care Provider.  Do not eat or drink foods containing more than 2000mg of salt (sodium) in your diet every day.  Call your Health Care Provider if you have any swelling or increased swelling in your feet, ankles, and/or stomach.  Take all of your medication as directed.  If you become dizzy call your Health Care Provider.  Continue off lasix for now as per cardiology. Follow up with cardiologist as scheduled         SECONDARY DISCHARGE DIAGNOSES  Diagnosis: Hematoma  Assessment and Plan of Treatment: Continue to monitor R hip hematoma for pain/expansion. please draw Hemoglobin weekly in rehab.   No further coumadin as per hematology    Diagnosis: Breast nodule  Assessment and Plan of Treatment: You will need to schedule outpatient PEG scan and mammogram. You will have to follow-up at [facility], as well as her breast surgeon, Dr. Mercy Argueta after discharge    Diagnosis: Pulmonary nodule  Assessment and Plan of Treatment: You will need an outpatient PET scan and a  Repeat CT chest in 3 months time to evaluate nodule.    Diagnosis: Deep vein thrombosis (DVT)  Assessment and Plan of Treatment: The DVT is distal, below the knee DVT and does NOT require anticoagulation  Follow up with PMD and hematology for management      Diagnosis: Dysphagia  Assessment and Plan of Treatment: Continue current diet dysphagia 3 with nector liquids    Diagnosis: Pneumonia  Assessment and Plan of Treatment: Pneumonia is a lung infection that can cause a fever, cough, and trouble breathing.  Continue all antibiotics as ordered until complete.  Nutrition is important, eat small frequent meals.  Get lots of rest and drink fluids.  Call your health care provider upon arrival home from hospital and make a follow up appointment for one week.  If your cough worsens, you develop fever greater than 101', you have shaking chills, a fast heartbeat, trouble breathing and/or feel your are breathing much faster than usual, call your healthcare provider.  Make sure you wash your hands frequently.      Diagnosis: DAVE (acute kidney injury)  Assessment and Plan of Treatment: Avoid taking (NSAIDs) - (ex: Ibuprofen, Advil, Celebrex, Naprosyn)  Avoid taking any nephrotoxic agents (can harm kidneys) - Intravenous contrast for diagnostic testing, combination cold medications.  Have all medications adjusted for your renal function by your Health Care Provider.  Blood pressure control is important.  Take all medication as prescribed.      Diagnosis: Pleural effusion, right  Assessment and Plan of Treatment: Follow up with pulmonolgy, Dr. De Los Santos for mangement    Diagnosis: Rheumatoid arthritis involving both hands, unspecified rheumatoid factor presence  Assessment and Plan of Treatment: Continue medications, follow up with PMD /rheumatology for managment    Diagnosis: Mitral valve stenosis, unspecified etiology  Assessment and Plan of Treatment: Follow up with cardiology for mangement- severe Mitral stenosis seen on echo 4/15/19

## 2019-04-25 ENCOUNTER — TRANSCRIPTION ENCOUNTER (OUTPATIENT)
Age: 84
End: 2019-04-25

## 2019-04-25 VITALS
DIASTOLIC BLOOD PRESSURE: 69 MMHG | OXYGEN SATURATION: 94 % | SYSTOLIC BLOOD PRESSURE: 111 MMHG | TEMPERATURE: 98 F | HEART RATE: 72 BPM | RESPIRATION RATE: 18 BRPM

## 2019-04-25 PROBLEM — N18.9 CHRONIC KIDNEY DISEASE, UNSPECIFIED: Chronic | Status: ACTIVE | Noted: 2019-04-13

## 2019-04-25 LAB
ANION GAP SERPL CALC-SCNC: 15 MMOL/L — SIGNIFICANT CHANGE UP (ref 5–17)
BUN SERPL-MCNC: 102 MG/DL — HIGH (ref 7–23)
CALCIUM SERPL-MCNC: 9.2 MG/DL — SIGNIFICANT CHANGE UP (ref 8.4–10.5)
CHLORIDE SERPL-SCNC: 105 MMOL/L — SIGNIFICANT CHANGE UP (ref 96–108)
CO2 SERPL-SCNC: 17 MMOL/L — LOW (ref 22–31)
CREAT SERPL-MCNC: 3.89 MG/DL — HIGH (ref 0.5–1.3)
GLUCOSE SERPL-MCNC: 275 MG/DL — HIGH (ref 70–99)
HCT VFR BLD CALC: 25.2 % — LOW (ref 34.5–45)
HGB BLD-MCNC: 8.1 G/DL — LOW (ref 11.5–15.5)
INR BLD: 1.32 RATIO — HIGH (ref 0.88–1.16)
MCHC RBC-ENTMCNC: 25.5 PG — LOW (ref 27–34)
MCHC RBC-ENTMCNC: 32 GM/DL — SIGNIFICANT CHANGE UP (ref 32–36)
MCV RBC AUTO: 79.7 FL — LOW (ref 80–100)
PLATELET # BLD AUTO: 277 K/UL — SIGNIFICANT CHANGE UP (ref 150–400)
POTASSIUM SERPL-MCNC: 5 MMOL/L — SIGNIFICANT CHANGE UP (ref 3.5–5.3)
POTASSIUM SERPL-SCNC: 5 MMOL/L — SIGNIFICANT CHANGE UP (ref 3.5–5.3)
PROTHROM AB SERPL-ACNC: 15.2 SEC — HIGH (ref 10–12.9)
RBC # BLD: 3.16 M/UL — LOW (ref 3.8–5.2)
RBC # FLD: 19.9 % — HIGH (ref 10.3–14.5)
SODIUM SERPL-SCNC: 137 MMOL/L — SIGNIFICANT CHANGE UP (ref 135–145)
WBC # BLD: 24.6 K/UL — HIGH (ref 3.8–10.5)
WBC # FLD AUTO: 24.6 K/UL — HIGH (ref 3.8–10.5)

## 2019-04-25 PROCEDURE — 94640 AIRWAY INHALATION TREATMENT: CPT

## 2019-04-25 PROCEDURE — 93005 ELECTROCARDIOGRAM TRACING: CPT

## 2019-04-25 PROCEDURE — 87798 DETECT AGENT NOS DNA AMP: CPT

## 2019-04-25 PROCEDURE — 82435 ASSAY OF BLOOD CHLORIDE: CPT

## 2019-04-25 PROCEDURE — 83735 ASSAY OF MAGNESIUM: CPT

## 2019-04-25 PROCEDURE — 82330 ASSAY OF CALCIUM: CPT

## 2019-04-25 PROCEDURE — 87040 BLOOD CULTURE FOR BACTERIA: CPT

## 2019-04-25 PROCEDURE — 84165 PROTEIN E-PHORESIS SERUM: CPT

## 2019-04-25 PROCEDURE — 85730 THROMBOPLASTIN TIME PARTIAL: CPT

## 2019-04-25 PROCEDURE — 97161 PT EVAL LOW COMPLEX 20 MIN: CPT

## 2019-04-25 PROCEDURE — 76700 US EXAM ABDOM COMPLETE: CPT

## 2019-04-25 PROCEDURE — 86325 OTHER IMMUNOELECTROPHORESIS: CPT

## 2019-04-25 PROCEDURE — 82306 VITAMIN D 25 HYDROXY: CPT

## 2019-04-25 PROCEDURE — 83605 ASSAY OF LACTIC ACID: CPT

## 2019-04-25 PROCEDURE — 85027 COMPLETE CBC AUTOMATED: CPT

## 2019-04-25 PROCEDURE — 82947 ASSAY GLUCOSE BLOOD QUANT: CPT

## 2019-04-25 PROCEDURE — 86036 ANCA SCREEN EACH ANTIBODY: CPT

## 2019-04-25 PROCEDURE — 71250 CT THORAX DX C-: CPT

## 2019-04-25 PROCEDURE — 86160 COMPLEMENT ANTIGEN: CPT

## 2019-04-25 PROCEDURE — 86300 IMMUNOASSAY TUMOR CA 15-3: CPT

## 2019-04-25 PROCEDURE — 84145 PROCALCITONIN (PCT): CPT

## 2019-04-25 PROCEDURE — 83970 ASSAY OF PARATHORMONE: CPT

## 2019-04-25 PROCEDURE — 87633 RESP VIRUS 12-25 TARGETS: CPT

## 2019-04-25 PROCEDURE — 86334 IMMUNOFIX E-PHORESIS SERUM: CPT

## 2019-04-25 PROCEDURE — 85045 AUTOMATED RETICULOCYTE COUNT: CPT

## 2019-04-25 PROCEDURE — 87581 M.PNEUMON DNA AMP PROBE: CPT

## 2019-04-25 PROCEDURE — 83036 HEMOGLOBIN GLYCOSYLATED A1C: CPT

## 2019-04-25 PROCEDURE — 80061 LIPID PANEL: CPT

## 2019-04-25 PROCEDURE — 84132 ASSAY OF SERUM POTASSIUM: CPT

## 2019-04-25 PROCEDURE — 80053 COMPREHEN METABOLIC PANEL: CPT

## 2019-04-25 PROCEDURE — 82310 ASSAY OF CALCIUM: CPT

## 2019-04-25 PROCEDURE — 84156 ASSAY OF PROTEIN URINE: CPT

## 2019-04-25 PROCEDURE — 82570 ASSAY OF URINE CREATININE: CPT

## 2019-04-25 PROCEDURE — 93970 EXTREMITY STUDY: CPT

## 2019-04-25 PROCEDURE — 70450 CT HEAD/BRAIN W/O DYE: CPT

## 2019-04-25 PROCEDURE — 84484 ASSAY OF TROPONIN QUANT: CPT

## 2019-04-25 PROCEDURE — 81001 URINALYSIS AUTO W/SCOPE: CPT

## 2019-04-25 PROCEDURE — 84550 ASSAY OF BLOOD/URIC ACID: CPT

## 2019-04-25 PROCEDURE — 85610 PROTHROMBIN TIME: CPT

## 2019-04-25 PROCEDURE — 82378 CARCINOEMBRYONIC ANTIGEN: CPT

## 2019-04-25 PROCEDURE — 71045 X-RAY EXAM CHEST 1 VIEW: CPT

## 2019-04-25 PROCEDURE — 92611 MOTION FLUOROSCOPY/SWALLOW: CPT

## 2019-04-25 PROCEDURE — 87086 URINE CULTURE/COLONY COUNT: CPT

## 2019-04-25 PROCEDURE — 86038 ANTINUCLEAR ANTIBODIES: CPT

## 2019-04-25 PROCEDURE — 82728 ASSAY OF FERRITIN: CPT

## 2019-04-25 PROCEDURE — 86900 BLOOD TYPING SEROLOGIC ABO: CPT

## 2019-04-25 PROCEDURE — 84295 ASSAY OF SERUM SODIUM: CPT

## 2019-04-25 PROCEDURE — 83880 ASSAY OF NATRIURETIC PEPTIDE: CPT

## 2019-04-25 PROCEDURE — 97116 GAIT TRAINING THERAPY: CPT

## 2019-04-25 PROCEDURE — 84155 ASSAY OF PROTEIN SERUM: CPT

## 2019-04-25 PROCEDURE — 76770 US EXAM ABDO BACK WALL COMP: CPT

## 2019-04-25 PROCEDURE — 80048 BASIC METABOLIC PNL TOTAL CA: CPT

## 2019-04-25 PROCEDURE — 83540 ASSAY OF IRON: CPT

## 2019-04-25 PROCEDURE — 83550 IRON BINDING TEST: CPT

## 2019-04-25 PROCEDURE — 92610 EVALUATE SWALLOWING FUNCTION: CPT

## 2019-04-25 PROCEDURE — 85014 HEMATOCRIT: CPT

## 2019-04-25 PROCEDURE — 87486 CHLMYD PNEUM DNA AMP PROBE: CPT

## 2019-04-25 PROCEDURE — 97110 THERAPEUTIC EXERCISES: CPT

## 2019-04-25 PROCEDURE — 84100 ASSAY OF PHOSPHORUS: CPT

## 2019-04-25 PROCEDURE — 86901 BLOOD TYPING SEROLOGIC RH(D): CPT

## 2019-04-25 PROCEDURE — 99285 EMERGENCY DEPT VISIT HI MDM: CPT | Mod: 25

## 2019-04-25 PROCEDURE — 86850 RBC ANTIBODY SCREEN: CPT

## 2019-04-25 PROCEDURE — 74176 CT ABD & PELVIS W/O CONTRAST: CPT

## 2019-04-25 PROCEDURE — 74230 X-RAY XM SWLNG FUNCJ C+: CPT

## 2019-04-25 PROCEDURE — 97530 THERAPEUTIC ACTIVITIES: CPT

## 2019-04-25 PROCEDURE — 84443 ASSAY THYROID STIM HORMONE: CPT

## 2019-04-25 PROCEDURE — 82803 BLOOD GASES ANY COMBINATION: CPT

## 2019-04-25 PROCEDURE — 93306 TTE W/DOPPLER COMPLETE: CPT

## 2019-04-25 RX ORDER — SENNA PLUS 8.6 MG/1
2 TABLET ORAL
Qty: 0 | Refills: 0 | COMMUNITY
Start: 2019-04-25

## 2019-04-25 RX ORDER — GLUCOSAMINE/CHONDROITIN/C/MANG 500-400 MG
1 CAPSULE ORAL
Qty: 0 | Refills: 0 | COMMUNITY

## 2019-04-25 RX ORDER — WARFARIN SODIUM 2.5 MG/1
1 TABLET ORAL
Qty: 0 | Refills: 0 | COMMUNITY

## 2019-04-25 RX ORDER — OXYCODONE HYDROCHLORIDE 5 MG/1
2.5 TABLET ORAL
Qty: 0 | Refills: 0 | COMMUNITY
Start: 2019-04-25

## 2019-04-25 RX ORDER — POLYETHYLENE GLYCOL 3350 17 G/17G
17 POWDER, FOR SOLUTION ORAL
Qty: 0 | Refills: 0 | COMMUNITY
Start: 2019-04-25

## 2019-04-25 RX ORDER — DOCUSATE SODIUM 100 MG
1 CAPSULE ORAL
Qty: 0 | Refills: 0 | COMMUNITY
Start: 2019-04-25

## 2019-04-25 RX ORDER — AMLODIPINE BESYLATE 2.5 MG/1
1 TABLET ORAL
Qty: 0 | Refills: 0 | COMMUNITY
Start: 2019-04-25

## 2019-04-25 RX ORDER — FUROSEMIDE 40 MG
1 TABLET ORAL
Qty: 0 | Refills: 0 | COMMUNITY

## 2019-04-25 RX ORDER — AMLODIPINE BESYLATE 2.5 MG/1
1 TABLET ORAL
Qty: 0 | Refills: 0 | COMMUNITY

## 2019-04-25 RX ORDER — LANOLIN ALCOHOL/MO/W.PET/CERES
1 CREAM (GRAM) TOPICAL
Qty: 0 | Refills: 0 | COMMUNITY
Start: 2019-04-25

## 2019-04-25 RX ADMIN — Medication 50 MICROGRAM(S): at 05:28

## 2019-04-25 RX ADMIN — Medication 0.5 MILLIGRAM(S): at 05:28

## 2019-04-25 RX ADMIN — Medication 100 MILLIGRAM(S): at 05:28

## 2019-04-25 RX ADMIN — Medication 200 MILLIGRAM(S): at 11:41

## 2019-04-25 RX ADMIN — Medication 650 MILLIGRAM(S): at 11:31

## 2019-04-25 RX ADMIN — Medication 3 MILLILITER(S): at 10:34

## 2019-04-25 RX ADMIN — AMLODIPINE BESYLATE 10 MILLIGRAM(S): 2.5 TABLET ORAL at 05:28

## 2019-04-25 RX ADMIN — Medication 3 MILLILITER(S): at 05:28

## 2019-04-25 RX ADMIN — Medication 1 TABLET(S): at 11:32

## 2019-04-25 RX ADMIN — PANTOPRAZOLE SODIUM 40 MILLIGRAM(S): 20 TABLET, DELAYED RELEASE ORAL at 05:28

## 2019-04-25 RX ADMIN — Medication 20 MILLIGRAM(S): at 05:28

## 2019-04-25 NOTE — CHART NOTE - NSCHARTNOTEFT_GEN_A_CORE
Hemoglobin stable. Discussed with Dr. Vera, patient medically cleared from discharge to rehab. Case discussed with Dr. soriano this am, continue to hold lasix and No need for coumadin as per hematology documentation. Continue all current medications. Continue to monitor R hip and hemoglobin in rehab. Follow up with Dr. Goldberg after release from rehab. Discharge to rehab today.

## 2019-04-25 NOTE — PROGRESS NOTE ADULT - REASON FOR ADMISSION

## 2019-04-25 NOTE — PROGRESS NOTE ADULT - PROBLEM SELECTOR PROBLEM 5
R/O Malignant neoplasm of female breast, unspecified estrogen receptor status, unspecified laterality, unspecified site of breast
R/O Malignant neoplasm of female breast, unspecified estrogen receptor status, unspecified laterality, unspecified site of breast
Microcytic anemia
Microcytic anemia
Rheumatoid arthritis involving both hands, unspecified rheumatoid factor presence
Microcytic anemia
R/O CKD (chronic kidney disease) stage 5, GFR less than 15 ml/min
R/O Malignant neoplasm of female breast, unspecified estrogen receptor status, unspecified laterality, unspecified site of breast
Rheumatoid arthritis involving both hands, unspecified rheumatoid factor presence

## 2019-04-25 NOTE — PROGRESS NOTE ADULT - PROBLEM SELECTOR PLAN 5
Breast nodule on CT scan  onc fu noted  outpt comprehensive breast imaging  cThead and cta/p reviewed no acute findings, no metastatic dz
Breast nodule on CT scan  onc fu noted  outpt comprehensive breast imaging  cThead and cta/p reviewed no acute findings, no metastatic dz
Pt found to have microcytic anemia. Anemia may in part be related to chronic kidney disease.  Check reticulocyte count, ferritin, iron studies.
Pt found to have microcytic anemia. Anemia may in part be related to chronic kidney disease.  Check reticulocyte count, ferritin, iron studies.
As per primary team
Breast nodule on CT scan  onc fu noted  outpt comprehensive breast imaging  cThead and cta/p reviewed no acute findings, no metastatic dz
Pt found to have eGFR of 11.   renal cs fu  Check U pro: creatinine ratio, UA, strict I&Os, bladder and renal ultrasound   Avoid nephrotoxic agents  Check PTH, vitamin D level  Check lipid panel, TSH
Pt found to have microcytic anemia. Anemia may in part be related to chronic kidney disease.  Check reticulocyte count, ferritin, iron studies.
home dose of plaquenil.  morphine 5mg 4x day PRN for severe pain.

## 2019-04-25 NOTE — PROGRESS NOTE ADULT - PROBLEM SELECTOR PROBLEM 2
R/O Malignant neoplasm of female breast, unspecified estrogen receptor status, unspecified laterality, unspecified site of breast
DAVE (acute kidney injury)
Chronic kidney disease, unspecified CKD stage
DAVE (acute kidney injury)
R/O Aspiration pneumonia of both lungs, unspecified aspiration pneumonia type, unspecified part of lung
R/O Malignant neoplasm of female breast, unspecified estrogen receptor status, unspecified laterality, unspecified site of breast
Chronic kidney disease, unspecified CKD stage
R/O Malignant neoplasm of female breast, unspecified estrogen receptor status, unspecified laterality, unspecified site of breast
R/O Aspiration pneumonia of both lungs, unspecified aspiration pneumonia type, unspecified part of lung
R/O Aspiration pneumonia of both lungs, unspecified aspiration pneumonia type, unspecified part of lung

## 2019-04-25 NOTE — CHART NOTE - NSCHARTNOTEFT_GEN_A_CORE
Pt seen for nutrition follow up on 6TOW.    Verbal nutrition consult per RN, pt daughter requesting more information regarding nutrition/diet recommendations prior to pt discharge. Pt seen for nutrition follow up on 6TOW.    Verbal nutrition consult per RN, pt daughter requesting more information regarding nutrition/diet recommendations prior to pt discharge. Reviewed the following with pt/pt daughter/pt care taker in attendance: low sodium recommendations, salt free substitutes, adequate hydration while receiving thickened fluids, purchasing thickener for fluids, cutting up foods well and preparing them with adequate moisture/sauces as needed, avoiding difficult to chew foods, monitoring for high potassium/phosphorus foods, and monitoring added sugar intake and fruit juice intake in setting of elevated BG (pt taking steroids) and DAVE on CKD. Written materials provided. Daughter able to teach back points, consult appreciated. RD remains available.    Susana Iglesias RD, CDN Pager: [phone]

## 2019-04-25 NOTE — PROGRESS NOTE ADULT - PROBLEM SELECTOR PROBLEM 1
Dyspnea
Dyspnea, unspecified type
Dyspnea
Dyspnea, unspecified type
Pleural effusion, right
R/O Hematoma of abdominal wall, initial encounter
Dyspnea, unspecified type
Pleural effusion, right
R/O Hematoma of abdominal wall, initial encounter
R/O Hematoma of abdominal wall, initial encounter
Pleural effusion, right

## 2019-04-25 NOTE — PROGRESS NOTE ADULT - PROBLEM SELECTOR PLAN 2
Breast nodule on CT scan  onc fu noted  outpt comprehensive breast imaging  however will fu thoracentesis cytopath if performed.
Creatinine about the same  Continue dose adjustments of medications as able
-  -Nephrology recs appreciated.
Breast nodule on CT scan  onc fu noted  outpt comprehensive breast imaging  cThead and cta/p
Creatinine about the same  Continue dose adjustments of medications as able
Creatinine remains about the same  Continue dose adjustments of medications as able
Creatinine remains about the same  Continue dose adjustments of medications as able
ground glass opacities on Ct chest  cont ertepenem d4/7  speech and swallow eval  MBS pending
ground glass opacities on Ct chest  cont ertepenem d5/7  speech and swallow eval  MBS pending
ground glass opacities on Ct chest  ertepenem -completed course  speech and swallow eval- cont dysphagia diet
-  -Nephrology recs appreciated.
Breast nodule on CT scan  onc fu noted  outpt comprehensive breast imaging  cThead and cta/p reviewed no acute findings, no metastatic dz
ground glass opacities on Ct chest  ertepenem -completed course  speech and swallow eval- cont dysphagia diet
ground glass opacities on Ct chest  ertepenem -completed course  speech and swallow eval- cont dysphagia diet

## 2019-04-25 NOTE — DISCHARGE NOTE NURSING/CASE MANAGEMENT/SOCIAL WORK - NSDCDPATPORTLINK_GEN_ALL_CORE
You can access the [facility] Patient Portal, offered by [facility], by registering with the following website: [email or web address]

## 2019-04-25 NOTE — PROGRESS NOTE ADULT - PROBLEM SELECTOR PLAN 9
protonix for known hx of GERD.
protonix for known hx of GERD.
VTE ppx: pt on heparin gtt  will resume coumadin as no plan for thoracentesis or any other immediate procedures
protonix for known hx of GERD.

## 2019-04-25 NOTE — PROGRESS NOTE ADULT - PROBLEM SELECTOR PLAN 3
-  -no intervention for now. medical management.  -Severe MS noted on tte. mitral annular calcification also noted. likely poor candidate for valvuloplasty  -Normal LVEF  -Diuresis and rate control once creatinine improves.     Renato Maher D.O.  [phone]
Pt found to have eGFR of 11.   Consult nephrology called  Check U pro: creatinine ratio, UA, strict I&Os, bladder and renal ultrasound   Avoid nephrotoxic agents  Check PTH, vitamin D level  Check lipid panel, TSH
Was on steroids PTA  Baseline?  Monitor, though now back on steroids which may make interpretation difficult
-  -no intervention for now. medical management.  -Severe MS noted on tte. mitral annular calcification also noted. likely poor candidate for valvuloplasty  -Normal LVEF  -Diuresis and rate control.   -Toprol  mg daily.     Renato Maher D.O.  [phone]
-  -no intervention for now. medical management.  -Severe MS noted on tte. mitral annular calcification also noted. likely poor candidate for valvuloplasty  -Normal LVEF  -Diuresis and rate control once creatinine improves.   -Start toprol XL 50 mg daily. should help with rate and BP control.    Renato Maher D.O.  [phone]
-  -no intervention for now. medical management.  -Severe MS noted on tte. mitral annular calcification also noted. likely poor candidate for valvuloplasty  -Normal LVEF  -Diuresis and rate control once creatinine improves.   -Start toprol XL 50 mg daily. should help with rate and BP control. May titrate up to 75mg for better rate control if not <90 at rest.  Monitor for Vicodin withdrawal
-  -no intervention for now. medical management.  -Severe MS noted on tte. mitral annular calcification also noted. likely poor candidate for valvuloplasty  -Normal LVEF  -Diuresis and rate control once creatinine improves.   -Toprol  mg daily. should help with rate and BP control.    Renato Maher D.O.  [phone]
-  -no intervention for now. medical management.  -Severe MS noted on tte. mitral annular calcification also noted. likely poor candidate for valvuloplasty  -Normal LVEF  -Diuresis and rate control once creatinine improves.   -Toprol  mg daily. should help with rate and BP control.    Renato Maher D.O.  [phone]
-  -no intervention for now. medical management.  -Severe MS noted on tte. mitral annular calcification also noted. likely poor candidate for valvuloplasty  -Normal LVEF  -Diuresis and rate control.   -Toprol  mg daily.       Coumadin stopped for drop in hemoglobin.    Patient can follow up with Dr. Goldberg, Steven ([facility])    Renato Maher D.O.  [phone]
-  -no intervention for now. medical management.  -Severe MS noted on tte. mitral annular calcification also noted. likely poor candidate for valvuloplasty  -Normal LVEF  -Diuresis and rate control.   -Toprol  mg daily.     Renato Maher D.O.  [phone]
Breast nodule on CT scan  onc fu noted  outpt comprehensive breast imaging  cThead and cta/p reviewed no acute findings, no metastatic dz
Pt found to have eGFR of 11.   renal cs fu  Check U pro: creatinine ratio, UA, strict I&Os, bladder and renal ultrasound   Avoid nephrotoxic agents  Check PTH, vitamin D level  Check lipid panel, TSH
Was on steroids PTA  Baseline?  Monitor, though now back on steroids which may make interpretation difficult
Was on steroids PTA  Monitor, though now back on steroids which may make interpretation difficult
Was on steroids PTA  Monitor, though now back on steroids which may make interpretation difficult  Baseline also unclear, chronic component?
Was on steroids PTA  There are elevated values in the computer going back to 2014, though they are devoid of clinical context.  Baseline?  Monitor
Was on steroids PTA  There are elevated values in the computer going back to 2014, though they are devoid of clinical context.  Baseline?  Monitor, though now back on steroids which may make interpretation difficult
likely steroid induced  monitor for now
Pt found to have eGFR of 11.   renal cs fu  Check U pro: creatinine ratio, UA, strict I&Os, bladder and renal ultrasound   Avoid nephrotoxic agents  Check PTH, vitamin D level  Check lipid panel, TSH
likely steroid induced  monitor for now
likely steroid induced  monitor for now

## 2019-04-25 NOTE — PROGRESS NOTE ADULT - PROBLEM SELECTOR PLAN 8
cont home dose of synthroid.
cont home dose of synthroid.
VTE ppx: pt on heparin gtt  will resume coumadin as no plan for thoracentesis or any other immediate procedures
VTE ppx: pt on coumadin-being held for possible thoracentesis as outpt
Start home dose of synthroid. Check TSH
VTE ppx: pt on heparin gtt  will resume coumadin as no plan for theocentesis or any other immediate procedures
cont home dose of synthroid.
protonix for known hx of GERD.

## 2019-04-25 NOTE — PROGRESS NOTE ADULT - PROBLEM SELECTOR PROBLEM 8
Hypothyroidism
Hypothyroidism
Prophylactic measure
Prophylactic measure
GERD (gastroesophageal reflux disease)
Hypothyroidism
Prophylactic measure

## 2019-04-25 NOTE — PROGRESS NOTE ADULT - PROBLEM SELECTOR PLAN 6
Cr trending up- monitor  renal cs fu  Avoid nephrotoxic agents
Cr trending up- monitor  renal cs fu  Avoid nephrotoxic agents
Start home dose of synthroid. Check TSH
Start home dose of synthroid. Check TSH
Cr trending up- monitor  renal cs fu  Avoid nephrotoxic agents
Pt found to have eGFR of 11.   renal cs fu  Check U pro: creatinine ratio, UA, strict I&Os, bladder and renal ultrasound   Avoid nephrotoxic agents  Check PTH, vitamin D level  Check lipid panel, TSH
Pt found to have eGFR of 11.   renal cs fu  Check U pro: creatinine ratio, UA, strict I&Os, bladder and renal ultrasound   Avoid nephrotoxic agents  Check PTH, vitamin D level  Check lipid panel, TSH
Pt found to have microcytic anemia. Anemia may in part be related to chronic kidney disease.  Check reticulocyte count, ferritin, iron studies.
Start home dose of synthroid. Check TSH
home dose of plaquenil.  morphine 5mg 4x day PRN for severe pain.

## 2019-04-25 NOTE — PROGRESS NOTE ADULT - PROBLEM SELECTOR PROBLEM 9
GERD (gastroesophageal reflux disease)
Prophylactic measure

## 2019-04-25 NOTE — PROGRESS NOTE ADULT - PROBLEM SELECTOR PROBLEM 3
R/O CKD (chronic kidney disease) stage 5, GFR less than 15 ml/min
Leukocytosis
Mitral valve stenosis, unspecified etiology
Leukocytosis
Mitral valve stenosis, unspecified etiology
R/O CKD (chronic kidney disease) stage 5, GFR less than 15 ml/min
R/O Leukocytosis, unspecified type
R/O Malignant neoplasm of female breast, unspecified estrogen receptor status, unspecified laterality, unspecified site of breast
Mitral valve stenosis, unspecified etiology
R/O CKD (chronic kidney disease) stage 5, GFR less than 15 ml/min
R/O Leukocytosis, unspecified type
R/O Leukocytosis, unspecified type

## 2019-04-25 NOTE — PROGRESS NOTE ADULT - PROBLEM SELECTOR PROBLEM 7
Rheumatoid arthritis involving both hands, unspecified rheumatoid factor presence
Rheumatoid arthritis involving both hands, unspecified rheumatoid factor presence
GERD (gastroesophageal reflux disease)
Hypothyroidism
Microcytic anemia
Rheumatoid arthritis involving both hands, unspecified rheumatoid factor presence

## 2019-04-25 NOTE — PROGRESS NOTE ADULT - ASSESSMENT
Moderate bronchospasm, PCR negative, now responding to po prednisone: r/o asthma  Breast CA with ? new L breast lesion  Severe Mitral stensosis  OP dysphagia: on dysphagia 3 diet  Probable L basilar asp pneumonia: clinically respond to abx  RA  Dysarthria  CKD    REC    Complete prednisone taper   Continue bronchodiltors  Aspiration precautions  Check RA sats  DC planning primary team

## 2019-04-25 NOTE — PROGRESS NOTE ADULT - SUBJECTIVE AND OBJECTIVE BOX
[facility]   [phone]  RENAL FOLLOW UP NOTE  --------------------------------------------------------------------------------  Chief Complaint:    24 hour events/subjective:    states breathing has improved and feels better.     PAST HISTORY  --------------------------------------------------------------------------------  No significant changes to PMH, PSH, FHx, SHx, unless otherwise noted    ALLERGIES & MEDICATIONS  --------------------------------------------------------------------------------  Allergies    Allegra (Unknown)  amoxicillin (Hives)  Cipro (Hives)  shellfish (Rash)    Intolerances      Standing Inpatient Medications  ALBUTerol/ipratropium for Nebulization 3 milliLiter(s) Nebulizer every 4 hours  amLODIPine   Tablet 10 milliGRAM(s) Oral daily  buDESOnide   0.5 milliGRAM(s) Respule 0.5 milliGRAM(s) Inhalation two times a day  calcium carbonate 1250 mG  + Vitamin D (OsCal 500 + D) 1 Tablet(s) Oral daily  docusate sodium 100 milliGRAM(s) Oral three times a day  epoetin zaina Injectable 17781 Unit(s) SubCutaneous <User Schedule>  ergocalciferol 04172 Unit(s) Oral every week  hydroxychloroquine 200 milliGRAM(s) Oral daily  levothyroxine 50 MICROGram(s) Oral daily  metoprolol succinate  milliGRAM(s) Oral daily  pantoprazole    Tablet 40 milliGRAM(s) Oral before breakfast  predniSONE   Tablet 20 milliGRAM(s) Oral daily  predniSONE   Tablet   Oral   senna 2 Tablet(s) Oral at bedtime    PRN Inpatient Medications  acetaminophen   Tablet .. 650 milliGRAM(s) Oral every 6 hours PRN  guaiFENesin   Syrup  (Sugar-Free) 100 milliGRAM(s) Oral every 6 hours PRN  melatonin 3 milliGRAM(s) Oral at bedtime PRN  oxyCODONE    IR 2.5 milliGRAM(s) Oral four times a day PRN  polyethylene glycol 3350 17 Gram(s) Oral daily PRN      REVIEW OF SYSTEMS  --------------------------------------------------------------------------------    Gen: denies  fevers/chills,  CVS: denies chest pain/palpitations  Resp: denies SOB/Cough  GI: Denies N/V/Abd pain  : Denies dysuria/oliguria/hematuria    All other systems were reviewed and are negative, except as noted.    VITALS/PHYSICAL EXAM  --------------------------------------------------------------------------------  T(C): 36.4 (04-24-19 @ 20:07), Max: 37 (04-24-19 @ 04:34)  HR: 79 (04-24-19 @ 20:07) (69 - 81)  BP: 137/89 (04-24-19 @ 20:07) (137/73 - 148/72)  RR: 18 (04-24-19 @ 20:07) (18 - 19)  SpO2: 95% (04-24-19 @ 20:07) (94% - 98%)  Wt(kg): --        04-23-19 @ 07:01  -  04-24-19 @ 07:00  --------------------------------------------------------  IN: 720 mL / OUT: 1750 mL / NET: -1030 mL    04-24-19 @ 07:01  -  04-24-19 @ 22:37  --------------------------------------------------------  IN: 600 mL / OUT: 700 mL / NET: -100 mL      Physical Exam:  	  Gen: alert oriented    	Pulm: Decreased breath sounds b/l bases. no rales or ronchi or wheezing  	CV: RRR, S1/S2. no rub  	Back: right flank hematoma   	Abd: +BS, soft, nontender/nondistended  	: No suprapubic tenderness.               Extremity: No cyanosis, no edema no clubbing	    LABS/STUDIES  --------------------------------------------------------------------------------              8.0    23.0  >-----------<  287      [04-24-19 @ 05:55]              24.4     137  |  103  |  105  ----------------------------<  263      [04-24-19 @ 05:55]  4.9   |  18  |  4.12        Ca     9.3     [04-24-19 @ 05:55]        PTT: 28.8       [04-23-19 @ 09:03]      Creatinine Trend:  SCr 4.12 [04-24 @ 05:55]  SCr 4.28 [04-23 @ 05:48]  SCr 3.99 [04-22 @ 02:22]  SCr 3.91 [04-21 @ 06:08]  SCr 3.87 [04-20 @ 04:50]              Urinalysis - [04-17-19 @ 22:19]      Color  / Appearance  / SG  / pH       Gluc  / Ketone   / Bili  / Urobili        Blood  / Protein  / Leuk Est  / Nitrite       RBC 7 / WBC 3 / Hyaline 1 / Gran  / Sq Epi  / Non Sq Epi 2 / Bacteria Negative    Urine Protein 424      [04-18-19 @ 03:29]    Iron 14, TIBC 289, %sat 5      [04-14-19 @ 09:47]  Ferritin 34      [04-14-19 @ 10:14]  PTH -- (Ca 9.3)      [04-14-19 @ 10:14]   226  Vitamin D (25OH) 37.6      [04-14-19 @ 09:47]  HbA1c 7.0      [04-23-19 @ 09:27]  TSH 0.68      [04-13-19 @ 22:30]  Lipid: chol 157, TG 66, HDL 55, LDL 89      [04-14-19 @ 09:47]    LEWIS: titer Negative, pattern --      [04-18-19 @ 00:33]  C3 Complement 110      [04-17-19 @ 23:56]  C4 Complement 35      [04-17-19 @ 23:56]  ANCA: cANCA Negative, pANCA Negative, atypical ANCA Indeterminate Method interference due to LEWIS fluorescence      [04-18-19 @ 00:33]  Immunofixation Serum:   Weak IgM Lambda Band Identified and a weak  IgG Kappa Band Identified    Reference Range: None Detected      [04-17-19 @ 23:56]  SPEP Interpretation: Two weak Gamma-Migrating Paraproteins Identified      [04-17-19 @ 23:56]
[facility] [place] Cardiology Progress Note  _______________________________    Pt. seen and examined. No new cardiac-related complaints.    Telemetry -sinus 60-80s    T(C): 36.6 (04-22-19 @ 08:19), Max: 36.8 (04-21-19 @ 11:52)  HR: 66 (04-22-19 @ 08:19) (66 - 75)  BP: 130/70 (04-22-19 @ 08:19) (124/69 - 149/70)  RR: 18 (04-22-19 @ 08:19) (18 - 19)  SpO2: 97% (04-22-19 @ 08:19) (91% - 98%)  I&O's Summary    21 Apr 2019 07:01  -  22 Apr 2019 07:00  --------------------------------------------------------  IN: 524 mL / OUT: 1100 mL / NET: -576 mL        PHYSICAL EXAM:  GENERAL: Alert, NAD.  NECK: Supple  CHEST/LUNG: Clear to auscultation bilaterally; No wheezes, rales, or rhonchi.  HEART: S1 S2 normal, RRR; No murmurs, rubs, or gallops  ABDOMEN: Soft, Nondistended  EXTREMITIES:  No LE edema.      LABS:                        8.1    24.0  )-----------( 320      ( 22 Apr 2019 02:22 )             25.8     04-22    139  |  104  |  97<H>  ----------------------------<  238<H>  4.8   |  20<L>  |  3.99<H>    Ca    9.7      22 Apr 2019 02:22  Mg     2.3     04-22      PT/INR - ( 22 Apr 2019 02:22 )   PT: 21.5 sec;   INR: 1.85 ratio         PTT - ( 22 Apr 2019 02:22 )  PTT:28.9 sec              MEDICATIONS  (STANDING):  ALBUTerol/ipratropium for Nebulization 3 milliLiter(s) Nebulizer every 4 hours  amLODIPine   Tablet 10 milliGRAM(s) Oral daily  buDESOnide   0.5 milliGRAM(s) Respule 0.5 milliGRAM(s) Inhalation two times a day  calcium carbonate 1250 mG  + Vitamin D (OsCal 500 + D) 1 Tablet(s) Oral daily  docusate sodium 100 milliGRAM(s) Oral three times a day  epoetin zaina Injectable 77000 Unit(s) SubCutaneous <User Schedule>  ergocalciferol 72518 Unit(s) Oral every week  ertapenem  IVPB 500 milliGRAM(s) IV Intermittent every 24 hours  furosemide    Tablet 40 milliGRAM(s) Oral daily  hydroxychloroquine 200 milliGRAM(s) Oral daily  levothyroxine 50 MICROGram(s) Oral daily  metoprolol succinate  milliGRAM(s) Oral daily  pantoprazole    Tablet 40 milliGRAM(s) Oral before breakfast  predniSONE   Tablet 30 milliGRAM(s) Oral daily  predniSONE   Tablet   Oral   senna 2 Tablet(s) Oral at bedtime    MEDICATIONS  (PRN):  acetaminophen   Tablet .. 650 milliGRAM(s) Oral every 6 hours PRN Mild Pain (1 - 3)  guaiFENesin   Syrup  (Sugar-Free) 100 milliGRAM(s) Oral every 6 hours PRN Cough  melatonin 3 milliGRAM(s) Oral at bedtime PRN Insomnia  oxyCODONE    IR 2.5 milliGRAM(s) Oral four times a day PRN Severe Pain (7 - 10)  polyethylene glycol 3350 17 Gram(s) Oral daily PRN Constipation        RADIOLOGY & ADDITIONAL TESTS:
[facility]   [phone]  RENAL FOLLOW UP NOTE  --------------------------------------------------------------------------------  Chief Complaint:    24 hour events/subjective:    seen earlier. states breathing is better. c/o bruise right flank area     PAST HISTORY  --------------------------------------------------------------------------------  No significant changes to PMH, PSH, FHx, SHx, unless otherwise noted    ALLERGIES & MEDICATIONS  --------------------------------------------------------------------------------  Allergies    Allegra (Unknown)  amoxicillin (Hives)  Cipro (Hives)  shellfish (Rash)    Intolerances      Standing Inpatient Medications  ALBUTerol/ipratropium for Nebulization 3 milliLiter(s) Nebulizer every 4 hours  amLODIPine   Tablet 10 milliGRAM(s) Oral daily  buDESOnide   0.5 milliGRAM(s) Respule 0.5 milliGRAM(s) Inhalation two times a day  calcium carbonate 1250 mG  + Vitamin D (OsCal 500 + D) 1 Tablet(s) Oral daily  docusate sodium 100 milliGRAM(s) Oral three times a day  epoetin zaina Injectable 16111 Unit(s) SubCutaneous <User Schedule>  ergocalciferol 52448 Unit(s) Oral every week  ertapenem  IVPB 500 milliGRAM(s) IV Intermittent every 24 hours  furosemide    Tablet 40 milliGRAM(s) Oral daily  hydroxychloroquine 200 milliGRAM(s) Oral daily  levothyroxine 50 MICROGram(s) Oral daily  metoprolol succinate  milliGRAM(s) Oral daily  pantoprazole    Tablet 40 milliGRAM(s) Oral before breakfast  predniSONE   Tablet 30 milliGRAM(s) Oral daily  predniSONE   Tablet   Oral   senna 2 Tablet(s) Oral at bedtime    PRN Inpatient Medications  guaiFENesin   Syrup  (Sugar-Free) 100 milliGRAM(s) Oral every 6 hours PRN  melatonin 3 milliGRAM(s) Oral at bedtime PRN  oxyCODONE    IR 2.5 milliGRAM(s) Oral four times a day PRN  polyethylene glycol 3350 17 Gram(s) Oral daily PRN      REVIEW OF SYSTEMS  --------------------------------------------------------------------------------    Gen: denies  fevers/chills,  CVS: denies chest pain/palpitations  Resp: denies SOB/Cough  GI: Denies N/V/Abd pain  : Denies dysuria/oliguria/hematuria    All other systems were reviewed and are negative, except as noted.    VITALS/PHYSICAL EXAM  --------------------------------------------------------------------------------  T(C): 36.8 (04-21-19 @ 11:52), Max: 36.9 (04-21-19 @ 04:21)  HR: 74 (04-21-19 @ 11:52) (67 - 76)  BP: 149/70 (04-21-19 @ 11:52) (149/70 - 169/69)  RR: 18 (04-21-19 @ 11:52) (18 - 19)  SpO2: 95% (04-21-19 @ 11:52) (92% - 97%)  Wt(kg): --        04-20-19 @ 07:01  -  04-21-19 @ 07:00  --------------------------------------------------------  IN: 220 mL / OUT: 1920 mL / NET: -1700 mL    04-21-19 @ 07:01  -  04-21-19 @ 13:45  --------------------------------------------------------  IN: 244 mL / OUT: 0 mL / NET: 244 mL      Physical Exam:  	  	Gen: alert oriented  more comfortable appearing   	Pulm: Decreased breath sounds b/l bases. no rales or ronchi or wheezing  	CV: RRR, S1/S2. no rub  	Back: right flank hematoma   	Abd: +BS, soft, nontender/nondistended  	: No suprapubic tenderness.               Extremity: No cyanosis, no edema no clubbing  	      LABS/STUDIES  --------------------------------------------------------------------------------              9.0    26.8  >-----------<  345      [04-21-19 @ 09:48]              28.4     142  |  107  |  82  ----------------------------<  243      [04-21-19 @ 06:08]  5.3   |  20  |  3.91        Ca     10.3     [04-21-19 @ 06:08]      PT/INR: PT 17.6 , INR 1.51       [04-21-19 @ 09:28]  PTT: 93.3       [04-21-19 @ 00:12]      Creatinine Trend:  SCr 3.91 [04-21 @ 06:08]  SCr 3.87 [04-20 @ 04:50]  SCr 3.91 [04-19 @ 05:53]  SCr 3.71 [04-18 @ 05:37]  SCr 3.75 [04-17 @ 06:47]              Urinalysis - [04-17-19 @ 22:19]      Color  / Appearance  / SG  / pH       Gluc  / Ketone   / Bili  / Urobili        Blood  / Protein  / Leuk Est  / Nitrite       RBC 7 / WBC 3 / Hyaline 1 / Gran  / Sq Epi  / Non Sq Epi 2 / Bacteria Negative    Urine Creatinine 75      [04-17-19 @ 22:12]  Urine Protein 424      [04-18-19 @ 03:29]    Iron 14, TIBC 289, %sat 5      [04-14-19 @ 09:47]  Ferritin 34      [04-14-19 @ 10:14]  PTH -- (Ca 9.3)      [04-14-19 @ 10:14]   226  Vitamin D (25OH) 37.6      [04-14-19 @ 09:47]  TSH 0.68      [04-13-19 @ 22:30]  Lipid: chol 157, TG 66, HDL 55, LDL 89      [04-14-19 @ 09:47]    LEWIS: titer Negative, pattern --      [04-18-19 @ 00:33]  C3 Complement 110      [04-17-19 @ 23:56]  C4 Complement 35      [04-17-19 @ 23:56]  ANCA: cANCA Negative, pANCA Negative, atypical ANCA Indeterminate Method interference due to LEWIS fluorescence      [04-18-19 @ 00:33]  Immunofixation Serum:   Weak IgM Lambda Band Identified and a weak  IgG Kappa Band Identified    Reference Range: None Detected      [04-17-19 @ 23:56]  SPEP Interpretation: Two weak Gamma-Migrating Paraproteins Identified      [04-17-19 @ 23:56]
[facility] Cardiology Progress Note  _______________________________    Pt. seen and examined. No new cardiac-related complaints.    Telemetry -sinus     T(C): 36.6 (19 @ 04:32), Max: 36.7 (19 @ 00:12)  HR: 90 (19 @ 04:32) (86 - 95)  BP: 147/73 (19 @ 05:36) (133/61 - 180/82)  RR: 20 (19 @ 05:36) (20 - 22)  SpO2: 95% (19 @ 05:36) (93% - 96%)  I&O's Summary    2019 07:01  -  2019 07:00  --------------------------------------------------------  IN: 1371 mL / OUT: 800 mL / NET: 571 mL        PHYSICAL EXAM:  GENERAL: Alert, NAD.  NECK: Supple  CHEST/LUNG: Clear to auscultation bilaterally; No wheezes, rales, or rhonchi.  HEART: S1 S2 normal, RRR; No murmurs, rubs, or gallops  ABDOMEN: Soft, Nondistended  EXTREMITIES:  No LE edema.      LABS:                        8.2    13.08 )-----------( 340      ( 2019 09:34 )             27.3         141  |  107  |  50<H>  ----------------------------<  170<H>  4.2   |  21<L>  |  3.75<H>    Ca    10.1      2019 06:47      PT/INR - ( 2019 08:42 )   PT: 16.4 sec;   INR: 1.44 ratio         PTT - ( 2019 08:42 )  PTT:75.2 sec      Serum Pro-Brain Natriuretic Peptide: 97201 pg/mL (19 @ 06:47)      Urinalysis Basic - ( 15 Apr 2019 16:48 )    Color: Light Yellow / Appearance: Clear / S.008 / pH: x  Gluc: x / Ketone: Negative  / Bili: Negative / Urobili: <2 mg/dL   Blood: x / Protein: 100 mg/dL / Nitrite: Negative   Leuk Esterase: Negative / RBC: 1 /HPF / WBC 1 /HPF   Sq Epi: x / Non Sq Epi: 1 /HPF / Bacteria: TNTC        MEDICATIONS  (STANDING):  ALBUTerol/ipratropium for Nebulization 3 milliLiter(s) Nebulizer every 4 hours  amLODIPine   Tablet 10 milliGRAM(s) Oral daily  buDESOnide   0.5 milliGRAM(s) Respule 0.5 milliGRAM(s) Inhalation two times a day  calcium carbonate 1250 mG  + Vitamin D (OsCal 500 + D) 1 Tablet(s) Oral daily  ergocalciferol 17173 Unit(s) Oral every week  ertapenem  IVPB 500 milliGRAM(s) IV Intermittent every 24 hours  heparin  Infusion.  Unit(s)/Hr (17 mL/Hr) IV Continuous <Continuous>  hydroxychloroquine 200 milliGRAM(s) Oral daily  levothyroxine 50 MICROGram(s) Oral daily  pantoprazole    Tablet 40 milliGRAM(s) Oral before breakfast  predniSONE   Tablet 30 milliGRAM(s) Oral two times a day    MEDICATIONS  (PRN):  guaiFENesin   Syrup  (Sugar-Free) 100 milliGRAM(s) Oral every 6 hours PRN Cough  heparin  Injectable 7500 Unit(s) IV Push every 6 hours PRN For aPTT less than 40  heparin  Injectable 3500 Unit(s) IV Push every 6 hours PRN For aPTT between 40 - 57  melatonin 3 milliGRAM(s) Oral at bedtime PRN Insomnia  oxyCODONE    IR 2.5 milliGRAM(s) Oral four times a day PRN Severe Pain (7 - 10)        RADIOLOGY & ADDITIONAL TESTS:
[facility]   [phone]  RENAL FOLLOW UP NOTE  --------------------------------------------------------------------------------  Chief Complaint:    24 hour events/subjective:    seen earlier. states breathing is improving and does not have wheezing     PAST HISTORY  --------------------------------------------------------------------------------  No significant changes to PMH, PSH, FHx, SHx, unless otherwise noted    ALLERGIES & MEDICATIONS  --------------------------------------------------------------------------------  Allergies    Allegra (Unknown)  amoxicillin (Hives)  Cipro (Hives)  shellfish (Rash)    Intolerances      Standing Inpatient Medications  ALBUTerol/ipratropium for Nebulization 3 milliLiter(s) Nebulizer every 4 hours  amLODIPine   Tablet 5 milliGRAM(s) Oral daily  buDESOnide   0.5 milliGRAM(s) Respule 0.5 milliGRAM(s) Inhalation two times a day  calcium carbonate 1250 mG  + Vitamin D (OsCal 500 + D) 1 Tablet(s) Oral daily  ergocalciferol 25884 Unit(s) Oral every week  ertapenem  IVPB 500 milliGRAM(s) IV Intermittent every 24 hours  heparin  Infusion.  Unit(s)/Hr IV Continuous <Continuous>  hydroxychloroquine 200 milliGRAM(s) Oral daily  levothyroxine 50 MICROGram(s) Oral daily  pantoprazole    Tablet 40 milliGRAM(s) Oral before breakfast  predniSONE   Tablet 30 milliGRAM(s) Oral two times a day  warfarin 3 milliGRAM(s) Oral once    PRN Inpatient Medications  guaiFENesin   Syrup  (Sugar-Free) 100 milliGRAM(s) Oral every 6 hours PRN  heparin  Injectable 7500 Unit(s) IV Push every 6 hours PRN  heparin  Injectable 3500 Unit(s) IV Push every 6 hours PRN  oxyCODONE    IR 2.5 milliGRAM(s) Oral four times a day PRN  zolpidem 5 milliGRAM(s) Oral at bedtime PRN      REVIEW OF SYSTEMS  --------------------------------------------------------------------------------    Gen: denies fevers/chills,  CVS: denies chest pain/palpitations  Resp: denies worsening SOB/Cough +   GI: Denies N/V/Abd pain  : Denies dysuria/oliguria/hematuria    All other systems were reviewed and are negative, except as noted.    VITALS/PHYSICAL EXAM  --------------------------------------------------------------------------------  T(C): 36.6 (04-16-19 @ 04:23), Max: 36.9 (04-15-19 @ 22:32)  HR: 95 (04-16-19 @ 12:13) (90 - 100)  BP: 169/74 (04-16-19 @ 12:13) (150/60 - 169/74)  RR: 18 (04-16-19 @ 04:23) (18 - 18)  SpO2: 96% (04-16-19 @ 12:13) (93% - 96%)  Wt(kg): --        04-15-19 @ 07:01  -  04-16-19 @ 07:00  --------------------------------------------------------  IN: 1416 mL / OUT: 1400 mL / NET: 16 mL    04-16-19 @ 07:01  -  04-16-19 @ 20:00  --------------------------------------------------------  IN: 470 mL / OUT: 300 mL / NET: 170 mL      Physical Exam:  	  Gen: Non toxic comfortable appearing   	no jvd   	Pulm: decrease bs  + but decrease  wheezing decrease ronchi 	  CV: RRR, S1S2; no rub  Abd: +BS, soft, nontender/nondistended  	: No suprapubic tenderness  	UE: Warm, no cyanosis  no clubbing,  no edema; no asterixis  	LE: Warm, no cyanosis  no clubbing, no edema  	Neuro: alert and oriented. speech coherent   	Skin: Warm, no decrease skin turgor       LABS/STUDIES  --------------------------------------------------------------------------------              8.4    16.49 >-----------<  320      [04-16-19 @ 09:00]              29.1     144  |  107  |  47  ----------------------------<  154      [04-16-19 @ 06:21]  4.0   |  21  |  3.67        Ca     9.7     [04-16-19 @ 06:21]      PT/INR: PT 16.4 , INR 1.44       [04-16-19 @ 08:42]  PTT: 75.2       [04-16-19 @ 08:42]      Creatinine Trend:  SCr 3.67 [04-16 @ 06:21]  SCr 3.70 [04-15 @ 06:40]  SCr 3.89 [04-14 @ 05:58]  SCr 3.55 [04-13 @ 19:38]              Urinalysis - [04-15-19 @ 16:48]      Color Light Yellow / Appearance Clear / SG 1.008 / pH 6.0      Gluc Negative / Ketone Negative  / Bili Negative / Urobili <2 mg/dL       Blood Trace / Protein 100 mg/dL / Leuk Est Negative / Nitrite Negative      RBC 1 / WBC 1 / Hyaline 0 / Gran  / Sq Epi  / Non Sq Epi 1 / Bacteria TNTC    Urine Creatinine 27      [04-14-19 @ 05:59]  Urine Protein 163      [04-14-19 @ 05:59]    Iron 14, TIBC 289, %sat 5      [04-14-19 @ 09:47]  Ferritin 34      [04-14-19 @ 10:14]  PTH -- (Ca 9.3)      [04-14-19 @ 10:14]   226  Vitamin D (25OH) 37.6      [04-14-19 @ 09:47]  TSH 0.68      [04-13-19 @ 22:30]  Lipid: chol 157, TG 66, HDL 55, LDL 89      [04-14-19 @ 09:47]
[facility]   [phone]  RENAL FOLLOW UP NOTE  --------------------------------------------------------------------------------  Chief Complaint:    24 hour events/subjective:    seen states has wheezing     PAST HISTORY  --------------------------------------------------------------------------------  No significant changes to PMH, PSH, FHx, SHx, unless otherwise noted    ALLERGIES & MEDICATIONS  --------------------------------------------------------------------------------  Allergies    Allegra (Unknown)  amoxicillin (Hives)  Cipro (Hives)  shellfish (Rash)    Intolerances      Standing Inpatient Medications  ALBUTerol/ipratropium for Nebulization 3 milliLiter(s) Nebulizer every 4 hours  amLODIPine   Tablet 10 milliGRAM(s) Oral daily  buDESOnide   0.5 milliGRAM(s) Respule 0.5 milliGRAM(s) Inhalation two times a day  calcium carbonate 1250 mG  + Vitamin D (OsCal 500 + D) 1 Tablet(s) Oral daily  docusate sodium 100 milliGRAM(s) Oral three times a day  epoetin zaina Injectable 13749 Unit(s) SubCutaneous <User Schedule>  ergocalciferol 44447 Unit(s) Oral every week  ertapenem  IVPB 500 milliGRAM(s) IV Intermittent every 24 hours  heparin  Infusion.  Unit(s)/Hr IV Continuous <Continuous>  hydroxychloroquine 200 milliGRAM(s) Oral daily  iron sucrose IVPB 100 milliGRAM(s) IV Intermittent every 24 hours  levothyroxine 50 MICROGram(s) Oral daily  metoprolol succinate  milliGRAM(s) Oral daily  pantoprazole    Tablet 40 milliGRAM(s) Oral before breakfast  predniSONE   Tablet 20 milliGRAM(s) Oral two times a day  predniSONE   Tablet   Oral   senna 2 Tablet(s) Oral at bedtime    PRN Inpatient Medications  guaiFENesin   Syrup  (Sugar-Free) 100 milliGRAM(s) Oral every 6 hours PRN  heparin  Injectable 7500 Unit(s) IV Push every 6 hours PRN  heparin  Injectable 3500 Unit(s) IV Push every 6 hours PRN  melatonin 3 milliGRAM(s) Oral at bedtime PRN  oxyCODONE    IR 2.5 milliGRAM(s) Oral four times a day PRN  polyethylene glycol 3350 17 Gram(s) Oral daily PRN      REVIEW OF SYSTEMS  --------------------------------------------------------------------------------    Gen: denies fatigue, fevers/chills,  CVS: denies chest pain/palpitations  Resp: + SOB/Cough +   GI: Denies N/V/Abd pain  : Denies dysuria/oliguria/hematuria    All other systems were reviewed and are negative, except as noted.    VITALS/PHYSICAL EXAM  --------------------------------------------------------------------------------  T(C): 36.6 (04-19-19 @ 11:56), Max: 36.7 (04-19-19 @ 07:53)  HR: 78 (04-19-19 @ 11:56) (78 - 86)  BP: 159/73 (04-19-19 @ 11:56) (149/73 - 166/77)  RR: 18 (04-19-19 @ 11:56) (18 - 18)  SpO2: 95% (04-19-19 @ 11:56) (93% - 95%)  Wt(kg): --        04-18-19 @ 07:01  -  04-19-19 @ 07:00  --------------------------------------------------------  IN: 620 mL / OUT: 2150 mL / NET: -1530 mL    04-19-19 @ 07:01  -  04-19-19 @ 15:43  --------------------------------------------------------  IN: 240 mL / OUT: 700 mL / NET: -460 mL      Physical Exam:  	    Physical Exam:  	  	  Gen: Non toxic comfortable appearing   	no jvd   	Pulm: decrease bs  + wheezing 	  CV: RRR, S1S2; no rub  Abd: +BS, soft, nontender/nondistended  	: No suprapubic tenderness  	UE: Warm, no cyanosis  no clubbing,  no edema  	LE: Warm, no cyanosis  no clubbing, no edema  	Neuro: alert and oriented. speech coherent   	  LABS/STUDIES  --------------------------------------------------------------------------------              8.2    19.09 >-----------<  370      [04-19-19 @ 09:39]              27.6     138  |  103  |  61  ----------------------------<  228      [04-19-19 @ 05:53]  5.1   |  21  |  3.91        Ca     10.3     [04-19-19 @ 05:53]    TPro  6.3  /  Alb  3.5  /  TBili  0.1  /  DBili  x   /  AST  9   /  ALT  9   /  AlkPhos  75  [04-18-19 @ 05:37]    PT/INR: PT 18.9 , INR 1.62       [04-19-19 @ 08:47]  PTT: 48.3       [04-19-19 @ 08:47]    Uric acid 7.3      [04-18-19 @ 05:37]    Creatinine Trend:  SCr 3.91 [04-19 @ 05:53]  SCr 3.71 [04-18 @ 05:37]  SCr 3.75 [04-17 @ 06:47]  SCr 3.67 [04-16 @ 06:21]  SCr 3.70 [04-15 @ 06:40]              Urinalysis - [04-17-19 @ 22:19]      Color  / Appearance  / SG  / pH       Gluc  / Ketone   / Bili  / Urobili        Blood  / Protein  / Leuk Est  / Nitrite       RBC 7 / WBC 3 / Hyaline 1 / Gran  / Sq Epi  / Non Sq Epi 2 / Bacteria Negative    Urine Creatinine 75      [04-17-19 @ 22:12]  Urine Protein 424      [04-18-19 @ 03:29]    Iron 14, TIBC 289, %sat 5      [04-14-19 @ 09:47]  Ferritin 34      [04-14-19 @ 10:14]  PTH -- (Ca 9.3)      [04-14-19 @ 10:14]   226  Vitamin D (25OH) 37.6      [04-14-19 @ 09:47]  TSH 0.68      [04-13-19 @ 22:30]  Lipid: chol 157, TG 66, HDL 55, LDL 89      [04-14-19 @ 09:47]    LEWIS: titer Negative, pattern --      [04-18-19 @ 00:33]  C3 Complement 110      [04-17-19 @ 23:56]  C4 Complement 35      [04-17-19 @ 23:56]  Immunofixation Serum:   Weak IgM Lambda Band Identified and a weak  IgG Kappa Band Identified    Reference Range: None Detected      [04-17-19 @ 23:56]  SPEP Interpretation: Two weak Gamma-Migrating Paraproteins Identified      [04-17-19 @ 23:56]
[facility]   [phone]  RENAL FOLLOW UP NOTE  --------------------------------------------------------------------------------  Chief Complaint:    24 hour events/subjective:  seen earlier.     seen. has had sob still .     PAST HISTORY  --------------------------------------------------------------------------------  No significant changes to PMH, PSH, FHx, SHx, unless otherwise noted    ALLERGIES & MEDICATIONS  --------------------------------------------------------------------------------  Allergies    Allegra (Unknown)  amoxicillin (Hives)  Cipro (Hives)  shellfish (Rash)    Intolerances      Standing Inpatient Medications  ALBUTerol/ipratropium for Nebulization 3 milliLiter(s) Nebulizer every 4 hours  amLODIPine   Tablet 5 milliGRAM(s) Oral daily  buDESOnide   0.5 milliGRAM(s) Respule 0.5 milliGRAM(s) Inhalation two times a day  calcium carbonate 1250 mG  + Vitamin D (OsCal 500 + D) 1 Tablet(s) Oral daily  ergocalciferol 42958 Unit(s) Oral every week  ertapenem  IVPB 500 milliGRAM(s) IV Intermittent every 24 hours  heparin  Infusion.  Unit(s)/Hr IV Continuous <Continuous>  hydroxychloroquine 200 milliGRAM(s) Oral daily  levothyroxine 50 MICROGram(s) Oral daily  pantoprazole    Tablet 40 milliGRAM(s) Oral before breakfast  predniSONE   Tablet 1 milliGRAM(s) Oral two times a day    PRN Inpatient Medications  heparin  Injectable 7500 Unit(s) IV Push every 6 hours PRN  heparin  Injectable 3500 Unit(s) IV Push every 6 hours PRN  oxyCODONE    IR 2.5 milliGRAM(s) Oral four times a day PRN  zolpidem 5 milliGRAM(s) Oral at bedtime PRN      REVIEW OF SYSTEMS  --------------------------------------------------------------------------------    Gen: denies  fevers/chills,  CVS: denies chest pain/palpitations  Resp: +  SOB/Cough =  GI: Denies N/V/Abd pain  : Denies dysuria/oliguria/hematuria    All other systems were reviewed and are negative, except as noted.    VITALS/PHYSICAL EXAM  --------------------------------------------------------------------------------  T(C): 36.7 (04-15-19 @ 13:06), Max: 36.9 (04-14-19 @ 20:52)  HR: 89 (04-15-19 @ 13:06) (84 - 93)  BP: 155/65 (04-15-19 @ 13:06) (143/65 - 166/75)  RR: 18 (04-15-19 @ 13:06) (17 - 18)  SpO2: 95% (04-15-19 @ 13:06) (93% - 100%)  Wt(kg): --  Height (cm): 170.18 (04-13-19 @ 17:25)  Weight (kg): 90.7 (04-13-19 @ 17:25)  BMI (kg/m2): 31.3 (04-13-19 @ 17:25)  BSA (m2): 2.02 (04-13-19 @ 17:25)      04-14-19 @ 07:01  -  04-15-19 @ 07:00  --------------------------------------------------------  IN: 220 mL / OUT: 250 mL / NET: -30 mL    04-15-19 @ 07:01  -  04-15-19 @ 16:12  --------------------------------------------------------  IN: 595 mL / OUT: 0 mL / NET: 595 mL      Physical Exam:  	  Gen: Non toxic comfortable appearing   	no jvd   	Pulm: decrease bs  + wheezing + ronchi 	CV: RRR, S1S2; no rub  Abd: +BS, soft, nontender/nondistended  	: No suprapubic tenderness  	UE: Warm, no cyanosis  no clubbing,  no edema; no asterixis  	LE: Warm, no cyanosis  no clubbing, no edema  	Neuro: alert and oriented. speech coherent   	Skin: Warm, no decrease skin turgor       LABS/STUDIES  --------------------------------------------------------------------------------              8.3    15.18 >-----------<  326      [04-15-19 @ 09:05]              28.1     145  |  109  |  47  ----------------------------<  153      [04-15-19 @ 06:40]  3.8   |  19  |  3.70        Ca     9.6     [04-15-19 @ 06:40]      Phos  4.1     [04-14-19 @ 05:58]    TPro  7.3  /  Alb  3.7  /  TBili  0.2  /  DBili  x   /  AST  17  /  ALT  15  /  AlkPhos  90  [04-13-19 @ 19:38]    PT/INR: PT 15.9 , INR 1.38       [04-15-19 @ 09:34]  PTT: 30.8       [04-14-19 @ 16:13]  	    Creatinine Trend:  SCr 3.70 [04-15 @ 06:40]  SCr 3.89 [04-14 @ 05:58]  SCr 3.55 [04-13 @ 19:38]              Urinalysis - [04-14-19 @ 09:47]      Color Light Yellow / Appearance Slightly Turbid / SG 1.009 / pH 6.0      Gluc Trace / Ketone Negative  / Bili Negative / Urobili <2 mg/dL       Blood Trace / Protein 100 mg/dL / Leuk Est Negative / Nitrite Negative      RBC 1 / WBC 1 / Hyaline 1 / Gran  / Sq Epi  / Non Sq Epi 1 / Bacteria TNTC    Urine Creatinine 27      [04-14-19 @ 05:59]  Urine Protein 163      [04-14-19 @ 05:59]    Iron 14, TIBC 289, %sat 5      [04-14-19 @ 09:47]  Ferritin 34      [04-14-19 @ 10:14]  PTH -- (Ca 9.3)      [04-14-19 @ 10:14]   226  Vitamin D (25OH) 37.6      [04-14-19 @ 09:47]  TSH 0.68      [04-13-19 @ 22:30]  Lipid: chol 157, TG 66, HDL 55, LDL 89      [04-14-19 @ 09:47]
[facility]   [phone]  RENAL FOLLOW UP NOTE  --------------------------------------------------------------------------------  Chief Complaint:    24 hour events/subjective:    seen earlier. states breathing is better. appetite is still low.     PAST HISTORY  --------------------------------------------------------------------------------  No significant changes to PMH, PSH, FHx, SHx, unless otherwise noted    ALLERGIES & MEDICATIONS  --------------------------------------------------------------------------------  Allergies    Allegra (Unknown)  amoxicillin (Hives)  Cipro (Hives)  shellfish (Rash)    Intolerances      Standing Inpatient Medications  ALBUTerol/ipratropium for Nebulization 3 milliLiter(s) Nebulizer every 4 hours  amLODIPine   Tablet 10 milliGRAM(s) Oral daily  buDESOnide   0.5 milliGRAM(s) Respule 0.5 milliGRAM(s) Inhalation two times a day  calcium carbonate 1250 mG  + Vitamin D (OsCal 500 + D) 1 Tablet(s) Oral daily  docusate sodium 100 milliGRAM(s) Oral three times a day  epoetin zaina Injectable 24259 Unit(s) SubCutaneous <User Schedule>  ergocalciferol 63215 Unit(s) Oral every week  hydroxychloroquine 200 milliGRAM(s) Oral daily  levothyroxine 50 MICROGram(s) Oral daily  metoprolol succinate  milliGRAM(s) Oral daily  pantoprazole    Tablet 40 milliGRAM(s) Oral before breakfast  predniSONE   Tablet 30 milliGRAM(s) Oral daily  predniSONE   Tablet   Oral   senna 2 Tablet(s) Oral at bedtime    PRN Inpatient Medications  acetaminophen   Tablet .. 650 milliGRAM(s) Oral every 6 hours PRN  guaiFENesin   Syrup  (Sugar-Free) 100 milliGRAM(s) Oral every 6 hours PRN  melatonin 3 milliGRAM(s) Oral at bedtime PRN  oxyCODONE    IR 2.5 milliGRAM(s) Oral four times a day PRN  polyethylene glycol 3350 17 Gram(s) Oral daily PRN      REVIEW OF SYSTEMS  --------------------------------------------------------------------------------    Gen: denies  fevers/chills,  CVS: denies chest pain/palpitations  Resp: denies worsening SOB/Cough  GI: Denies N/V/Abd pain  : Denies dysuria/oliguria/hematuria    All other systems were reviewed and are negative, except as noted.    VITALS/PHYSICAL EXAM  --------------------------------------------------------------------------------  T(C): 36.8 (04-22-19 @ 19:55), Max: 36.8 (04-22-19 @ 19:55)  HR: 73 (04-22-19 @ 19:55) (66 - 73)  BP: 129/65 (04-22-19 @ 19:55) (123/70 - 144/68)  RR: 18 (04-22-19 @ 19:55) (18 - 18)  SpO2: 92% (04-22-19 @ 19:55) (92% - 98%)  Wt(kg): --        04-21-19 @ 07:01  -  04-22-19 @ 07:00  --------------------------------------------------------  IN: 524 mL / OUT: 1100 mL / NET: -576 mL    04-22-19 @ 07:01  -  04-22-19 @ 23:05  --------------------------------------------------------  IN: 620 mL / OUT: 800 mL / NET: -180 mL      Physical Exam:  	  	Gen: alert oriented  more comfortable appearing   	Pulm: Decreased breath sounds b/l bases. no rales or ronchi or wheezing  	CV: RRR, S1/S2. no rub  	Back: right flank hematoma   	Abd: +BS, soft, nontender/nondistended  	: No suprapubic tenderness.               Extremity: No cyanosis, no edema no clubbing  	    LABS/STUDIES  --------------------------------------------------------------------------------              8.6    24.3  >-----------<  290      [04-22-19 @ 14:23]              26.3     139  |  104  |  97  ----------------------------<  238      [04-22-19 @ 02:22]  4.8   |  20  |  3.99        Ca     9.7     [04-22-19 @ 02:22]      Mg     2.3     [04-22-19 @ 02:22]      PT/INR: PT 21.5 , INR 1.85       [04-22-19 @ 02:22]  PTT: 28.9       [04-22-19 @ 02:22]      Creatinine Trend:  SCr 3.99 [04-22 @ 02:22]  SCr 3.91 [04-21 @ 06:08]  SCr 3.87 [04-20 @ 04:50]  SCr 3.91 [04-19 @ 05:53]  SCr 3.71 [04-18 @ 05:37]              Urinalysis - [04-17-19 @ 22:19]      Color  / Appearance  / SG  / pH       Gluc  / Ketone   / Bili  / Urobili        Blood  / Protein  / Leuk Est  / Nitrite       RBC 7 / WBC 3 / Hyaline 1 / Gran  / Sq Epi  / Non Sq Epi 2 / Bacteria Negative    Urine Creatinine 75      [04-17-19 @ 22:12]  Urine Protein 424      [04-18-19 @ 03:29]    Iron 14, TIBC 289, %sat 5      [04-14-19 @ 09:47]  Ferritin 34      [04-14-19 @ 10:14]  PTH -- (Ca 9.3)      [04-14-19 @ 10:14]   226  Vitamin D (25OH) 37.6      [04-14-19 @ 09:47]  TSH 0.68      [04-13-19 @ 22:30]  Lipid: chol 157, TG 66, HDL 55, LDL 89      [04-14-19 @ 09:47]    LEWIS: titer Negative, pattern --      [04-18-19 @ 00:33]  C3 Complement 110      [04-17-19 @ 23:56]  C4 Complement 35      [04-17-19 @ 23:56]  ANCA: cANCA Negative, pANCA Negative, atypical ANCA Indeterminate Method interference due to LEWIS fluorescence      [04-18-19 @ 00:33]  Immunofixation Serum:   Weak IgM Lambda Band Identified and a weak  IgG Kappa Band Identified    Reference Range: None Detected      [04-17-19 @ 23:56]  SPEP Interpretation: Two weak Gamma-Migrating Paraproteins Identified      [04-17-19 @ 23:56]
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  CT Head: chronic L pontine infarct  Denies SOB in bed: ongoing dry cough  Reaffirms dysphagia  States she does have prior dx RA  Has been receiving One mg prednisone daily (not 20)      Medications:  Vital Signs Last 24 Hrs  T(C): 36.6 (16 Apr 2019 04:23), Max: 36.9 (15 Apr 2019 22:32)  T(F): 97.9 (16 Apr 2019 04:23), Max: 98.4 (15 Apr 2019 22:32)  HR: 100 (16 Apr 2019 06:45) (89 - 100)  BP: 151/80 (16 Apr 2019 06:45) (150/60 - 168/73)  BP(mean): --  RR: 18 (16 Apr 2019 04:23) (18 - 18)  SpO2: 94% (16 Apr 2019 04:23) (93% - 96%)      04-15 @ 07:01  -  04-16 @ 07:00  --------------------------------------------------------  IN: 1416 mL / OUT: 1400 mL / NET: 16 mL        LABS:                        8.4    16.49 )-----------( 320      ( 16 Apr 2019 09:00 )             29.1     04-16    144  |  107  |  47<H>  ----------------------------<  154<H>  4.0   |  21<L>  |  3.67<H>    Ca    9.7      16 Apr 2019 06:21        PT/INR - ( 16 Apr 2019 08:42 )   PT: 16.4 sec;   INR: 1.44 ratio         PTT - ( 16 Apr 2019 08:42 )  PTT:75.2 sec  Procalcitonin, Serum: 0.18 ng/mL (04-15-19 @ 13:13)    Serum Pro-Brain Natriuretic Peptide: 7846 pg/mL (04-13-19 @ 19:38)      CULTURES:  Culture Results:   No growth to date. (04-15 @ 09:56)  Culture Results:   No growth to date. (04-14 @ 18:25)    Most recent blood culture -- 04-15 @ 09:56   -- -- .Blood 04-15 @ 09:56  Most recent blood culture -- 04-14 @ 18:25   -- -- .Blood 04-14 @ 18:25      Physical Examination:  PULM: Bilateral mod-severe wheezing  CVS: Regular rate and rhythm, no murmurs, rubs, or gallops  ABD: Soft, non-tender  EXT:  No clubbing, cyanosis, or edema    RADIOLOGY REVIEWED  CXR:    CT chest:    TTE:
Follow-up Pulm Progress Note  Lai Payton MD  [phone]    Afebrile with stable resp status  Breathing comfortably OOB- wheezing resolved  Denies dyspnea      Vital Signs Last 24 Hrs  T(C): 36.9 (25 Apr 2019 11:24), Max: 37.1 (25 Apr 2019 00:12)  T(F): 98.5 (25 Apr 2019 11:24), Max: 98.7 (25 Apr 2019 00:12)  HR: 72 (25 Apr 2019 11:24) (72 - 79)  BP: 111/69 (25 Apr 2019 11:24) (111/69 - 137/89)  BP(mean): --  RR: 18 (25 Apr 2019 11:24) (18 - 18)  SpO2: 94% (25 Apr 2019 11:24) (94% - 98%)                              8.1    24.6  )-----------( 277      ( 25 Apr 2019 09:18 )             25.2       04-25    137  |  105  |  102<H>  ----------------------------<  275<H>  5.0   |  17<L>  |  3.89<H>    Ca    9.2      25 Apr 2019 09:18      CULTURES:  Culture Results:   No growth to date. (04-15 @ 09:56)  Culture Results:   No growth to date. (04-14 @ 18:25)    Most recent blood culture -- 04-15 @ 09:56   -- -- .Blood 04-15 @ 09:56  Most recent blood culture -- 04-14 @ 18:25   -- -- .Blood 04-14 @ 18:25      Physical Examination:  PULM: no sign wheeze or rhonchi  CVS: Regular rate and rhythm, no murmurs, rubs, or gallops  ABD: Soft, non-tender  EXT:  No clubbing, cyanosis, or edema    RADIOLOGY REVIEWED  CXR:    CT chest:    TTE:
Follow-up Pulm Progress Note  Lai Payton MD  [phone]    Afebrile with stable resp status  Breathing comfortably supine - wheezing resolved    Vital Signs Last 24 Hrs  T(C): 36.5 (24 Apr 2019 12:13), Max: 37 (24 Apr 2019 04:34)  T(F): 97.7 (24 Apr 2019 12:13), Max: 98.6 (24 Apr 2019 04:34)  HR: 69 (24 Apr 2019 12:13) (69 - 81)  BP: 138/72 (24 Apr 2019 12:13) (133/69 - 148/72)  BP(mean): --  RR: 18 (24 Apr 2019 12:13) (18 - 19)  SpO2: 95% (24 Apr 2019 12:13) (94% - 98%)                          8.0    23.0  )-----------( 287      ( 24 Apr 2019 05:55 )             24.4       04-24    137  |  103  |  105<H>  ----------------------------<  263<H>  4.9   |  18<L>  |  4.12<H>    Ca    9.3      24 Apr 2019 05:55        CULTURES:  Culture Results:   No growth to date. (04-15 @ 09:56)  Culture Results:   No growth to date. (04-14 @ 18:25)    Most recent blood culture -- 04-15 @ 09:56   -- -- .Blood 04-15 @ 09:56  Most recent blood culture -- 04-14 @ 18:25   -- -- .Blood 04-14 @ 18:25      Physical Examination:  PULM: no sign wheeze or rhonchi  CVS: Regular rate and rhythm, no murmurs, rubs, or gallops  ABD: Soft, non-tender  EXT:  No clubbing, cyanosis, or edema    RADIOLOGY REVIEWED  CXR:    CT chest:    TTE:
Follow-up Pulm Progress Note  Lai Payton MD  [phone]    Afebrile on ertapenum  Breathing comfortably supine - wheezing resolved    Vital Signs Last 24 Hrs  T(C): 36.7 (19 Apr 2019 07:53), Max: 36.9 (18 Apr 2019 11:30)  T(F): 98.1 (19 Apr 2019 07:53), Max: 98.4 (18 Apr 2019 11:30)  HR: 80 (19 Apr 2019 09:54) (80 - 86)  BP: 166/77 (19 Apr 2019 09:54) (149/73 - 166/77)  BP(mean): --  RR: 18 (19 Apr 2019 09:54) (18 - 18)  SpO2: 94% (19 Apr 2019 09:54) (93% - 96%)                        8.2    19.09 )-----------( 370      ( 19 Apr 2019 09:39 )             27.6     04-19    138  |  103  |  61<H>  ----------------------------<  228<H>  5.1   |  21<L>  |  3.91<H>    Ca    10.3      19 Apr 2019 05:53    TPro  6.3  /  Alb  3.5  /  TBili  0.1<L>  /  DBili  x   /  AST  9<L>  /  ALT  9<L>  /  AlkPhos  75  04-18    Procalcitonin, Serum: 0.18 ng/mL (04-15-19 @ 13:13)    Serum Pro-Brain Natriuretic Peptide: 7846 pg/mL (04-13-19 @ 19:38)      CULTURES:  Culture Results:   No growth to date. (04-15 @ 09:56)  Culture Results:   No growth to date. (04-14 @ 18:25)    Most recent blood culture -- 04-15 @ 09:56   -- -- .Blood 04-15 @ 09:56  Most recent blood culture -- 04-14 @ 18:25   -- -- .Blood 04-14 @ 18:25      Physical Examination:  PULM: no sign wheeze or rhonchi  CVS: Regular rate and rhythm, no murmurs, rubs, or gallops  ABD: Soft, non-tender  EXT:  No clubbing, cyanosis, or edema    RADIOLOGY REVIEWED  CXR:    CT chest:    TTE:
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  Repeat viral PCR negative    Vital Signs Last 24 Hrs  T(C): 36.6 (17 Apr 2019 04:32), Max: 36.7 (17 Apr 2019 00:12)  T(F): 97.9 (17 Apr 2019 04:32), Max: 98 (17 Apr 2019 00:12)  HR: 103 (17 Apr 2019 10:27) (86 - 103)  BP: 150/68 (17 Apr 2019 10:27) (133/61 - 180/82)  BP(mean): --  RR: 20 (17 Apr 2019 05:36) (20 - 22)  SpO2: 95% (17 Apr 2019 05:36) (93% - 96%)                        8.2    13.08 )-----------( 340      ( 17 Apr 2019 09:34 )             27.3       04-17    141  |  107  |  50<H>  ----------------------------<  170<H>  4.2   |  21<L>  |  3.75<H>    Ca    10.1      17 Apr 2019 06:47      Procalcitonin, Serum: 0.18 ng/mL (04-15-19 @ 13:13)    Serum Pro-Brain Natriuretic Peptide: 7846 pg/mL (04-13-19 @ 19:38)      CULTURES:  Culture Results:   No growth to date. (04-15 @ 09:56)  Culture Results:   No growth to date. (04-14 @ 18:25)    Most recent blood culture -- 04-15 @ 09:56   -- -- .Blood 04-15 @ 09:56  Most recent blood culture -- 04-14 @ 18:25   -- -- .Blood 04-14 @ 18:25      Physical Examination:  PULM: Few scattered wheezes: improved sign  CVS: Regular rate and rhythm, no murmurs, rubs, or gallops  ABD: Soft, non-tender  EXT:  No clubbing, cyanosis, or edema    RADIOLOGY REVIEWED  CXR:    CT chest:    TTE:
Follow-up Pulm Progress Note  Lai Payton MD  [phone]    Afebrile   MBS: positive silent aspiration/dypshagia  Breathing comfortably supine - wheezing resolved    Vital Signs Last 24 Hrs  T(C): 36.8 (20 Apr 2019 11:57), Max: 37.1 (19 Apr 2019 20:14)  T(F): 98.2 (20 Apr 2019 11:57), Max: 98.7 (19 Apr 2019 20:14)  HR: 71 (20 Apr 2019 11:57) (71 - 87)  BP: 145/70 (20 Apr 2019 11:57) (145/70 - 167/72)  BP(mean): --  RR: 18 (20 Apr 2019 11:57) (18 - 18)  SpO2: 93% (20 Apr 2019 11:57) (93% - 96%)                         8.3    22.15 )-----------( 379      ( 20 Apr 2019 10:30 )            28.6     04-20    139  |  105  |  75<H>  ----------------------------<  321<H>  5.3   |  20<L>  |  3.87<H>    Ca    9.8      20 Apr 2019 04:50  Serum Pro-Brain Natriuretic Peptide: 7846 pg/mL (04-13-19 @ 19:38)    CULTURES:  Culture Results:   No growth to date. (04-15 @ 09:56)  Culture Results:   No growth to date. (04-14 @ 18:25)    Most recent blood culture -- 04-15 @ 09:56   -- -- .Blood 04-15 @ 09:56  Most recent blood culture -- 04-14 @ 18:25   -- -- .Blood 04-14 @ 18:25      Physical Examination:  PULM: no sign wheeze or rhonchi  CVS: Regular rate and rhythm, no murmurs, rubs, or gallops  ABD: Soft, non-tender  EXT:  No clubbing, cyanosis, or edema    RADIOLOGY REVIEWED  CXR:    CT chest:    TTE:
Follow-up Pulm Progress Note  Lai Payton MD  [phone]    Afebrile day #8 ertapenum  MBS: positive silent aspiration/dypshagia  Breathing comfortably supine - wheezing resolved    Vital Signs Last 24 Hrs  T(C): 36.8 (20 Apr 2019 11:57), Max: 37.1 (19 Apr 2019 20:14)  T(F): 98.2 (20 Apr 2019 11:57), Max: 98.7 (19 Apr 2019 20:14)  HR: 71 (20 Apr 2019 11:57) (71 - 87)  BP: 145/70 (20 Apr 2019 11:57) (145/70 - 167/72)  BP(mean): --  RR: 18 (20 Apr 2019 11:57) (18 - 18)  SpO2: 93% (20 Apr 2019 11:57) (93% - 96%)                         8.3    22.15 )-----------( 379      ( 20 Apr 2019 10:30 )            28.6     04-20    139  |  105  |  75<H>  ----------------------------<  321<H>  5.3   |  20<L>  |  3.87<H>    Ca    9.8      20 Apr 2019 04:50  Serum Pro-Brain Natriuretic Peptide: 7846 pg/mL (04-13-19 @ 19:38)    CULTURES:  Culture Results:   No growth to date. (04-15 @ 09:56)  Culture Results:   No growth to date. (04-14 @ 18:25)    Most recent blood culture -- 04-15 @ 09:56   -- -- .Blood 04-15 @ 09:56  Most recent blood culture -- 04-14 @ 18:25   -- -- .Blood 04-14 @ 18:25      Physical Examination:  PULM: no sign wheeze or rhonchi  CVS: Regular rate and rhythm, no murmurs, rubs, or gallops  ABD: Soft, non-tender  EXT:  No clubbing, cyanosis, or edema    RADIOLOGY REVIEWED  CXR:    CT chest:    TTE:
Follow-up Pulm Progress Note  Lai Payton MD  [phone]    Afebrile on ertapenum  Breathing comfortably supine - wheezing resolved    Vital Signs Last 24 Hrs  T(C): 37.1 (18 Apr 2019 08:35), Max: 37.4 (17 Apr 2019 11:30)  T(F): 98.8 (18 Apr 2019 08:35), Max: 99.3 (17 Apr 2019 11:30)  HR: 86 (18 Apr 2019 08:35) (84 - 102)  BP: 160/74 (18 Apr 2019 08:35) (140/75 - 161/75)  BP(mean): --  RR: 18 (18 Apr 2019 08:35) (18 - 18)  SpO2: 96% (18 Apr 2019 08:35) (93% - 96%)                          8.0    17.13 )-----------( 365      ( 18 Apr 2019 07:42 )             27.1       04-18    140  |  106  |  56<H>  ----------------------------<  266<H>  5.0   |  20<L>  |  3.71<H>    Ca    10.0      18 Apr 2019 05:37    TPro  6.3  /  Alb  3.5  /  TBili  0.1<L>  /  DBili  x   /  AST  9<L>  /  ALT  9<L>  /  AlkPhos  75  04-18    Procalcitonin, Serum: 0.18 ng/mL (04-15-19 @ 13:13)    Serum Pro-Brain Natriuretic Peptide: 7846 pg/mL (04-13-19 @ 19:38)      CULTURES:  Culture Results:   No growth to date. (04-15 @ 09:56)  Culture Results:   No growth to date. (04-14 @ 18:25)    Most recent blood culture -- 04-15 @ 09:56   -- -- .Blood 04-15 @ 09:56  Most recent blood culture -- 04-14 @ 18:25   -- -- .Blood 04-14 @ 18:25      Physical Examination:  PULM: no sign wheeze or rhonchi  CVS: Regular rate and rhythm, no murmurs, rubs, or gallops  ABD: Soft, non-tender  EXT:  No clubbing, cyanosis, or edema    RADIOLOGY REVIEWED  CXR:    CT chest:    TTE:
Follow-up Pulm Progress Note  Lai Payton MD  [phone]    Afebrile on ertapenum  Breathing comfortably supine - wheezing resolved    Vital Signs Last 24 Hrs  T(C): 36.7 (19 Apr 2019 07:53), Max: 36.9 (18 Apr 2019 11:30)  T(F): 98.1 (19 Apr 2019 07:53), Max: 98.4 (18 Apr 2019 11:30)  HR: 80 (19 Apr 2019 09:54) (80 - 86)  BP: 166/77 (19 Apr 2019 09:54) (149/73 - 166/77)  BP(mean): --  RR: 18 (19 Apr 2019 09:54) (18 - 18)  SpO2: 94% (19 Apr 2019 09:54) (93% - 96%)                        8.2    19.09 )-----------( 370      ( 19 Apr 2019 09:39 )             27.6     04-19    138  |  103  |  61<H>  ----------------------------<  228<H>  5.1   |  21<L>  |  3.91<H>    Ca    10.3      19 Apr 2019 05:53    TPro  6.3  /  Alb  3.5  /  TBili  0.1<L>  /  DBili  x   /  AST  9<L>  /  ALT  9<L>  /  AlkPhos  75  04-18    Procalcitonin, Serum: 0.18 ng/mL (04-15-19 @ 13:13)    Serum Pro-Brain Natriuretic Peptide: 7846 pg/mL (04-13-19 @ 19:38)      CULTURES:  Culture Results:   No growth to date. (04-15 @ 09:56)  Culture Results:   No growth to date. (04-14 @ 18:25)    Most recent blood culture -- 04-15 @ 09:56   -- -- .Blood 04-15 @ 09:56  Most recent blood culture -- 04-14 @ 18:25   -- -- .Blood 04-14 @ 18:25      Physical Examination:  PULM: no sign wheeze or rhonchi  CVS: Regular rate and rhythm, no murmurs, rubs, or gallops  ABD: Soft, non-tender  EXT:  No clubbing, cyanosis, or edema    RADIOLOGY REVIEWED  CXR:    CT chest:    TTE:
[facility]   [phone]  RENAL FOLLOW UP NOTE  --------------------------------------------------------------------------------  Chief Complaint:    24 hour events/subjective:    seen earlier. states breathing is improving     PAST HISTORY  --------------------------------------------------------------------------------  No significant changes to PMH, PSH, FHx, SHx, unless otherwise noted    ALLERGIES & MEDICATIONS  --------------------------------------------------------------------------------  Allergies    Allegra (Unknown)  amoxicillin (Hives)  Cipro (Hives)  shellfish (Rash)    Intolerances      Standing Inpatient Medications  ALBUTerol/ipratropium for Nebulization 3 milliLiter(s) Nebulizer every 4 hours  amLODIPine   Tablet 10 milliGRAM(s) Oral daily  buDESOnide   0.5 milliGRAM(s) Respule 0.5 milliGRAM(s) Inhalation two times a day  calcium carbonate 1250 mG  + Vitamin D (OsCal 500 + D) 1 Tablet(s) Oral daily  docusate sodium 100 milliGRAM(s) Oral three times a day  epoetin zaina Injectable 78194 Unit(s) SubCutaneous <User Schedule>  ergocalciferol 08452 Unit(s) Oral every week  ertapenem  IVPB 500 milliGRAM(s) IV Intermittent every 24 hours  furosemide    Tablet 40 milliGRAM(s) Oral daily  heparin  Infusion.  Unit(s)/Hr IV Continuous <Continuous>  hydroxychloroquine 200 milliGRAM(s) Oral daily  iron sucrose IVPB 100 milliGRAM(s) IV Intermittent every 24 hours  levothyroxine 50 MICROGram(s) Oral daily  metoprolol succinate  milliGRAM(s) Oral daily  pantoprazole    Tablet 40 milliGRAM(s) Oral before breakfast  predniSONE   Tablet 20 milliGRAM(s) Oral two times a day  predniSONE   Tablet   Oral   senna 2 Tablet(s) Oral at bedtime  warfarin 5 milliGRAM(s) Oral once    PRN Inpatient Medications  guaiFENesin   Syrup  (Sugar-Free) 100 milliGRAM(s) Oral every 6 hours PRN  heparin  Injectable 7500 Unit(s) IV Push every 6 hours PRN  heparin  Injectable 3500 Unit(s) IV Push every 6 hours PRN  melatonin 3 milliGRAM(s) Oral at bedtime PRN  oxyCODONE    IR 2.5 milliGRAM(s) Oral four times a day PRN  polyethylene glycol 3350 17 Gram(s) Oral daily PRN      REVIEW OF SYSTEMS  --------------------------------------------------------------------------------    Gen: denies fevers/chills,  CVS: denies chest pain/palpitations  Resp: denies SOB/Cough  GI: Denies N/V/Abd pain  : Denies dysuria/oliguria/hematuria    All other systems were reviewed and are negative, except as noted.    VITALS/PHYSICAL EXAM  --------------------------------------------------------------------------------  T(C): 36.8 (04-20-19 @ 11:57), Max: 37.1 (04-19-19 @ 20:14)  HR: 71 (04-20-19 @ 11:57) (71 - 87)  BP: 145/70 (04-20-19 @ 11:57) (145/70 - 167/72)  RR: 18 (04-20-19 @ 11:57) (18 - 18)  SpO2: 93% (04-20-19 @ 11:57) (93% - 96%)  Wt(kg): --        04-19-19 @ 07:01  -  04-20-19 @ 07:00  --------------------------------------------------------  IN: 480 mL / OUT: 1650 mL / NET: -1170 mL    04-20-19 @ 07:01  -  04-20-19 @ 14:15  --------------------------------------------------------  IN: 100 mL / OUT: 500 mL / NET: -400 mL      Physical Exam:  	    Physical Exam:  	Gen: alert oriented  more comfortable appearing   	Pulm: Decreased breath sounds b/l bases. no rales or ronchi or wheezing  	CV: RRR, S1/S2. no rub  	Back: No CVA tenderness; no sacral edema  	Abd: +BS, soft, nontender/nondistended  	: No suprapubic tenderness.               Extremity: No cyanosis, no edema no clubbing  	      LABS/STUDIES  --------------------------------------------------------------------------------              8.3    22.15 >-----------<  379      [04-20-19 @ 10:30]              28.6     139  |  105  |  75  ----------------------------<  321      [04-20-19 @ 04:50]  5.3   |  20  |  3.87        Ca     9.8     [04-20-19 @ 04:50]      PT/INR: PT 19.1 , INR 1.65       [04-20-19 @ 04:51]  PTT: 57.1       [04-20-19 @ 11:24]      Creatinine Trend:  SCr 3.87 [04-20 @ 04:50]  SCr 3.91 [04-19 @ 05:53]  SCr 3.71 [04-18 @ 05:37]  SCr 3.75 [04-17 @ 06:47]  SCr 3.67 [04-16 @ 06:21]              Urinalysis - [04-17-19 @ 22:19]      Color  / Appearance  / SG  / pH       Gluc  / Ketone   / Bili  / Urobili        Blood  / Protein  / Leuk Est  / Nitrite       RBC 7 / WBC 3 / Hyaline 1 / Gran  / Sq Epi  / Non Sq Epi 2 / Bacteria Negative    Urine Creatinine 75      [04-17-19 @ 22:12]  Urine Protein 424      [04-18-19 @ 03:29]    Iron 14, TIBC 289, %sat 5      [04-14-19 @ 09:47]  Ferritin 34      [04-14-19 @ 10:14]  PTH -- (Ca 9.3)      [04-14-19 @ 10:14]   226  Vitamin D (25OH) 37.6      [04-14-19 @ 09:47]  TSH 0.68      [04-13-19 @ 22:30]  Lipid: chol 157, TG 66, HDL 55, LDL 89      [04-14-19 @ 09:47]    LEWIS: titer Negative, pattern --      [04-18-19 @ 00:33]  C3 Complement 110      [04-17-19 @ 23:56]  C4 Complement 35      [04-17-19 @ 23:56]  ANCA: cANCA Negative, pANCA Negative, atypical ANCA Indeterminate Method interference due to LEWIS fluorescence      [04-18-19 @ 00:33]  Immunofixation Serum:   Weak IgM Lambda Band Identified and a weak  IgG Kappa Band Identified    Reference Range: None Detected      [04-17-19 @ 23:56]  SPEP Interpretation: Two weak Gamma-Migrating Paraproteins Identified      [04-17-19 @ 23:56]
[facility], Division of Infectious Diseases  BETHEL Vega A. Lee  [phone]    Name: JACKI SEWELL  Age: 84y  Gender: Female  MRN: [id number]    Interval History--  Notes reviewed. Pleasantly confused. No complaints.  Now off antibiotics.  Comfortable on NC.      Past Medical History--  CKD (chronic kidney disease)  Prediabetes  Breast cancer  Hypothyroid  Anarthritic Rheumatoid Disease  Mee HTN  GERD (Gastroesophageal Reflux  S/P shoulder surgery  Embolus  H/O: Hysterectomy      For details regarding the patient's social history, family history, and other miscellaneous elements, please refer the initial infectious diseases consultation and/or the admitting history and physical examination for this admission.    Allergies    Allegra (Unknown)  amoxicillin (Hives)  Cipro (Hives)  shellfish (Rash)    Intolerances        Medications--  Antibiotics:  hydroxychloroquine 200 milliGRAM(s) Oral daily    Immunologic:  epoetin zaina Injectable 78201 Unit(s) SubCutaneous <User Schedule>    Other:  acetaminophen   Tablet .. PRN  ALBUTerol/ipratropium for Nebulization  amLODIPine   Tablet  buDESOnide   0.5 milliGRAM(s) Respule  calcium carbonate 1250 mG  + Vitamin D (OsCal 500 + D)  docusate sodium  ergocalciferol  guaiFENesin   Syrup  (Sugar-Free) PRN  levothyroxine  melatonin PRN  metoprolol succinate ER  oxyCODONE    IR PRN  pantoprazole    Tablet  polyethylene glycol 3350 PRN  predniSONE   Tablet  predniSONE   Tablet  senna      Review of Systems--  Not clear that she is 100% reliable but otherwise negative.     Physical Examination--  Vital Signs: T(F): 98.1 (04-22-19 @ 12:47), Max: 98.1 (04-22-19 @ 12:47)  HR: 67 (04-22-19 @ 12:47)  BP: 123/70 (04-22-19 @ 12:47)  RR: 18 (04-22-19 @ 12:47)  SpO2: 96% (04-22-19 @ 12:47)  Wt(kg): --  General: Nontoxic-appearing Female in no acute distress.  HEENT: AT/NC. Anicteric. Conjunctiva pale and moist. Oropharynx clear.   Neck: Not rigid. No sense of mass.  Nodes: None palpable.  Lungs: Diminished breath sounds bilaterally, poor effort.   Heart: Regular rate and rhythm. No Murmur. No rub. No gallop. No palpable thrill.  Abdomen: Bowel sounds present and normoactive. Soft. Nondistended. Nontender. No sense of mass. No organomegaly.  Extremities: No cyanosis or clubbing. No edema.   Skin: Warm. Dry. Good turgor. No rash. No vasculitic stigmata.  Psychiatric: Appropriate affect and mood for situation.     Laboratory Studies--  CBC                        8.6    24.3  )-----------( 290      ( 22 Apr 2019 14:23 )             26.3       Chemistries  04-22    139  |  104  |  97<H>  ----------------------------<  238<H>  4.8   |  20<L>  |  3.99<H>    Ca    9.7      22 Apr 2019 02:22  Mg     2.3     04-22      Culture Data  Culture - Urine (collected 16 Apr 2019 06:07)  Source: .Urine  Final Report (17 Apr 2019 09:45):    <10,000 CFU/mL Normal Urogenital Carmita
[facility]   [phone]  RENAL FOLLOW UP NOTE  --------------------------------------------------------------------------------  Chief Complaint:    24 hour events/subjective:    seen earlier. states wheezing is less    PAST HISTORY  --------------------------------------------------------------------------------  No significant changes to PMH, PSH, FHx, SHx, unless otherwise noted    ALLERGIES & MEDICATIONS  --------------------------------------------------------------------------------  Allergies    Allegra (Unknown)  amoxicillin (Hives)  Cipro (Hives)  shellfish (Rash)    Intolerances      Standing Inpatient Medications  ALBUTerol/ipratropium for Nebulization 3 milliLiter(s) Nebulizer every 4 hours  amLODIPine   Tablet 10 milliGRAM(s) Oral daily  buDESOnide   0.5 milliGRAM(s) Respule 0.5 milliGRAM(s) Inhalation two times a day  calcium carbonate 1250 mG  + Vitamin D (OsCal 500 + D) 1 Tablet(s) Oral daily  docusate sodium 100 milliGRAM(s) Oral three times a day  ergocalciferol 57289 Unit(s) Oral every week  ertapenem  IVPB 500 milliGRAM(s) IV Intermittent every 24 hours  furosemide   Injectable 40 milliGRAM(s) IV Push daily  heparin  Infusion.  Unit(s)/Hr IV Continuous <Continuous>  hydroxychloroquine 200 milliGRAM(s) Oral daily  levothyroxine 50 MICROGram(s) Oral daily  metoprolol succinate ER 50 milliGRAM(s) Oral daily  pantoprazole    Tablet 40 milliGRAM(s) Oral before breakfast  predniSONE   Tablet 30 milliGRAM(s) Oral two times a day  predniSONE   Tablet   Oral   senna 2 Tablet(s) Oral at bedtime    PRN Inpatient Medications  guaiFENesin   Syrup  (Sugar-Free) 100 milliGRAM(s) Oral every 6 hours PRN  heparin  Injectable 7500 Unit(s) IV Push every 6 hours PRN  heparin  Injectable 3500 Unit(s) IV Push every 6 hours PRN  melatonin 3 milliGRAM(s) Oral at bedtime PRN  oxyCODONE    IR 2.5 milliGRAM(s) Oral four times a day PRN  polyethylene glycol 3350 17 Gram(s) Oral daily PRN      REVIEW OF SYSTEMS  --------------------------------------------------------------------------------    Gen: denies fevers/chills,  CVS: denies chest pain/palpitations  Resp: denies SOB/Cough + wheezing   GI: Denies N/V/Abd pain  : Denies dysuria/oliguria/hematuria    All other systems were reviewed and are negative, except as noted.    VITALS/PHYSICAL EXAM  --------------------------------------------------------------------------------  T(C): 36.9 (04-18-19 @ 11:30), Max: 37.1 (04-18-19 @ 08:35)  HR: 82 (04-18-19 @ 11:30) (82 - 91)  BP: 158/73 (04-18-19 @ 11:30) (140/75 - 160/74)  RR: 18 (04-18-19 @ 11:30) (18 - 18)  SpO2: 96% (04-18-19 @ 11:30) (93% - 96%)  Wt(kg): --        04-17-19 @ 07:01  -  04-18-19 @ 07:00  --------------------------------------------------------  IN: 555 mL / OUT: 800 mL / NET: -245 mL    04-18-19 @ 07:01  -  04-18-19 @ 15:50  --------------------------------------------------------  IN: 500 mL / OUT: 650 mL / NET: -150 mL      Physical Exam:  	  Gen: Non toxic comfortable appearing   	no jvd   	Pulm: decrease bs  + wheezing 	  CV: RRR, S1S2; no rub  Abd: +BS, soft, nontender/nondistended  	: No suprapubic tenderness  	UE: Warm, no cyanosis  no clubbing,  no edema  	LE: Warm, no cyanosis  no clubbing, no edema  	Neuro: alert and oriented. speech coherent   	Skin: Warm, no decrease skin turgor   	      LABS/STUDIES  --------------------------------------------------------------------------------              8.0    17.13 >-----------<  365      [04-18-19 @ 07:42]              27.1     140  |  106  |  56  ----------------------------<  266      [04-18-19 @ 05:37]  5.0   |  20  |  3.71        Ca     10.0     [04-18-19 @ 05:37]    TPro  6.3  /  Alb  3.5  /  TBili  0.1  /  DBili  x   /  AST  9   /  ALT  9   /  AlkPhos  75  [04-18-19 @ 05:37]    PT/INR: PT 16.7 , INR 1.44       [04-17-19 @ 07:54]  PTT: 73.3       [04-18-19 @ 12:55]    Uric acid 7.3      [04-18-19 @ 05:37]    Creatinine Trend:  SCr 3.71 [04-18 @ 05:37]  SCr 3.75 [04-17 @ 06:47]  SCr 3.67 [04-16 @ 06:21]  SCr 3.70 [04-15 @ 06:40]  SCr 3.89 [04-14 @ 05:58]              Urinalysis - [04-17-19 @ 22:19]      Color  / Appearance  / SG  / pH       Gluc  / Ketone   / Bili  / Urobili        Blood  / Protein  / Leuk Est  / Nitrite       RBC 7 / WBC 3 / Hyaline 1 / Gran  / Sq Epi  / Non Sq Epi 2 / Bacteria Negative    Urine Creatinine 75      [04-17-19 @ 22:12]  Urine Protein 424      [04-18-19 @ 03:29]    Iron 14, TIBC 289, %sat 5      [04-14-19 @ 09:47]  Ferritin 34      [04-14-19 @ 10:14]  PTH -- (Ca 9.3)      [04-14-19 @ 10:14]   226  Vitamin D (25OH) 37.6      [04-14-19 @ 09:47]  TSH 0.68      [04-13-19 @ 22:30]  Lipid: chol 157, TG 66, HDL 55, LDL 89      [04-14-19 @ 09:47]    C3 Complement 110      [04-17-19 @ 23:56]  C4 Complement 35      [04-17-19 @ 23:56]
INTERVAL HPI/OVERNIGHT EVENTS:  Patient S&E at bedside.       VITAL SIGNS:  T(F): 97.9 (19 @ 04:23)  HR: 100 (19 @ 06:45)  BP: 151/80 (19 @ 06:45)  RR: 18 (19 @ 04:23)  SpO2: 94% (19 @ 04:23)      PHYSICAL EXAM:  GENERAL: NAD, well-developed  HEAD:  Atraumatic, Normocephalic  EYES: EOMI, conjunctiva and sclera clear  NECK: Supple  CHEST/LUNG: +expiratory wheezing bilaterally   HEART: Regular rate and rhythm; No murmurs, rubs, or gallops  ABDOMEN: Soft, Nontender, Nondistended; Bowel sounds present  EXTREMITIES:  No clubbing, cyanosis, or edema  NEUROLOGY: non-focal  SKIN: No rashes or lesions      MEDICATIONS  (STANDING):  ALBUTerol/ipratropium for Nebulization 3 milliLiter(s) Nebulizer every 4 hours  amLODIPine   Tablet 5 milliGRAM(s) Oral daily  buDESOnide   0.5 milliGRAM(s) Respule 0.5 milliGRAM(s) Inhalation two times a day  calcium carbonate 1250 mG  + Vitamin D (OsCal 500 + D) 1 Tablet(s) Oral daily  ergocalciferol 40727 Unit(s) Oral every week  ertapenem  IVPB 500 milliGRAM(s) IV Intermittent every 24 hours  heparin  Infusion.  Unit(s)/Hr (17 mL/Hr) IV Continuous <Continuous>  hydroxychloroquine 200 milliGRAM(s) Oral daily  levothyroxine 50 MICROGram(s) Oral daily  pantoprazole    Tablet 40 milliGRAM(s) Oral before breakfast  predniSONE   Tablet 1 milliGRAM(s) Oral two times a day    MEDICATIONS  (PRN):  guaiFENesin   Syrup  (Sugar-Free) 100 milliGRAM(s) Oral every 6 hours PRN Cough  heparin  Injectable 7500 Unit(s) IV Push every 6 hours PRN For aPTT less than 40  heparin  Injectable 3500 Unit(s) IV Push every 6 hours PRN For aPTT between 40 - 57  oxyCODONE    IR 2.5 milliGRAM(s) Oral four times a day PRN Severe Pain (7 - 10)  zolpidem 5 milliGRAM(s) Oral at bedtime PRN Insomnia      Allergies    Allegra (Unknown)  amoxicillin (Hives)  Cipro (Hives)  shellfish (Rash)            LABS:                        8.4    16.49 )-----------( 320      ( 2019 09:00 )             29.1     04-16    144  |  107  |  47<H>  ----------------------------<  154<H>  4.0   |  21<L>  |  3.67<H>    Ca    9.7      2019 06:21      PT/INR - ( 2019 08:42 )   PT: 16.4 sec;   INR: 1.44 ratio         PTT - ( 2019 08:42 )  PTT:75.2 sec  Urinalysis Basic - ( 15 Apr 2019 16:48 )    Color: Light Yellow / Appearance: Clear / S.008 / pH: x  Gluc: x / Ketone: Negative  / Bili: Negative / Urobili: <2 mg/dL   Blood: x / Protein: 100 mg/dL / Nitrite: Negative   Leuk Esterase: Negative / RBC: 1 /HPF / WBC 1 /HPF   Sq Epi: x / Non Sq Epi: 1 /HPF / Bacteria: TNTC        RADIOLOGY & ADDITIONAL TESTS:  Studies reviewed.
INTERVAL HPI/OVERNIGHT EVENTS:  Patient S&E at bedside.   Called back for recommendations regarding anticoagulation in the setting of new gastrocnemius DVT.  Patient feeling well, breathing stable.       VITAL SIGNS:  T(F): 98.1 (04-22-19 @ 12:47)  HR: 67 (04-22-19 @ 12:47)  BP: 123/70 (04-22-19 @ 12:47)  RR: 18 (04-22-19 @ 12:47)  SpO2: 96% (04-22-19 @ 12:47)        PHYSICAL EXAM:    Constitutional: NAD  Eyes: EOMI, sclera non-icteric  Neck: supple  Respiratory: CTA b/l, good air entry b/l  Cardiovascular: RRR, no M/R/G  Gastrointestinal: soft, NTND  Extremities: no c/c/e  Neurological: AAOx3      MEDICATIONS  (STANDING):  ALBUTerol/ipratropium for Nebulization 3 milliLiter(s) Nebulizer every 4 hours  amLODIPine   Tablet 10 milliGRAM(s) Oral daily  buDESOnide   0.5 milliGRAM(s) Respule 0.5 milliGRAM(s) Inhalation two times a day  calcium carbonate 1250 mG  + Vitamin D (OsCal 500 + D) 1 Tablet(s) Oral daily  docusate sodium 100 milliGRAM(s) Oral three times a day  epoetin zaina Injectable 07371 Unit(s) SubCutaneous <User Schedule>  ergocalciferol 00124 Unit(s) Oral every week  furosemide    Tablet 40 milliGRAM(s) Oral daily  hydroxychloroquine 200 milliGRAM(s) Oral daily  levothyroxine 50 MICROGram(s) Oral daily  metoprolol succinate  milliGRAM(s) Oral daily  pantoprazole    Tablet 40 milliGRAM(s) Oral before breakfast  predniSONE   Tablet 30 milliGRAM(s) Oral daily  predniSONE   Tablet   Oral   senna 2 Tablet(s) Oral at bedtime    MEDICATIONS  (PRN):  acetaminophen   Tablet .. 650 milliGRAM(s) Oral every 6 hours PRN Mild Pain (1 - 3)  guaiFENesin   Syrup  (Sugar-Free) 100 milliGRAM(s) Oral every 6 hours PRN Cough  melatonin 3 milliGRAM(s) Oral at bedtime PRN Insomnia  oxyCODONE    IR 2.5 milliGRAM(s) Oral four times a day PRN Severe Pain (7 - 10)  polyethylene glycol 3350 17 Gram(s) Oral daily PRN Constipation      Allergies    Allegra (Unknown)  amoxicillin (Hives)  Cipro (Hives)  shellfish (Rash)          LABS:                        8.6    24.3  )-----------( 290      ( 22 Apr 2019 14:23 )             26.3     04-22    139  |  104  |  97<H>  ----------------------------<  238<H>  4.8   |  20<L>  |  3.99<H>    Ca    9.7      22 Apr 2019 02:22  Mg     2.3     04-22      PT/INR - ( 22 Apr 2019 02:22 )   PT: 21.5 sec;   INR: 1.85 ratio         PTT - ( 22 Apr 2019 02:22 )  PTT:28.9 sec      RADIOLOGY & ADDITIONAL TESTS:  Studies reviewed.
[facility], Division of Infectious Diseases  BETHEL Vega A. Lee  [phone]    Name: JACKI SEWELL  Age: 84y  Gender: Female  MRN: [id number]    Interval History--  Notes reviewed. Appears to want to be left alone. "I just want to be with my ... and go home to my family." Keeps eyes shut for most of the encounter. Didn't eat breakfast.     Past Medical History--  CKD (chronic kidney disease)  Prediabetes  Breast cancer  Hypothyroid  Anarthritic Rheumatoid Disease  Mee HTN  GERD (Gastroesophageal Reflux  S/P shoulder surgery  Embolus  H/O: Hysterectomy      For details regarding the patient's social history, family history, and other miscellaneous elements, please refer the initial infectious diseases consultation and/or the admitting history and physical examination for this admission.    Allergies    Allegra (Unknown)  amoxicillin (Hives)  Cipro (Hives)  shellfish (Rash)    Intolerances        Medications--  Antibiotics:  ertapenem  IVPB 500 milliGRAM(s) IV Intermittent every 24 hours  hydroxychloroquine 200 milliGRAM(s) Oral daily    Immunologic:    Other:  ALBUTerol/ipratropium for Nebulization  amLODIPine   Tablet  buDESOnide   0.5 milliGRAM(s) Respule  calcium carbonate 1250 mG  + Vitamin D (OsCal 500 + D)  ergocalciferol  guaiFENesin   Syrup  (Sugar-Free) PRN  heparin  Infusion.  heparin  Injectable PRN  heparin  Injectable PRN  levothyroxine  oxyCODONE    IR PRN  pantoprazole    Tablet  predniSONE   Tablet  zolpidem PRN      Review of Systems--  A 10-point review of systems was obtained.   Review of systems otherwise negative except as previously noted.    Physical Examination--  Vital Signs: T(F): 97.9 (04-16-19 @ 04:23), Max: 98.4 (04-15-19 @ 22:32)  HR: 100 (04-16-19 @ 06:45)  BP: 151/80 (04-16-19 @ 06:45)  RR: 18 (04-16-19 @ 04:23)  SpO2: 94% (04-16-19 @ 04:23)  Wt(kg): --  General: Nontoxic-appearing Female in no acute distress.  HEENT: AT/NC. Anicteric. Conjunctiva pale and moist. Oropharynx clear.   Neck: Not rigid. No sense of mass.  Nodes: None palpable.  Lungs: Diminihshed breath sounds bilaterally, poor effort.   Heart: Regular rate and rhythm. No Murmur. No rub. No gallop. No palpable thrill.  Abdomen: Bowel sounds present and normoactive. Soft. Nondistended. Nontender. No sense of mass. No organomegaly.  Back: No spinal tenderness. No costovertebral angle tenderness.   Extremities: No cyanosis or clubbing. No edema.   Skin: Warm. Dry. Good turgor. No rash. No vasculitic stigmata.  Psychiatric: Appropriate affect and mood for situation.         Laboratory Studies--  CBC                        8.4    16.49 )-----------( 320      ( 16 Apr 2019 09:00 )             29.1       Chemistries  04-16    144  |  107  |  47<H>  ----------------------------<  154<H>  4.0   |  21<L>  |  3.67<H>    Ca    9.7      16 Apr 2019 06:21        Culture Data    Culture - Blood (collected 15 Apr 2019 09:56)  Source: .Blood  Preliminary Report (16 Apr 2019 10:01):    No growth to date.    Culture - Blood (collected 14 Apr 2019 18:25)  Source: .Blood  Preliminary Report (15 Apr 2019 19:01):    No growth to date.
[facility]   [phone]  RENAL FOLLOW UP NOTE  --------------------------------------------------------------------------------  Chief Complaint:    24 hour events/subjective:    ssen earlier. states breathing has improved but overall is fatigue     PAST HISTORY  --------------------------------------------------------------------------------  No significant changes to PMH, PSH, FHx, SHx, unless otherwise noted    ALLERGIES & MEDICATIONS  --------------------------------------------------------------------------------  Allergies    Allegra (Unknown)  amoxicillin (Hives)  Cipro (Hives)  shellfish (Rash)    Intolerances      Standing Inpatient Medications  ALBUTerol/ipratropium for Nebulization 3 milliLiter(s) Nebulizer every 4 hours  amLODIPine   Tablet 10 milliGRAM(s) Oral daily  buDESOnide   0.5 milliGRAM(s) Respule 0.5 milliGRAM(s) Inhalation two times a day  calcium carbonate 1250 mG  + Vitamin D (OsCal 500 + D) 1 Tablet(s) Oral daily  docusate sodium 100 milliGRAM(s) Oral three times a day  epoetin zaina Injectable 53081 Unit(s) SubCutaneous <User Schedule>  ergocalciferol 35781 Unit(s) Oral every week  hydroxychloroquine 200 milliGRAM(s) Oral daily  levothyroxine 50 MICROGram(s) Oral daily  metoprolol succinate  milliGRAM(s) Oral daily  pantoprazole    Tablet 40 milliGRAM(s) Oral before breakfast  predniSONE   Tablet   Oral   senna 2 Tablet(s) Oral at bedtime    PRN Inpatient Medications  acetaminophen   Tablet .. 650 milliGRAM(s) Oral every 6 hours PRN  guaiFENesin   Syrup  (Sugar-Free) 100 milliGRAM(s) Oral every 6 hours PRN  melatonin 3 milliGRAM(s) Oral at bedtime PRN  oxyCODONE    IR 2.5 milliGRAM(s) Oral four times a day PRN  polyethylene glycol 3350 17 Gram(s) Oral daily PRN      REVIEW OF SYSTEMS  --------------------------------------------------------------------------------    Gen: denies fevers/chills,  CVS: denies chest pain/palpitations  Resp: denies SOB/Cough  GI: Denies N/V/Abd pain  : Denies dysuria/oliguria/hematuria    All other systems were reviewed and are negative, except as noted.    VITALS/PHYSICAL EXAM  --------------------------------------------------------------------------------  T(C): 36.6 (04-23-19 @ 20:06), Max: 36.7 (04-23-19 @ 00:10)  HR: 78 (04-23-19 @ 20:06) (70 - 81)  BP: 133/69 (04-23-19 @ 20:06) (127/72 - 146/77)  RR: 18 (04-23-19 @ 20:06) (18 - 18)  SpO2: 98% (04-23-19 @ 20:06) (92% - 98%)  Wt(kg): --        04-22-19 @ 07:01  -  04-23-19 @ 07:00  --------------------------------------------------------  IN: 620 mL / OUT: 800 mL / NET: -180 mL    04-23-19 @ 07:01  -  04-23-19 @ 20:47  --------------------------------------------------------  IN: 720 mL / OUT: 400 mL / NET: 320 mL      Physical Exam:  	  Gen: alert oriented    	Pulm: Decreased breath sounds b/l bases. no rales or ronchi or wheezing  	CV: RRR, S1/S2. no rub  	Back: right flank hematoma   	Abd: +BS, soft, nontender/nondistended  	: No suprapubic tenderness.               Extremity: No cyanosis, no edema no clubbing    	  LABS/STUDIES  --------------------------------------------------------------------------------              8.3    25.0  >-----------<  276      [04-23-19 @ 12:58]              25.6     139  |  104  |  102  ----------------------------<  247      [04-23-19 @ 05:48]  5.0   |  19  |  4.28        Ca     9.4     [04-23-19 @ 05:48]      Mg     2.3     [04-22-19 @ 02:22]      PT/INR: PT 21.5 , INR 1.85       [04-22-19 @ 02:22]  PTT: 28.8       [04-23-19 @ 09:03]      Creatinine Trend:  SCr 4.28 [04-23 @ 05:48]  SCr 3.99 [04-22 @ 02:22]  SCr 3.91 [04-21 @ 06:08]  SCr 3.87 [04-20 @ 04:50]  SCr 3.91 [04-19 @ 05:53]              Urinalysis - [04-17-19 @ 22:19]      Color  / Appearance  / SG  / pH       Gluc  / Ketone   / Bili  / Urobili        Blood  / Protein  / Leuk Est  / Nitrite       RBC 7 / WBC 3 / Hyaline 1 / Gran  / Sq Epi  / Non Sq Epi 2 / Bacteria Negative    Urine Creatinine 75      [04-17-19 @ 22:12]  Urine Protein 424      [04-18-19 @ 03:29]    Iron 14, TIBC 289, %sat 5      [04-14-19 @ 09:47]  Ferritin 34      [04-14-19 @ 10:14]  PTH -- (Ca 9.3)      [04-14-19 @ 10:14]   226  Vitamin D (25OH) 37.6      [04-14-19 @ 09:47]  HbA1c 7.0      [04-23-19 @ 09:27]  TSH 0.68      [04-13-19 @ 22:30]  Lipid: chol 157, TG 66, HDL 55, LDL 89      [04-14-19 @ 09:47]    LEWIS: titer Negative, pattern --      [04-18-19 @ 00:33]  C3 Complement 110      [04-17-19 @ 23:56]  C4 Complement 35      [04-17-19 @ 23:56]  ANCA: cANCA Negative, pANCA Negative, atypical ANCA Indeterminate Method interference due to LEWIS fluorescence      [04-18-19 @ 00:33]  Immunofixation Serum:   Weak IgM Lambda Band Identified and a weak  IgG Kappa Band Identified    Reference Range: None Detected      [04-17-19 @ 23:56]  SPEP Interpretation: Two weak Gamma-Migrating Paraproteins Identified      [04-17-19 @ 23:56]
[facility] Cardiology Progress Note  _______________________________    Pt. seen and examined. No new cardiac-related complaints.    Telemetry -sinus 60-80s    T(C): 36.6 (04-25-19 @ 04:44), Max: 37.1 (04-25-19 @ 00:12)  HR: 76 (04-25-19 @ 04:44) (69 - 79)  BP: 136/68 (04-25-19 @ 04:44) (133/69 - 138/72)  RR: 18 (04-25-19 @ 04:44) (18 - 18)  SpO2: 96% (04-25-19 @ 04:44) (95% - 98%)  I&O's Summary    24 Apr 2019 07:01  -  25 Apr 2019 07:00  --------------------------------------------------------  IN: 600 mL / OUT: 1000 mL / NET: -400 mL    25 Apr 2019 07:01  -  25 Apr 2019 09:19  --------------------------------------------------------  IN: 240 mL / OUT: 0 mL / NET: 240 mL        PHYSICAL EXAM:  GENERAL: Alert, NAD.  NECK: Supple  CHEST/LUNG: Clear to auscultation bilaterally; No wheezes, rales, or rhonchi.  HEART: S1 S2 normal, RRR; No murmurs, rubs, or gallops  ABDOMEN: Soft, Nondistended  EXTREMITIES:  No LE edema.      LABS:                        8.0    23.0  )-----------( 287      ( 24 Apr 2019 05:55 )             24.4     04-24    137  |  103  |  105<H>  ----------------------------<  263<H>  4.9   |  18<L>  |  4.12<H>    Ca    9.3      24 Apr 2019 05:55                    MEDICATIONS  (STANDING):  ALBUTerol/ipratropium for Nebulization 3 milliLiter(s) Nebulizer every 4 hours  amLODIPine   Tablet 10 milliGRAM(s) Oral daily  buDESOnide   0.5 milliGRAM(s) Respule 0.5 milliGRAM(s) Inhalation two times a day  calcium carbonate 1250 mG  + Vitamin D (OsCal 500 + D) 1 Tablet(s) Oral daily  docusate sodium 100 milliGRAM(s) Oral three times a day  epoetin zaina Injectable 55912 Unit(s) SubCutaneous <User Schedule>  ergocalciferol 83890 Unit(s) Oral every week  hydroxychloroquine 200 milliGRAM(s) Oral daily  levothyroxine 50 MICROGram(s) Oral daily  metoprolol succinate  milliGRAM(s) Oral daily  pantoprazole    Tablet 40 milliGRAM(s) Oral before breakfast  predniSONE   Tablet   Oral   senna 2 Tablet(s) Oral at bedtime    MEDICATIONS  (PRN):  acetaminophen   Tablet .. 650 milliGRAM(s) Oral every 6 hours PRN Mild Pain (1 - 3)  guaiFENesin   Syrup  (Sugar-Free) 100 milliGRAM(s) Oral every 6 hours PRN Cough  melatonin 3 milliGRAM(s) Oral at bedtime PRN Insomnia  oxyCODONE    IR 2.5 milliGRAM(s) Oral four times a day PRN Severe Pain (7 - 10)  polyethylene glycol 3350 17 Gram(s) Oral daily PRN Constipation        RADIOLOGY & ADDITIONAL TESTS:
Patient is a 84y old  Female who presents with a chief complaint of shortness of breath (15 Apr 2019 13:44)      INTERVAL HPI/OVERNIGHT EVENTS: noted, more lethargic, wheezing today      Vital Signs Last 24 Hrs  T(C): 36.7 (15 Apr 2019 13:06), Max: 36.9 (2019 20:52)  T(F): 98 (15 Apr 2019 13:06), Max: 98.4 (2019 20:52)  HR: 89 (15 Apr 2019 13:06) (84 - 93)  BP: 155/65 (15 Apr 2019 13:06) (143/65 - 166/75)  BP(mean): --  RR: 18 (15 Apr 2019 13:06) (17 - 18)  SpO2: 95% (15 Apr 2019 13:06) (93% - 100%)    ALBUTerol/ipratropium for Nebulization 3 milliLiter(s) Nebulizer every 4 hours  amLODIPine   Tablet 5 milliGRAM(s) Oral daily  buDESOnide   0.5 milliGRAM(s) Respule 0.5 milliGRAM(s) Inhalation two times a day  calcium carbonate 1250 mG  + Vitamin D (OsCal 500 + D) 1 Tablet(s) Oral daily  ergocalciferol 98756 Unit(s) Oral every week  ertapenem  IVPB 500 milliGRAM(s) IV Intermittent every 24 hours  heparin  Infusion.  Unit(s)/Hr IV Continuous <Continuous>  heparin  Injectable 7500 Unit(s) IV Push every 6 hours PRN  heparin  Injectable 3500 Unit(s) IV Push every 6 hours PRN  hydroxychloroquine 200 milliGRAM(s) Oral daily  levothyroxine 50 MICROGram(s) Oral daily  oxyCODONE    IR 2.5 milliGRAM(s) Oral four times a day PRN  pantoprazole    Tablet 40 milliGRAM(s) Oral before breakfast  predniSONE   Tablet 1 milliGRAM(s) Oral two times a day  zolpidem 5 milliGRAM(s) Oral at bedtime PRN      PHYSICAL EXAM:  GENERAL: NAD,   EYES: conjunctiva and sclera clear  ENMT: Moist mucous membranes  NECK: Supple, No JVD, Normal thyroid  NERVOUS SYSTEM:  Alert & Oriented X3,   CHEST/LUNG: Clear to auscultation bilaterally; No rales, rhonchi, wheezing, or rubs  HEART: Regular rate and rhythm; No murmurs, rubs, or gallops  ABDOMEN: Soft, Nontender, Nondistended; Bowel sounds present  EXTREMITIES:  2+ Peripheral Pulses, No clubbing, cyanosis, or edema  LYMPH: No lymphadenopathy noted  SKIN: No rashes or lesions    Consultant(s) Notes Reviewed:  [x ] YES  [ ] NO  Care Discussed with Consultants/Other Providers [ x] YES  [ ] NO    LABS:                        8.3    15.18 )-----------( 326      ( 15 Apr 2019 09:05 )             28.1     04-15    145  |  109<H>  |  47<H>  ----------------------------<  153<H>  3.8   |  19<L>  |  3.70<H>    Ca    9.6      15 Apr 2019 06:40  Phos  4.1     04-14    TPro  7.3  /  Alb  3.7  /  TBili  0.2  /  DBili  x   /  AST  17  /  ALT  15  /  AlkPhos  90  04-13    PT/INR - ( 15 Apr 2019 09:34 )   PT: 15.9 sec;   INR: 1.38 ratio         PTT - ( 2019 16:13 )  PTT:30.8 sec  Urinalysis Basic - ( 2019 09:47 )    Color: Light Yellow / Appearance: Slightly Turbid / S.009 / pH: x  Gluc: x / Ketone: Negative  / Bili: Negative / Urobili: <2 mg/dL   Blood: x / Protein: 100 mg/dL / Nitrite: Negative   Leuk Esterase: Negative / RBC: 1 /HPF / WBC 1 /HPF   Sq Epi: x / Non Sq Epi: 1 /HPF / Bacteria: TNTC      CAPILLARY BLOOD GLUCOSE            Urinalysis Basic - ( 2019 09:47 )    Color: Light Yellow / Appearance: Slightly Turbid / S.009 / pH: x  Gluc: x / Ketone: Negative  / Bili: Negative / Urobili: <2 mg/dL   Blood: x / Protein: 100 mg/dL / Nitrite: Negative   Leuk Esterase: Negative / RBC: 1 /HPF / WBC 1 /HPF   Sq Epi: x / Non Sq Epi: 1 /HPF / Bacteria: TNTC        RADIOLOGY & ADDITIONAL TESTS:    Imaging Personally Reviewed:  [x ] YES  [ ] NO
Patient is a 84y old  Female who presents with a chief complaint of shortness of breath (18 Apr 2019 15:50)      INTERVAL HPI/OVERNIGHT EVENTS: noted, feels well      Vital Signs Last 24 Hrs  T(C): 36.3 (18 Apr 2019 20:21), Max: 37.1 (18 Apr 2019 08:35)  T(F): 97.4 (18 Apr 2019 20:21), Max: 98.8 (18 Apr 2019 08:35)  HR: 86 (18 Apr 2019 20:21) (82 - 89)  BP: 149/73 (18 Apr 2019 20:21) (146/73 - 160/74)  BP(mean): --  RR: 18 (18 Apr 2019 20:21) (18 - 18)  SpO2: 95% (18 Apr 2019 20:21) (93% - 96%)    ALBUTerol/ipratropium for Nebulization 3 milliLiter(s) Nebulizer every 4 hours  amLODIPine   Tablet 10 milliGRAM(s) Oral daily  buDESOnide   0.5 milliGRAM(s) Respule 0.5 milliGRAM(s) Inhalation two times a day  calcium carbonate 1250 mG  + Vitamin D (OsCal 500 + D) 1 Tablet(s) Oral daily  docusate sodium 100 milliGRAM(s) Oral three times a day  epoetin zaina Injectable 46265 Unit(s) SubCutaneous <User Schedule>  ergocalciferol 25005 Unit(s) Oral every week  ertapenem  IVPB 500 milliGRAM(s) IV Intermittent every 24 hours  furosemide   Injectable 40 milliGRAM(s) IV Push daily  guaiFENesin   Syrup  (Sugar-Free) 100 milliGRAM(s) Oral every 6 hours PRN  heparin  Infusion.  Unit(s)/Hr IV Continuous <Continuous>  heparin  Injectable 7500 Unit(s) IV Push every 6 hours PRN  heparin  Injectable 3500 Unit(s) IV Push every 6 hours PRN  hydroxychloroquine 200 milliGRAM(s) Oral daily  iron sucrose IVPB 100 milliGRAM(s) IV Intermittent every 24 hours  levothyroxine 50 MICROGram(s) Oral daily  melatonin 3 milliGRAM(s) Oral at bedtime PRN  metoprolol succinate ER 50 milliGRAM(s) Oral daily  oxyCODONE    IR 2.5 milliGRAM(s) Oral four times a day PRN  pantoprazole    Tablet 40 milliGRAM(s) Oral before breakfast  polyethylene glycol 3350 17 Gram(s) Oral daily PRN  predniSONE   Tablet 30 milliGRAM(s) Oral two times a day  predniSONE   Tablet   Oral   senna 2 Tablet(s) Oral at bedtime      PHYSICAL EXAM:  GENERAL: NAD,   EYES: conjunctiva and sclera clear  ENMT: Moist mucous membranes  NECK: Supple, No JVD, Normal thyroid  NERVOUS SYSTEM:  Alert & Oriented X3,   CHEST/LUNG: Clear to auscultation bilaterally; No rales, rhonchi, wheezing, or rubs  HEART: Regular rate and rhythm; No murmurs, rubs, or gallops  ABDOMEN: Soft, Nontender, Nondistended; Bowel sounds present  EXTREMITIES:  2+ Peripheral Pulses, No clubbing, cyanosis, or edema  LYMPH: No lymphadenopathy noted  SKIN: No rashes or lesions    Consultant(s) Notes Reviewed:  [x ] YES  [ ] NO  Care Discussed with Consultants/Other Providers [ x] YES  [ ] NO    LABS:                        8.0    17.13 )-----------( 365      ( 18 Apr 2019 07:42 )             27.1     04-18    140  |  106  |  56<H>  ----------------------------<  266<H>  5.0   |  20<L>  |  3.71<H>    Ca    10.0      18 Apr 2019 05:37    TPro  6.3  /  Alb  3.5  /  TBili  0.1<L>  /  DBili  x   /  AST  9<L>  /  ALT  9<L>  /  AlkPhos  75  04-18    PT/INR - ( 17 Apr 2019 07:54 )   PT: 16.7 sec;   INR: 1.44 ratio         PTT - ( 18 Apr 2019 19:33 )  PTT:79.0 sec  Urinalysis Basic - ( 17 Apr 2019 22:19 )    Color: x / Appearance: x / SG: x / pH: x  Gluc: x / Ketone: x  / Bili: x / Urobili: x   Blood: x / Protein: x / Nitrite: x   Leuk Esterase: x / RBC: 7 /hpf / WBC 3 /HPF   Sq Epi: x / Non Sq Epi: 2 /hpf / Bacteria: Negative      CAPILLARY BLOOD GLUCOSE            Urinalysis Basic - ( 17 Apr 2019 22:19 )    Color: x / Appearance: x / SG: x / pH: x  Gluc: x / Ketone: x  / Bili: x / Urobili: x   Blood: x / Protein: x / Nitrite: x   Leuk Esterase: x / RBC: 7 /hpf / WBC 3 /HPF   Sq Epi: x / Non Sq Epi: 2 /hpf / Bacteria: Negative        RADIOLOGY & ADDITIONAL TESTS:    Imaging Personally Reviewed:  [ ] YES  [ ] NO
Patient is a 84y old  Female who presents with a chief complaint of shortness of breath (19 Apr 2019 15:42)      INTERVAL HPI/OVERNIGHT EVENTS: noted, feels well      Vital Signs Last 24 Hrs  T(C): 37.1 (19 Apr 2019 20:14), Max: 37.1 (19 Apr 2019 20:14)  T(F): 98.7 (19 Apr 2019 20:14), Max: 98.7 (19 Apr 2019 20:14)  HR: 84 (19 Apr 2019 20:14) (78 - 85)  BP: 153/72 (19 Apr 2019 20:14) (153/72 - 166/77)  BP(mean): --  RR: 18 (19 Apr 2019 20:14) (18 - 18)  SpO2: 94% (19 Apr 2019 20:14) (93% - 96%)    ALBUTerol/ipratropium for Nebulization 3 milliLiter(s) Nebulizer every 4 hours  amLODIPine   Tablet 10 milliGRAM(s) Oral daily  buDESOnide   0.5 milliGRAM(s) Respule 0.5 milliGRAM(s) Inhalation two times a day  calcium carbonate 1250 mG  + Vitamin D (OsCal 500 + D) 1 Tablet(s) Oral daily  docusate sodium 100 milliGRAM(s) Oral three times a day  epoetin zaina Injectable 85772 Unit(s) SubCutaneous <User Schedule>  ergocalciferol 27785 Unit(s) Oral every week  ertapenem  IVPB 500 milliGRAM(s) IV Intermittent every 24 hours  guaiFENesin   Syrup  (Sugar-Free) 100 milliGRAM(s) Oral every 6 hours PRN  heparin  Infusion.  Unit(s)/Hr IV Continuous <Continuous>  heparin  Injectable 7500 Unit(s) IV Push every 6 hours PRN  heparin  Injectable 3500 Unit(s) IV Push every 6 hours PRN  hydroxychloroquine 200 milliGRAM(s) Oral daily  iron sucrose IVPB 100 milliGRAM(s) IV Intermittent every 24 hours  levothyroxine 50 MICROGram(s) Oral daily  melatonin 3 milliGRAM(s) Oral at bedtime PRN  metoprolol succinate  milliGRAM(s) Oral daily  oxyCODONE    IR 2.5 milliGRAM(s) Oral four times a day PRN  pantoprazole    Tablet 40 milliGRAM(s) Oral before breakfast  polyethylene glycol 3350 17 Gram(s) Oral daily PRN  predniSONE   Tablet 20 milliGRAM(s) Oral two times a day  predniSONE   Tablet   Oral   senna 2 Tablet(s) Oral at bedtime      PHYSICAL EXAM:  GENERAL: NAD,   EYES: conjunctiva and sclera clear  ENMT: Moist mucous membranes  NECK: Supple, No JVD, Normal thyroid  NERVOUS SYSTEM:  Alert & Oriented X3,   CHEST/LUNG: Clear to auscultation bilaterally; No rales, rhonchi, wheezing, or rubs  HEART: Regular rate and rhythm; No murmurs, rubs, or gallops  ABDOMEN: Soft, Nontender, Nondistended; Bowel sounds present  EXTREMITIES:  2+ Peripheral Pulses, No clubbing, cyanosis, or edema  LYMPH: No lymphadenopathy noted  SKIN: No rashes or lesions    Consultant(s) Notes Reviewed:  [x ] YES  [ ] NO  Care Discussed with Consultants/Other Providers [ x] YES  [ ] NO    LABS:                        8.2    19.09 )-----------( 370      ( 19 Apr 2019 09:39 )             27.6     04-19    138  |  103  |  61<H>  ----------------------------<  228<H>  5.1   |  21<L>  |  3.91<H>    Ca    10.3      19 Apr 2019 05:53    TPro  6.3  /  Alb  3.5  /  TBili  0.1<L>  /  DBili  x   /  AST  9<L>  /  ALT  9<L>  /  AlkPhos  75  04-18    PT/INR - ( 19 Apr 2019 08:47 )   PT: 18.9 sec;   INR: 1.62 ratio         PTT - ( 19 Apr 2019 15:59 )  PTT:123.8 sec  Urinalysis Basic - ( 17 Apr 2019 22:19 )    Color: x / Appearance: x / SG: x / pH: x  Gluc: x / Ketone: x  / Bili: x / Urobili: x   Blood: x / Protein: x / Nitrite: x   Leuk Esterase: x / RBC: 7 /hpf / WBC 3 /HPF   Sq Epi: x / Non Sq Epi: 2 /hpf / Bacteria: Negative      CAPILLARY BLOOD GLUCOSE            Urinalysis Basic - ( 17 Apr 2019 22:19 )    Color: x / Appearance: x / SG: x / pH: x  Gluc: x / Ketone: x  / Bili: x / Urobili: x   Blood: x / Protein: x / Nitrite: x   Leuk Esterase: x / RBC: 7 /hpf / WBC 3 /HPF   Sq Epi: x / Non Sq Epi: 2 /hpf / Bacteria: Negative        RADIOLOGY & ADDITIONAL TESTS:    Imaging Personally Reviewed:  [x ] YES  [ ] NO
Patient is a 84y old  Female who presents with a chief complaint of shortness of breath (19 Apr 2019 20:38)      INTERVAL HPI/OVERNIGHT EVENTS: noted, feels well      Vital Signs Last 24 Hrs  T(C): 36.9 (20 Apr 2019 07:52), Max: 37.1 (19 Apr 2019 20:14)  T(F): 98.4 (20 Apr 2019 07:52), Max: 98.7 (19 Apr 2019 20:14)  HR: 77 (20 Apr 2019 07:52) (77 - 87)  BP: 162/75 (20 Apr 2019 07:52) (153/72 - 167/72)  BP(mean): --  RR: 18 (20 Apr 2019 07:52) (18 - 18)  SpO2: 95% (20 Apr 2019 07:52) (93% - 96%)    ALBUTerol/ipratropium for Nebulization 3 milliLiter(s) Nebulizer every 4 hours  amLODIPine   Tablet 10 milliGRAM(s) Oral daily  buDESOnide   0.5 milliGRAM(s) Respule 0.5 milliGRAM(s) Inhalation two times a day  calcium carbonate 1250 mG  + Vitamin D (OsCal 500 + D) 1 Tablet(s) Oral daily  docusate sodium 100 milliGRAM(s) Oral three times a day  epoetin zaina Injectable 57533 Unit(s) SubCutaneous <User Schedule>  ergocalciferol 61356 Unit(s) Oral every week  ertapenem  IVPB 500 milliGRAM(s) IV Intermittent every 24 hours  furosemide    Tablet 40 milliGRAM(s) Oral daily  guaiFENesin   Syrup  (Sugar-Free) 100 milliGRAM(s) Oral every 6 hours PRN  heparin  Infusion.  Unit(s)/Hr IV Continuous <Continuous>  heparin  Injectable 7500 Unit(s) IV Push every 6 hours PRN  heparin  Injectable 3500 Unit(s) IV Push every 6 hours PRN  hydroxychloroquine 200 milliGRAM(s) Oral daily  iron sucrose IVPB 100 milliGRAM(s) IV Intermittent every 24 hours  levothyroxine 50 MICROGram(s) Oral daily  melatonin 3 milliGRAM(s) Oral at bedtime PRN  metoprolol succinate  milliGRAM(s) Oral daily  oxyCODONE    IR 2.5 milliGRAM(s) Oral four times a day PRN  pantoprazole    Tablet 40 milliGRAM(s) Oral before breakfast  polyethylene glycol 3350 17 Gram(s) Oral daily PRN  predniSONE   Tablet 20 milliGRAM(s) Oral two times a day  predniSONE   Tablet   Oral   senna 2 Tablet(s) Oral at bedtime      PHYSICAL EXAM:  GENERAL: NAD,   EYES: conjunctiva and sclera clear  ENMT: Moist mucous membranes  NECK: Supple, No JVD, Normal thyroid  NERVOUS SYSTEM:  Alert & Oriented X2-3   CHEST/LUNG: Clear to auscultation bilaterally; No rales, rhonchi, wheezing, or rubs  HEART: Regular rate and rhythm; No murmurs, rubs, or gallops  ABDOMEN: Soft, Nontender, Nondistended; Bowel sounds present  EXTREMITIES:  2+ Peripheral Pulses, No clubbing, cyanosis, or edema  LYMPH: No lymphadenopathy noted  SKIN: No rashes or lesions    Consultant(s) Notes Reviewed:  [x ] YES  [ ] NO  Care Discussed with Consultants/Other Providers [ x] YES  [ ] NO    LABS:                        8.2    19.09 )-----------( 370      ( 19 Apr 2019 09:39 )             27.6     04-20    139  |  105  |  75<H>  ----------------------------<  321<H>  5.3   |  20<L>  |  3.87<H>    Ca    9.8      20 Apr 2019 04:50      PT/INR - ( 20 Apr 2019 04:51 )   PT: 19.1 sec;   INR: 1.65 ratio         PTT - ( 20 Apr 2019 04:51 )  PTT:62.8 sec    CAPILLARY BLOOD GLUCOSE                RADIOLOGY & ADDITIONAL TESTS:    Imaging Personally Reviewed:  [x ] YES  [ ] NO
Patient is a 84y old  Female who presents with a chief complaint of shortness of breath (2019 12:27)      INTERVAL HPI/OVERNIGHT EVENTS: noted, feels well, much alert this am  cough and wheezing improved      Vital Signs Last 24 Hrs  T(C): 37.4 (2019 11:30), Max: 37.4 (2019 11:30)  T(F): 99.3 (2019 11:30), Max: 99.3 (2019 11:30)  HR: 102 (2019 11:30) (86 - 103)  BP: 161/75 (2019 11:30) (133/61 - 180/82)  BP(mean): --  RR: 18 (2019 11:30) (18 - 22)  SpO2: 93% (2019 11:30) (93% - 95%)    ALBUTerol/ipratropium for Nebulization 3 milliLiter(s) Nebulizer every 4 hours  amLODIPine   Tablet 10 milliGRAM(s) Oral daily  buDESOnide   0.5 milliGRAM(s) Respule 0.5 milliGRAM(s) Inhalation two times a day  calcium carbonate 1250 mG  + Vitamin D (OsCal 500 + D) 1 Tablet(s) Oral daily  docusate sodium 100 milliGRAM(s) Oral three times a day  ergocalciferol 27577 Unit(s) Oral every week  ertapenem  IVPB 500 milliGRAM(s) IV Intermittent every 24 hours  guaiFENesin   Syrup  (Sugar-Free) 100 milliGRAM(s) Oral every 6 hours PRN  heparin  Infusion.  Unit(s)/Hr IV Continuous <Continuous>  heparin  Injectable 7500 Unit(s) IV Push every 6 hours PRN  heparin  Injectable 3500 Unit(s) IV Push every 6 hours PRN  hydroxychloroquine 200 milliGRAM(s) Oral daily  levothyroxine 50 MICROGram(s) Oral daily  melatonin 3 milliGRAM(s) Oral at bedtime PRN  metoprolol succinate ER 50 milliGRAM(s) Oral daily  oxyCODONE    IR 2.5 milliGRAM(s) Oral four times a day PRN  pantoprazole    Tablet 40 milliGRAM(s) Oral before breakfast  polyethylene glycol 3350 17 Gram(s) Oral daily PRN  predniSONE   Tablet 30 milliGRAM(s) Oral two times a day  senna 2 Tablet(s) Oral at bedtime      PHYSICAL EXAM:  GENERAL: NAD,   EYES: conjunctiva and sclera clear  ENMT: Moist mucous membranes  NECK: Supple, No JVD, Normal thyroid  NERVOUS SYSTEM:  Alert & Oriented X3,   CHEST/LUNG: occasional rhonchi  HEART: Regular rate and rhythm; No murmurs, rubs, or gallops  ABDOMEN: Soft, Nontender, Nondistended; Bowel sounds present  EXTREMITIES:  2+ Peripheral Pulses, No clubbing, cyanosis, or edema  LYMPH: No lymphadenopathy noted  SKIN: No rashes or lesions    Consultant(s) Notes Reviewed:  [x ] YES  [ ] NO  Care Discussed with Consultants/Other Providers [ x] YES  [ ] NO    LABS:                        8.2    13.08 )-----------( 340      ( 2019 09:34 )             27.3     04-17    141  |  107  |  50<H>  ----------------------------<  170<H>  4.2   |  21<L>  |  3.75<H>    Ca    10.1      2019 06:47      PT/INR - ( 2019 07:54 )   PT: 16.7 sec;   INR: 1.44 ratio         PTT - ( 2019 07:54 )  PTT:134.4 sec  Urinalysis Basic - ( 15 Apr 2019 16:48 )    Color: Light Yellow / Appearance: Clear / S.008 / pH: x  Gluc: x / Ketone: Negative  / Bili: Negative / Urobili: <2 mg/dL   Blood: x / Protein: 100 mg/dL / Nitrite: Negative   Leuk Esterase: Negative / RBC: 1 /HPF / WBC 1 /HPF   Sq Epi: x / Non Sq Epi: 1 /HPF / Bacteria: TNTC      CAPILLARY BLOOD GLUCOSE            Urinalysis Basic - ( 15 Apr 2019 16:48 )    Color: Light Yellow / Appearance: Clear / S.008 / pH: x  Gluc: x / Ketone: Negative  / Bili: Negative / Urobili: <2 mg/dL   Blood: x / Protein: 100 mg/dL / Nitrite: Negative   Leuk Esterase: Negative / RBC: 1 /HPF / WBC 1 /HPF   Sq Epi: x / Non Sq Epi: 1 /HPF / Bacteria: TNTC        RADIOLOGY & ADDITIONAL TESTS:    Imaging Personally Reviewed:  [x ] YES  [ ] NO
Patient is a 84y old  Female who presents with a chief complaint of shortness of breath (21 Apr 2019 13:45)      INTERVAL HPI/OVERNIGHT EVENTS: noted, staff noticed rt flank swelling, bruise  pt denies any pain      Vital Signs Last 24 Hrs  T(C): 36.7 (21 Apr 2019 16:07), Max: 36.9 (21 Apr 2019 04:21)  T(F): 98.1 (21 Apr 2019 16:07), Max: 98.4 (21 Apr 2019 04:21)  HR: 73 (21 Apr 2019 16:07) (67 - 76)  BP: 143/69 (21 Apr 2019 16:07) (143/69 - 169/69)  BP(mean): --  RR: 18 (21 Apr 2019 16:07) (18 - 19)  SpO2: 93% (21 Apr 2019 16:07) (92% - 97%)    acetaminophen   Tablet .. 650 milliGRAM(s) Oral every 6 hours PRN  ALBUTerol/ipratropium for Nebulization 3 milliLiter(s) Nebulizer every 4 hours  amLODIPine   Tablet 10 milliGRAM(s) Oral daily  buDESOnide   0.5 milliGRAM(s) Respule 0.5 milliGRAM(s) Inhalation two times a day  calcium carbonate 1250 mG  + Vitamin D (OsCal 500 + D) 1 Tablet(s) Oral daily  docusate sodium 100 milliGRAM(s) Oral three times a day  epoetin zaina Injectable 00267 Unit(s) SubCutaneous <User Schedule>  ergocalciferol 47860 Unit(s) Oral every week  ertapenem  IVPB 500 milliGRAM(s) IV Intermittent every 24 hours  furosemide    Tablet 40 milliGRAM(s) Oral daily  guaiFENesin   Syrup  (Sugar-Free) 100 milliGRAM(s) Oral every 6 hours PRN  hydroxychloroquine 200 milliGRAM(s) Oral daily  levothyroxine 50 MICROGram(s) Oral daily  melatonin 3 milliGRAM(s) Oral at bedtime PRN  metoprolol succinate  milliGRAM(s) Oral daily  oxyCODONE    IR 2.5 milliGRAM(s) Oral four times a day PRN  pantoprazole    Tablet 40 milliGRAM(s) Oral before breakfast  polyethylene glycol 3350 17 Gram(s) Oral daily PRN  predniSONE   Tablet 30 milliGRAM(s) Oral daily  predniSONE   Tablet   Oral   senna 2 Tablet(s) Oral at bedtime      PHYSICAL EXAM:  GENERAL: NAD,   EYES: conjunctiva and sclera clear  ENMT: Moist mucous membranes  NECK: Supple, No JVD, Normal thyroid  NERVOUS SYSTEM:  Alert & Oriented X3,   CHEST/LUNG: Clear to auscultation bilaterally; No rales, rhonchi, wheezing, or rubs  HEART: Regular rate and rhythm; No murmurs, rubs, or gallops  ABDOMEN: Soft, Nontender, Nondistended; Bowel sounds present  rt flank  echymotic area and adj soft swelling  EXTREMITIES:  2+ Peripheral Pulses, No clubbing, cyanosis, or edema  LYMPH: No lymphadenopathy noted  SKIN: No rashes or lesions    Consultant(s) Notes Reviewed:  [x ] YES  [ ] NO  Care Discussed with Consultants/Other Providers [ x] YES  [ ] NO    LABS:                        8.7    26.4  )-----------( 345      ( 21 Apr 2019 14:33 )             28.0     04-21    142  |  107  |  82<H>  ----------------------------<  243<H>  5.3   |  20<L>  |  3.91<H>    Ca    10.3      21 Apr 2019 06:08      PT/INR - ( 21 Apr 2019 09:28 )   PT: 17.6 sec;   INR: 1.51 ratio         PTT - ( 21 Apr 2019 00:12 )  PTT:93.3 sec    CAPILLARY BLOOD GLUCOSE                RADIOLOGY & ADDITIONAL TESTS:< from: CT Abdomen and Pelvis No Cont (04.21.19 @ 11:25) >      < end of copied text >      Imaging Personally Reviewed:  [x ] YES  [ ] NO
Patient is a 84y old  Female who presents with a chief complaint of shortness of breath (22 Apr 2019 11:24)      INTERVAL HPI/OVERNIGHT EVENTS: noted, feels well   rt sided abd wall swelling and bruise noted      Vital Signs Last 24 Hrs  T(C): 36.7 (22 Apr 2019 12:47), Max: 36.7 (21 Apr 2019 16:07)  T(F): 98.1 (22 Apr 2019 12:47), Max: 98.1 (21 Apr 2019 16:07)  HR: 67 (22 Apr 2019 12:47) (66 - 75)  BP: 123/70 (22 Apr 2019 12:47) (123/70 - 144/68)  BP(mean): --  RR: 18 (22 Apr 2019 12:47) (18 - 19)  SpO2: 96% (22 Apr 2019 12:47) (91% - 98%)    acetaminophen   Tablet .. 650 milliGRAM(s) Oral every 6 hours PRN  ALBUTerol/ipratropium for Nebulization 3 milliLiter(s) Nebulizer every 4 hours  amLODIPine   Tablet 10 milliGRAM(s) Oral daily  buDESOnide   0.5 milliGRAM(s) Respule 0.5 milliGRAM(s) Inhalation two times a day  calcium carbonate 1250 mG  + Vitamin D (OsCal 500 + D) 1 Tablet(s) Oral daily  docusate sodium 100 milliGRAM(s) Oral three times a day  epoetin zaina Injectable 87578 Unit(s) SubCutaneous <User Schedule>  ergocalciferol 91192 Unit(s) Oral every week  furosemide    Tablet 40 milliGRAM(s) Oral daily  guaiFENesin   Syrup  (Sugar-Free) 100 milliGRAM(s) Oral every 6 hours PRN  hydroxychloroquine 200 milliGRAM(s) Oral daily  levothyroxine 50 MICROGram(s) Oral daily  melatonin 3 milliGRAM(s) Oral at bedtime PRN  metoprolol succinate  milliGRAM(s) Oral daily  oxyCODONE    IR 2.5 milliGRAM(s) Oral four times a day PRN  pantoprazole    Tablet 40 milliGRAM(s) Oral before breakfast  polyethylene glycol 3350 17 Gram(s) Oral daily PRN  predniSONE   Tablet 30 milliGRAM(s) Oral daily  predniSONE   Tablet   Oral   senna 2 Tablet(s) Oral at bedtime      PHYSICAL EXAM:  GENERAL: NAD,   EYES: conjunctiva and sclera clear  ENMT: Moist mucous membranes  NECK: Supple, No JVD, Normal thyroid  NERVOUS SYSTEM:  Alert & Oriented X3,   CHEST/LUNG: Clear to auscultation bilaterally; No rales, rhonchi, wheezing, or rubs  HEART: Regular rate and rhythm; No murmurs, rubs, or gallops  ABDOMEN: Soft, rt sided abd wall hematoma-mild tenderness  , Nondistended; Bowel sounds present  EXTREMITIES:  2+ Peripheral Pulses, No clubbing, cyanosis, or edema  LYMPH: No lymphadenopathy noted  SKIN: No rashes or lesions    Consultant(s) Notes Reviewed:  [x ] YES  [ ] NO  Care Discussed with Consultants/Other Providers [ x] YES  [ ] NO    LABS:                        7.6    24.22 )-----------( 314      ( 22 Apr 2019 10:48 )             25.8     04-22    139  |  104  |  97<H>  ----------------------------<  238<H>  4.8   |  20<L>  |  3.99<H>    Ca    9.7      22 Apr 2019 02:22  Mg     2.3     04-22      PT/INR - ( 22 Apr 2019 02:22 )   PT: 21.5 sec;   INR: 1.85 ratio         PTT - ( 22 Apr 2019 02:22 )  PTT:28.9 sec    CAPILLARY BLOOD GLUCOSE                RADIOLOGY & ADDITIONAL TESTS:    Imaging Personally Reviewed:  [ x] YES  [ ] NO
Patient is a 84y old  Female who presents with a chief complaint of shortness of breath (25 Apr 2019 09:19)      SUBJECTIVE / OVERNIGHT EVENTS: no overnight events    ROS:  Resp: No cough no sputum production  CVS: No chest pain no palpitations no orthopnea  GI: no N/V/D  : no dysuria, no hematuria  Neuro: no weakness no paresthesias  Heme: No petechiae no easy bruising  Msk: No joint pain no swelling  Skin: No rash no itching        MEDICATIONS  (STANDING):  ALBUTerol/ipratropium for Nebulization 3 milliLiter(s) Nebulizer every 4 hours  amLODIPine   Tablet 10 milliGRAM(s) Oral daily  buDESOnide   0.5 milliGRAM(s) Respule 0.5 milliGRAM(s) Inhalation two times a day  calcium carbonate 1250 mG  + Vitamin D (OsCal 500 + D) 1 Tablet(s) Oral daily  docusate sodium 100 milliGRAM(s) Oral three times a day  epoetin zaina Injectable 22829 Unit(s) SubCutaneous <User Schedule>  ergocalciferol 19631 Unit(s) Oral every week  hydroxychloroquine 200 milliGRAM(s) Oral daily  levothyroxine 50 MICROGram(s) Oral daily  metoprolol succinate  milliGRAM(s) Oral daily  pantoprazole    Tablet 40 milliGRAM(s) Oral before breakfast  predniSONE   Tablet   Oral   senna 2 Tablet(s) Oral at bedtime    MEDICATIONS  (PRN):  acetaminophen   Tablet .. 650 milliGRAM(s) Oral every 6 hours PRN Mild Pain (1 - 3)  guaiFENesin   Syrup  (Sugar-Free) 100 milliGRAM(s) Oral every 6 hours PRN Cough  melatonin 3 milliGRAM(s) Oral at bedtime PRN Insomnia  oxyCODONE    IR 2.5 milliGRAM(s) Oral four times a day PRN Severe Pain (7 - 10)  polyethylene glycol 3350 17 Gram(s) Oral daily PRN Constipation        CAPILLARY BLOOD GLUCOSE        I&O's Summary    24 Apr 2019 07:01  -  25 Apr 2019 07:00  --------------------------------------------------------  IN: 600 mL / OUT: 1000 mL / NET: -400 mL    25 Apr 2019 07:01  -  25 Apr 2019 12:06  --------------------------------------------------------  IN: 240 mL / OUT: 0 mL / NET: 240 mL        Vital Signs Last 24 Hrs  T(C): 36.9 (25 Apr 2019 11:24), Max: 37.1 (25 Apr 2019 00:12)  T(F): 98.5 (25 Apr 2019 11:24), Max: 98.7 (25 Apr 2019 00:12)  HR: 72 (25 Apr 2019 11:24) (69 - 79)  BP: 111/69 (25 Apr 2019 11:24) (111/69 - 138/72)  BP(mean): --  RR: 18 (25 Apr 2019 11:24) (18 - 18)  SpO2: 94% (25 Apr 2019 11:24) (94% - 98%)    PHYSICAL EXAM:  GENERAL: NAD,   EYES: conjunctiva and sclera clear  ENMT: Moist mucous membranes  NECK: Supple, No JVD, Normal thyroid  NERVOUS SYSTEM:  Alert & Oriented X3,   CHEST/LUNG: Clear to auscultation bilaterally; No rales, rhonchi, wheezing, or rubs  HEART: Regular rate and rhythm; No murmurs, rubs, or gallops  ABDOMEN: Soft, , Nondistended; Bowel sounds present, rt flank and ant abd wall-hematoma, tender  EXTREMITIES:  2+ Peripheral Pulses, No clubbing, cyanosis, or edema  LYMPH: No lymphadenopathy noted  SKIN: No rashes or lesions    LABS:                        8.1    24.6  )-----------( 277      ( 25 Apr 2019 09:18 )             25.2     04-25    137  |  105  |  102<H>  ----------------------------<  275<H>  5.0   |  17<L>  |  3.89<H>    Ca    9.2      25 Apr 2019 09:18      PT/INR - ( 25 Apr 2019 09:18 )   PT: 15.2 sec;   INR: 1.32 ratio                     All consultant(s) notes reviewed and care discussed with other providers        Contact Number, Dr Vera [phone]
Pt. seen and examined.  Bruise mentioned post bed transfer to chair.  No chest pain or resting dyspnea    Telemetry -  Vital Signs Last 24 Hrs  T(C): 36.3 (21 Apr 2019 08:13), Max: 36.9 (21 Apr 2019 04:21)  T(F): 97.4 (21 Apr 2019 08:13), Max: 98.4 (21 Apr 2019 04:21)  HR: 67 (21 Apr 2019 08:13) (67 - 76)  BP: 149/72 (21 Apr 2019 08:13) (145/70 - 169/69)  BP(mean): --  RR: 18 (21 Apr 2019 08:13) (18 - 19)  SpO2: 94% (21 Apr 2019 08:13) (92% - 97%)    I&O's Summary    20 Apr 2019 07:01  -  21 Apr 2019 07:00  --------------------------------------------------------  IN: 220 mL / OUT: 1920 mL / NET: -1700 mL    21 Apr 2019 07:01  -  21 Apr 2019 11:30  --------------------------------------------------------  IN: 100 mL / OUT: 0 mL / NET: 100 mL        PHYSICAL EXAM:  GENERAL: Alert, NAD.  NECK: Supple, No JVD, no carotid bruit.  CHEST/LUNG: Clear to auscultation bilaterally; No wheezes, rales, or rhonchi.  HEART: S1 S2 unchanged  ABDOMEN: Soft, Nontender, Nondistended; Bowel sounds present  EXTREMITIES:   LE edema.      LABS:                        9.0    26.8  )-----------( 345      ( 21 Apr 2019 09:48 )             28.4     04-21    142  |  107  |  82<H>  ----------------------------<  243<H>  5.3   |  20<L>  |  3.91<H>    Ca    10.3      21 Apr 2019 06:08      PT/INR - ( 21 Apr 2019 09:28 )   PT: 17.6 sec;   INR: 1.51 ratio         PTT - ( 21 Apr 2019 00:12 )  PTT:93.3 sec              MEDICATIONS  (STANDING):  ALBUTerol/ipratropium for Nebulization 3 milliLiter(s) Nebulizer every 4 hours  amLODIPine   Tablet 10 milliGRAM(s) Oral daily  buDESOnide   0.5 milliGRAM(s) Respule 0.5 milliGRAM(s) Inhalation two times a day  calcium carbonate 1250 mG  + Vitamin D (OsCal 500 + D) 1 Tablet(s) Oral daily  docusate sodium 100 milliGRAM(s) Oral three times a day  epoetin zaina Injectable 91463 Unit(s) SubCutaneous <User Schedule>  ergocalciferol 61300 Unit(s) Oral every week  ertapenem  IVPB 500 milliGRAM(s) IV Intermittent every 24 hours  furosemide    Tablet 40 milliGRAM(s) Oral daily  hydroxychloroquine 200 milliGRAM(s) Oral daily  levothyroxine 50 MICROGram(s) Oral daily  metoprolol succinate  milliGRAM(s) Oral daily  pantoprazole    Tablet 40 milliGRAM(s) Oral before breakfast  predniSONE   Tablet 30 milliGRAM(s) Oral daily  predniSONE   Tablet   Oral   senna 2 Tablet(s) Oral at bedtime    MEDICATIONS  (PRN):  guaiFENesin   Syrup  (Sugar-Free) 100 milliGRAM(s) Oral every 6 hours PRN Cough  melatonin 3 milliGRAM(s) Oral at bedtime PRN Insomnia  oxyCODONE    IR 2.5 milliGRAM(s) Oral four times a day PRN Severe Pain (7 - 10)  polyethylene glycol 3350 17 Gram(s) Oral daily PRN Constipation      RADIOLOGY & ADDITIONAL TESTS:
Pt. seen and examined.  Improved resp  and mental status as per pt and aid at bedside.  Nebulizer underway.  No new compliants    Telemetry - stable  Vital Signs Last 24 Hrs  T(C): 37.1 (18 Apr 2019 08:35), Max: 37.4 (17 Apr 2019 11:30)  T(F): 98.8 (18 Apr 2019 08:35), Max: 99.3 (17 Apr 2019 11:30)  HR: 86 (18 Apr 2019 08:35) (84 - 102)  BP: 160/74 (18 Apr 2019 08:35) (140/75 - 161/75)  BP(mean): --  RR: 18 (18 Apr 2019 08:35) (18 - 18)  SpO2: 96% (18 Apr 2019 08:35) (93% - 96%)    I&O's Summary    17 Apr 2019 07:01  -  18 Apr 2019 07:00  --------------------------------------------------------  IN: 555 mL / OUT: 800 mL / NET: -245 mL        PHYSICAL EXAM:  GENERAL: Alert, NAD.  NECK: Supple, No JVD, no carotid bruit.  CHEST/LUNG: reduced bs at l base, scattered rhonchi  HEART: S1 S2 normal, RRR; No murmurs, rubs, or gallops  ABDOMEN: Soft, Nontender, Nondistended; Bowel sounds present  EXTREMITIES:  No LE edema.      LABS:                        8.0    17.13 )-----------( 365      ( 18 Apr 2019 07:42 )             27.1     04-18    140  |  106  |  56<H>  ----------------------------<  266<H>  5.0   |  20<L>  |  3.71<H>    Ca    10.0      18 Apr 2019 05:37    TPro  6.3  /  Alb  3.5  /  TBili  0.1<L>  /  DBili  x   /  AST  9<L>  /  ALT  9<L>  /  AlkPhos  75  04-18    PT/INR - ( 17 Apr 2019 07:54 )   PT: 16.7 sec;   INR: 1.44 ratio         PTT - ( 18 Apr 2019 05:37 )  PTT:101.3 sec          Urinalysis Basic - ( 17 Apr 2019 22:19 )    Color: x / Appearance: x / SG: x / pH: x  Gluc: x / Ketone: x  / Bili: x / Urobili: x   Blood: x / Protein: x / Nitrite: x   Leuk Esterase: x / RBC: 7 /hpf / WBC 3 /HPF   Sq Epi: x / Non Sq Epi: 2 /hpf / Bacteria: Negative        MEDICATIONS  (STANDING):  ALBUTerol/ipratropium for Nebulization 3 milliLiter(s) Nebulizer every 4 hours  amLODIPine   Tablet 10 milliGRAM(s) Oral daily  buDESOnide   0.5 milliGRAM(s) Respule 0.5 milliGRAM(s) Inhalation two times a day  calcium carbonate 1250 mG  + Vitamin D (OsCal 500 + D) 1 Tablet(s) Oral daily  docusate sodium 100 milliGRAM(s) Oral three times a day  ergocalciferol 03111 Unit(s) Oral every week  ertapenem  IVPB 500 milliGRAM(s) IV Intermittent every 24 hours  furosemide   Injectable 40 milliGRAM(s) IV Push daily  heparin  Infusion.  Unit(s)/Hr (17 mL/Hr) IV Continuous <Continuous>  hydroxychloroquine 200 milliGRAM(s) Oral daily  levothyroxine 50 MICROGram(s) Oral daily  metoprolol succinate ER 50 milliGRAM(s) Oral daily  pantoprazole    Tablet 40 milliGRAM(s) Oral before breakfast  predniSONE   Tablet 30 milliGRAM(s) Oral two times a day  predniSONE   Tablet   Oral   senna 2 Tablet(s) Oral at bedtime    MEDICATIONS  (PRN):  guaiFENesin   Syrup  (Sugar-Free) 100 milliGRAM(s) Oral every 6 hours PRN Cough  heparin  Injectable 7500 Unit(s) IV Push every 6 hours PRN For aPTT less than 40  heparin  Injectable 3500 Unit(s) IV Push every 6 hours PRN For aPTT between 40 - 57  melatonin 3 milliGRAM(s) Oral at bedtime PRN Insomnia  oxyCODONE    IR 2.5 milliGRAM(s) Oral four times a day PRN Severe Pain (7 - 10)  polyethylene glycol 3350 17 Gram(s) Oral daily PRN Constipation      RADIOLOGY & ADDITIONAL TESTS:
[facility], Division of Infectious Diseases  BETHEL Vega A. Lee  [phone]    Name: JACKI SEWELL  Age: 84y  Gender: Female  MRN: [id number]    Interval History--  Notes reviewed  better but tired because people start bothering her at 4:30 am here    Past Medical History--  CKD (chronic kidney disease)  Prediabetes  Breast cancer  Hypothyroid  Anarthritic Rheumatoid Disease  Mee HTN  GERD (Gastroesophageal Reflux  S/P shoulder surgery  Embolus  H/O: Hysterectomy      For details regarding the patient's social history, family history, and other miscellaneous elements, please refer the initial infectious diseases consultation and/or the admitting history and physical examination for this admission.    Allergies    Allegra (Unknown)  amoxicillin (Hives)  Cipro (Hives)  shellfish (Rash)    Intolerances        Medications--  Antibiotics:  ertapenem  IVPB 500 milliGRAM(s) IV Intermittent every 24 hours  hydroxychloroquine 200 milliGRAM(s) Oral daily    Immunologic:    Other:  ALBUTerol/ipratropium for Nebulization  amLODIPine   Tablet  buDESOnide   0.5 milliGRAM(s) Respule  calcium carbonate 1250 mG  + Vitamin D (OsCal 500 + D)  docusate sodium  ergocalciferol  furosemide   Injectable  guaiFENesin   Syrup  (Sugar-Free) PRN  heparin  Infusion.  heparin  Injectable PRN  heparin  Injectable PRN  levothyroxine  melatonin PRN  metoprolol succinate ER  oxyCODONE    IR PRN  pantoprazole    Tablet  polyethylene glycol 3350 PRN  predniSONE   Tablet  predniSONE   Tablet  senna      Review of Systems--  A 10-point review of systems was obtained.     Pertinent positives and negatives--  Constitutional: No fevers. No Chills. No Rigors.   Cardiovascular: No chest pain. No palpitations.  Respiratory: No shortness of breath. No cough.  Gastrointestinal: No nausea or vomiting. No diarrhea or constipation.   Psychiatric: no anxiety    Review of systems otherwise negative except as previously noted.    Physical Examination--  Vital Signs: T(F): 98.4 (04-18-19 @ 11:30), Max: 98.8 (04-18-19 @ 08:35)  HR: 82 (04-18-19 @ 11:30)  BP: 158/73 (04-18-19 @ 11:30)  RR: 18 (04-18-19 @ 11:30)  SpO2: 96% (04-18-19 @ 11:30)  Wt(kg): --  General: Nontoxic-appearing Female in no acute distress.  HEENT: AT/NC.. Anicteric. Conjunctiva pink and moist.  Neck: Not rigid. No sense of mass.  Nodes: None palpable.  Lungs: Clear bilaterally without rales, wheezing or rhonchi  Heart: Regular rate and rhythm.  Abdomen: Bowel sounds present and normoactive. Soft. Nondistended  Extremities: No cyanosis or clubbing. trace edema.   Skin: Warm. Dry. Good turgor. No rash. No vasculitic stigmata.  Psychiatric: Appropriate affect and mood for situation.         Laboratory Studies--  CBC                        8.0    17.13 )-----------( 365      ( 18 Apr 2019 07:42 )             27.1       Chemistries  04-18    140  |  106  |  56<H>  ----------------------------<  266<H>  5.0   |  20<L>  |  3.71<H>    Ca    10.0      18 Apr 2019 05:37    TPro  6.3  /  Alb  3.5  /  TBili  0.1<L>  /  DBili  x   /  AST  9<L>  /  ALT  9<L>  /  AlkPhos  75  04-18      Culture Data    Culture - Urine (collected 16 Apr 2019 06:07)  Source: .Urine  Final Report (17 Apr 2019 09:45):    <10,000 CFU/mL Normal Urogenital Carmita    Culture - Blood (collected 15 Apr 2019 09:56)  Source: .Blood  Preliminary Report (16 Apr 2019 10:01):    No growth to date.    Culture - Blood (collected 14 Apr 2019 18:25)  Source: .Blood  Preliminary Report (15 Apr 2019 19:01):    No growth to date.          < from: CT Abdomen and Pelvis w/ Oral Cont (04.15.19 @ 21:47) >    EXAM:  CT ABDOMEN AND PELVIS OC                            PROCEDURE DATE:  04/15/2019            INTERPRETATION:  CLINICAL INFORMATION: Breast carcinoma, with shortness   of breath.      COMPARISON: Correlation is made with chest CT dated 4/14/2019.    PROCEDURE:   CT of the Abdomen and Pelvis was performed without intravenous contrast.   Intravenous contrast: None.  Oral contrast: positive contrast was administered.  Sagittal and coronal reformats were performed.    Evaluation of the solid visceral organs is limited without intravenous   contrast.    FINDINGS:    LOWER CHEST: Small bilateral pleural effusions, with associated bibasilar   atelectasis. Partially imaged right lower lobe nodule, better evaluated   on chest CT dated 4/14/2019.Calcification of the mitral annulus and   coronary arterial calcification. Small pericardial effusion. Partially   imaged peripherally calcified lesion in the right breast. A 0.9 cm nodule   is also noted in the right breast. Partially imaged soft tissue density   deep in the lateral aspect of the left breast as noted on prior CT.   Correlation with prior surgical site is suggested.    LIVER: Within normal limits.  BILE DUCTS: Normal caliber.  GALLBLADDER: Within normal limits.  SPLEEN: Within normal limits.  PANCREAS: Diffusely atrophic. No  ADRENALS: Within normal limits.  KIDNEYS/URETERS: No hydronephrosis. Bilateral renal cysts. Subcentimeter   hypo and hyperdense foci in the kidneys, too small to characterize.    BLADDER: Within normal limits.  REPRODUCTIVE ORGANS: Status post hysterectomy.    BOWEL: No bowel obstruction. Appendix is within normal limits. Small   hiatal hernia. Colonic diverticulosis.    PERITONEUM: No ascites.  VESSELS:  Atherosclerotic calcification.  RETROPERITONEUM: No lymphadenopathy.    ABDOMINAL WALL: Small fat-containing umbilical hernia.  BONES: Scoliosis and severe degenerative changes in the spine, with loss   of height of multiple vertebral bodies.. Partially imaged orthopedic   hardware in the right humerus. Partially imaged old left rib fractures.    IMPRESSION: Limited evaluation of the solid visceral organs without   intravenous contrast.    Small bilateral pleural effusions with bibasilar atelectasis. Small   pericardial effusion.    Colonic diverticulosis.              < end of copied text >
[facility] Cardiology Progress Note  _______________________________    Pt. seen and examined. No new cardiac-related complaints.    Telemetry -sinus 70-80s    T(C): 36.4 (04-23-19 @ 08:26), Max: 36.8 (04-22-19 @ 19:55)  HR: 81 (04-23-19 @ 08:26) (67 - 81)  BP: 146/77 (04-23-19 @ 08:26) (123/70 - 146/77)  RR: 18 (04-23-19 @ 08:26) (18 - 18)  SpO2: 93% (04-23-19 @ 08:26) (92% - 96%)  I&O's Summary    22 Apr 2019 07:01  -  23 Apr 2019 07:00  --------------------------------------------------------  IN: 620 mL / OUT: 800 mL / NET: -180 mL    23 Apr 2019 07:01  -  23 Apr 2019 09:59  --------------------------------------------------------  IN: 240 mL / OUT: 0 mL / NET: 240 mL        PHYSICAL EXAM:  GENERAL: Alert, NAD.  NECK: Supple  CHEST/LUNG: Clear to auscultation bilaterally; No wheezes, rales, or rhonchi.  HEART: S1 S2 normal, RRR; No murmurs, rubs, or gallops  ABDOMEN: Soft, Nondistended  EXTREMITIES:  No LE edema.      LABS:                        8.2    25.2  )-----------( 304      ( 23 Apr 2019 06:12 )             26.7     04-23    139  |  104  |  102<H>  ----------------------------<  247<H>  5.0   |  19<L>  |  4.28<H>    Ca    9.4      23 Apr 2019 05:48  Mg     2.3     04-22      PT/INR - ( 22 Apr 2019 02:22 )   PT: 21.5 sec;   INR: 1.85 ratio         PTT - ( 23 Apr 2019 09:03 )  PTT:28.8 sec              MEDICATIONS  (STANDING):  ALBUTerol/ipratropium for Nebulization 3 milliLiter(s) Nebulizer every 4 hours  amLODIPine   Tablet 10 milliGRAM(s) Oral daily  buDESOnide   0.5 milliGRAM(s) Respule 0.5 milliGRAM(s) Inhalation two times a day  calcium carbonate 1250 mG  + Vitamin D (OsCal 500 + D) 1 Tablet(s) Oral daily  docusate sodium 100 milliGRAM(s) Oral three times a day  epoetin zaina Injectable 50362 Unit(s) SubCutaneous <User Schedule>  ergocalciferol 77589 Unit(s) Oral every week  hydroxychloroquine 200 milliGRAM(s) Oral daily  levothyroxine 50 MICROGram(s) Oral daily  metoprolol succinate  milliGRAM(s) Oral daily  pantoprazole    Tablet 40 milliGRAM(s) Oral before breakfast  predniSONE   Tablet 30 milliGRAM(s) Oral daily  predniSONE   Tablet   Oral   senna 2 Tablet(s) Oral at bedtime    MEDICATIONS  (PRN):  acetaminophen   Tablet .. 650 milliGRAM(s) Oral every 6 hours PRN Mild Pain (1 - 3)  guaiFENesin   Syrup  (Sugar-Free) 100 milliGRAM(s) Oral every 6 hours PRN Cough  melatonin 3 milliGRAM(s) Oral at bedtime PRN Insomnia  oxyCODONE    IR 2.5 milliGRAM(s) Oral four times a day PRN Severe Pain (7 - 10)  polyethylene glycol 3350 17 Gram(s) Oral daily PRN Constipation        RADIOLOGY & ADDITIONAL TESTS:
[facility], Division of Infectious Diseases  BETHEL Vega A. Lee  [phone]    Name: JACKI SEWELL  Age: 84y  Gender: Female  MRN: [id number]    Interval History--  Notes reviewed. Seems lucid presently. No complaints except "...every time I try to go to sleep someone wakes me up" (I just did). Denies any  SOB, cough or CP.    Past Medical History--  CKD (chronic kidney disease)  Prediabetes  Breast cancer  Hypothyroid  Anarthritic Rheumatoid Disease  Mee HTN  GERD (Gastroesophageal Reflux  S/P shoulder surgery  Embolus  H/O: Hysterectomy      For details regarding the patient's social history, family history, and other miscellaneous elements, please refer the initial infectious diseases consultation and/or the admitting history and physical examination for this admission.    Allergies    Allegra (Unknown)  amoxicillin (Hives)  Cipro (Hives)  shellfish (Rash)    Intolerances        Medications--  Antibiotics:  hydroxychloroquine 200 milliGRAM(s) Oral daily    Immunologic:  epoetin zaina Injectable 18384 Unit(s) SubCutaneous <User Schedule>    Other:  acetaminophen   Tablet .. PRN  ALBUTerol/ipratropium for Nebulization  amLODIPine   Tablet  buDESOnide   0.5 milliGRAM(s) Respule  calcium carbonate 1250 mG  + Vitamin D (OsCal 500 + D)  docusate sodium  ergocalciferol  guaiFENesin   Syrup  (Sugar-Free) PRN  levothyroxine  melatonin PRN  metoprolol succinate ER  oxyCODONE    IR PRN  pantoprazole    Tablet  polyethylene glycol 3350 PRN  predniSONE   Tablet  senna      Review of Systems--  A 10-point review of systems was obtained.   Review of systems otherwise negative except as previously noted.    Physical Examination--  Vital Signs: T(F): 97.5 (04-23-19 @ 08:26), Max: 98.2 (04-22-19 @ 19:55)  HR: 81 (04-23-19 @ 08:26)  BP: 146/77 (04-23-19 @ 08:26)  RR: 18 (04-23-19 @ 08:26)  SpO2: 93% (04-23-19 @ 08:26)  Wt(kg): --  General: Nontoxic-appearing Female in no acute distress.  HEENT: AT/NC. Anicteric. Conjunctiva pale and moist. Oropharynx clear.   Neck: Not rigid. No sense of mass.  Nodes: None palpable.  Lungs: Diminished breath sounds bilaterally, poor effort.   Heart: Regular rate and rhythm. No Murmur. No rub. No gallop. No palpable thrill.  Abdomen: Bowel sounds present and normoactive. Soft. Nondistended. Nontender. No sense of mass. No organomegaly.  Extremities: No cyanosis or clubbing. No edema.   Skin: Warm. Dry. Good turgor. No rash. No vasculitic stigmata.  Psychiatric: Appropriate affect and mood for situation.         Laboratory Studies--  CBC                        8.2    25.2  )-----------( 304      ( 23 Apr 2019 06:12 )             26.7       Chemistries  04-23    139  |  104  |  102<H>  ----------------------------<  247<H>  5.0   |  19<L>  |  4.28<H>    Ca    9.4      23 Apr 2019 05:48  Mg     2.3     04-22        Culture Data  No new data
[facility], Division of Infectious Diseases  BETHEL Vega A. Lee  [phone]    Name: JACKI SEWELL  Age: 84y  Gender: Female  MRN: [id number]    Interval History--  Notes reviewed. Much more alert. Seems lucid. Speech fluent.       Past Medical History--  CKD (chronic kidney disease)  Prediabetes  Breast cancer  Hypothyroid  Anarthritic Rheumatoid Disease  Mee HTN  GERD (Gastroesophageal Reflux  S/P shoulder surgery  Embolus  H/O: Hysterectomy      For details regarding the patient's social history, family history, and other miscellaneous elements, please refer the initial infectious diseases consultation and/or the admitting history and physical examination for this admission.    Allergies    Allegra (Unknown)  amoxicillin (Hives)  Cipro (Hives)  shellfish (Rash)    Intolerances      Medications--  Antibiotics:  ertapenem  IVPB 500 milliGRAM(s) IV Intermittent every 24 hours  hydroxychloroquine 200 milliGRAM(s) Oral daily    Immunologic:    Other:  ALBUTerol/ipratropium for Nebulization  amLODIPine   Tablet  buDESOnide   0.5 milliGRAM(s) Respule  calcium carbonate 1250 mG  + Vitamin D (OsCal 500 + D)  docusate sodium  ergocalciferol  guaiFENesin   Syrup  (Sugar-Free) PRN  heparin  Infusion.  heparin  Injectable PRN  heparin  Injectable PRN  levothyroxine  melatonin PRN  metoprolol succinate ER  oxyCODONE    IR PRN  pantoprazole    Tablet  polyethylene glycol 3350 PRN  predniSONE   Tablet  senna      Review of Systems--  A 10-point review of systems was obtained.   Review of systems otherwise negative except as previously noted.    Physical Examination--  Vital Signs: T(F): 99.3 (04-17-19 @ 11:30), Max: 99.3 (04-17-19 @ 11:30)  HR: 102 (04-17-19 @ 11:30)  BP: 161/75 (04-17-19 @ 11:30)  RR: 18 (04-17-19 @ 11:30)  SpO2: 93% (04-17-19 @ 11:30)  Wt(kg): --  General: Nontoxic-appearing Female in no acute distress.  HEENT: AT/NC. Anicteric. Conjunctiva pale and moist. Oropharynx clear.   Neck: Not rigid. No sense of mass.  Nodes: None palpable.  Lungs: Diminihshed breath sounds bilaterally, poor effort.   Heart: Regular rate and rhythm. No Murmur. No rub. No gallop. No palpable thrill.  Abdomen: Bowel sounds present and normoactive. Soft. Nondistended. Nontender. No sense of mass. No organomegaly.  Extremities: No cyanosis or clubbing. No edema.   Skin: Warm. Dry. Good turgor. No rash. No vasculitic stigmata.  Psychiatric: Appropriate affect and mood for situation.       Laboratory Studies--  CBC                        8.2    13.08 )-----------( 340      ( 17 Apr 2019 09:34 )             27.3       Chemistries  04-17    141  |  107  |  50<H>  ----------------------------<  170<H>  4.2   |  21<L>  |  3.75<H>    Ca    10.1      17 Apr 2019 06:47        Culture Data  Culture - Urine (collected 16 Apr 2019 06:07)  Source: .Urine  Final Report (17 Apr 2019 09:45):    <10,000 CFU/mL Normal Urogenital Carmita    Culture - Blood (collected 15 Apr 2019 09:56)  Source: .Blood  Preliminary Report (16 Apr 2019 10:01):    No growth to date.    Culture - Blood (collected 14 Apr 2019 18:25)  Source: .Blood  Preliminary Report (15 Apr 2019 19:01):    No growth to date.
[facility] Cardiology Progress Note  _______________________________    Pt. seen and examined. No new cardiac-related complaints.    Telemetry -sinus 70-90s    T(C): 36.7 (04-19-19 @ 07:53), Max: 36.9 (04-18-19 @ 11:30)  HR: 85 (04-19-19 @ 07:53) (82 - 86)  BP: 158/68 (04-19-19 @ 07:53) (149/73 - 158/73)  RR: 18 (04-19-19 @ 07:53) (18 - 18)  SpO2: 93% (04-19-19 @ 07:53) (93% - 96%)  I&O's Summary    18 Apr 2019 07:01  -  19 Apr 2019 07:00  --------------------------------------------------------  IN: 620 mL / OUT: 2150 mL / NET: -1530 mL    19 Apr 2019 07:01  -  19 Apr 2019 09:20  --------------------------------------------------------  IN: 0 mL / OUT: 0 mL / NET: 0 mL        PHYSICAL EXAM:  GENERAL: Alert, NAD.  NECK: Supple  CHEST/LUNG: Clear to auscultation bilaterally; No wheezes, rales, or rhonchi.  HEART: S1 S2 normal, RRR; No murmurs, rubs, or gallops  ABDOMEN: Soft, Nondistended  EXTREMITIES:  No LE edema.      LABS:                        8.0    17.13 )-----------( 365      ( 18 Apr 2019 07:42 )             27.1     04-19    138  |  103  |  61<H>  ----------------------------<  228<H>  5.1   |  21<L>  |  3.91<H>    Ca    10.3      19 Apr 2019 05:53    TPro  6.3  /  Alb  3.5  /  TBili  0.1<L>  /  DBili  x   /  AST  9<L>  /  ALT  9<L>  /  AlkPhos  75  04-18    PTT - ( 18 Apr 2019 19:33 )  PTT:79.0 sec          Urinalysis Basic - ( 17 Apr 2019 22:19 )    Color: x / Appearance: x / SG: x / pH: x  Gluc: x / Ketone: x  / Bili: x / Urobili: x   Blood: x / Protein: x / Nitrite: x   Leuk Esterase: x / RBC: 7 /hpf / WBC 3 /HPF   Sq Epi: x / Non Sq Epi: 2 /hpf / Bacteria: Negative        MEDICATIONS  (STANDING):  ALBUTerol/ipratropium for Nebulization 3 milliLiter(s) Nebulizer every 4 hours  amLODIPine   Tablet 10 milliGRAM(s) Oral daily  buDESOnide   0.5 milliGRAM(s) Respule 0.5 milliGRAM(s) Inhalation two times a day  calcium carbonate 1250 mG  + Vitamin D (OsCal 500 + D) 1 Tablet(s) Oral daily  docusate sodium 100 milliGRAM(s) Oral three times a day  epoetin zaina Injectable 50722 Unit(s) SubCutaneous <User Schedule>  ergocalciferol 11902 Unit(s) Oral every week  ertapenem  IVPB 500 milliGRAM(s) IV Intermittent every 24 hours  furosemide   Injectable 40 milliGRAM(s) IV Push daily  heparin  Infusion.  Unit(s)/Hr (17 mL/Hr) IV Continuous <Continuous>  hydroxychloroquine 200 milliGRAM(s) Oral daily  iron sucrose IVPB 100 milliGRAM(s) IV Intermittent every 24 hours  levothyroxine 50 MICROGram(s) Oral daily  metoprolol succinate ER 50 milliGRAM(s) Oral daily  pantoprazole    Tablet 40 milliGRAM(s) Oral before breakfast  predniSONE   Tablet 20 milliGRAM(s) Oral two times a day  predniSONE   Tablet   Oral   senna 2 Tablet(s) Oral at bedtime    MEDICATIONS  (PRN):  guaiFENesin   Syrup  (Sugar-Free) 100 milliGRAM(s) Oral every 6 hours PRN Cough  heparin  Injectable 7500 Unit(s) IV Push every 6 hours PRN For aPTT less than 40  heparin  Injectable 3500 Unit(s) IV Push every 6 hours PRN For aPTT between 40 - 57  melatonin 3 milliGRAM(s) Oral at bedtime PRN Insomnia  oxyCODONE    IR 2.5 milliGRAM(s) Oral four times a day PRN Severe Pain (7 - 10)  polyethylene glycol 3350 17 Gram(s) Oral daily PRN Constipation        RADIOLOGY & ADDITIONAL TESTS:
[facility]   [phone]  RENAL FOLLOW UP NOTE  --------------------------------------------------------------------------------  Chief Complaint:    24 hour events/subjective:    seen earlier. states wheezing has decreased     PAST HISTORY  --------------------------------------------------------------------------------  No significant changes to PMH, PSH, FHx, SHx, unless otherwise noted    ALLERGIES & MEDICATIONS  --------------------------------------------------------------------------------  Allergies    Allegra (Unknown)  amoxicillin (Hives)  Cipro (Hives)  shellfish (Rash)    Intolerances      Standing Inpatient Medications  ALBUTerol/ipratropium for Nebulization 3 milliLiter(s) Nebulizer every 4 hours  amLODIPine   Tablet 10 milliGRAM(s) Oral daily  buDESOnide   0.5 milliGRAM(s) Respule 0.5 milliGRAM(s) Inhalation two times a day  calcium carbonate 1250 mG  + Vitamin D (OsCal 500 + D) 1 Tablet(s) Oral daily  docusate sodium 100 milliGRAM(s) Oral three times a day  ergocalciferol 98067 Unit(s) Oral every week  ertapenem  IVPB 500 milliGRAM(s) IV Intermittent every 24 hours  heparin  Infusion.  Unit(s)/Hr IV Continuous <Continuous>  hydroxychloroquine 200 milliGRAM(s) Oral daily  levothyroxine 50 MICROGram(s) Oral daily  metoprolol succinate ER 50 milliGRAM(s) Oral daily  pantoprazole    Tablet 40 milliGRAM(s) Oral before breakfast  predniSONE   Tablet 30 milliGRAM(s) Oral two times a day  senna 2 Tablet(s) Oral at bedtime    PRN Inpatient Medications  guaiFENesin   Syrup  (Sugar-Free) 100 milliGRAM(s) Oral every 6 hours PRN  heparin  Injectable 7500 Unit(s) IV Push every 6 hours PRN  heparin  Injectable 3500 Unit(s) IV Push every 6 hours PRN  melatonin 3 milliGRAM(s) Oral at bedtime PRN  oxyCODONE    IR 2.5 milliGRAM(s) Oral four times a day PRN  polyethylene glycol 3350 17 Gram(s) Oral daily PRN      REVIEW OF SYSTEMS  --------------------------------------------------------------------------------    Gen: denies  fevers/chills,  CVS: denies chest pain/palpitations  Resp: denies SOB/Cough  GI: Denies N/V/Abd pain  : Denies dysuria/oliguria/hematuria    All other systems were reviewed and are negative, except as noted.    VITALS/PHYSICAL EXAM  --------------------------------------------------------------------------------  T(C): 36.7 (04-17-19 @ 20:17), Max: 37.4 (04-17-19 @ 11:30)  HR: 91 (04-17-19 @ 20:17) (86 - 103)  BP: 140/75 (04-17-19 @ 20:17) (133/61 - 180/82)  RR: 18 (04-17-19 @ 20:17) (18 - 22)  SpO2: 93% (04-17-19 @ 20:17) (93% - 95%)  Wt(kg): --        04-16-19 @ 07:01  -  04-17-19 @ 07:00  --------------------------------------------------------  IN: 1371 mL / OUT: 800 mL / NET: 571 mL    04-17-19 @ 07:01  -  04-17-19 @ 20:37  --------------------------------------------------------  IN: 555 mL / OUT: 300 mL / NET: 255 mL      Physical Exam:  	  Gen: Non toxic comfortable appearing   	no jvd   	Pulm: decrease bs  + wheezing 	  CV: RRR, S1S2; no rub  Abd: +BS, soft, nontender/nondistended  	: No suprapubic tenderness  	UE: Warm, no cyanosis  no clubbing,  no edema  	LE: Warm, no cyanosis  no clubbing, no edema  	Neuro: alert and oriented. speech coherent   	Skin: Warm, no decrease skin turgor         LABS/STUDIES  --------------------------------------------------------------------------------              8.2    13.08 >-----------<  340      [04-17-19 @ 09:34]              27.3     141  |  107  |  50  ----------------------------<  170      [04-17-19 @ 06:47]  4.2   |  21  |  3.75        Ca     10.1     [04-17-19 @ 06:47]      PT/INR: PT 16.7 , INR 1.44       [04-17-19 @ 07:54]  PTT: 134.4      [04-17-19 @ 07:54]      Creatinine Trend:  SCr 3.75 [04-17 @ 06:47]  SCr 3.67 [04-16 @ 06:21]  SCr 3.70 [04-15 @ 06:40]  SCr 3.89 [04-14 @ 05:58]  SCr 3.55 [04-13 @ 19:38]              Urinalysis - [04-15-19 @ 16:48]      Color Light Yellow / Appearance Clear / SG 1.008 / pH 6.0      Gluc Negative / Ketone Negative  / Bili Negative / Urobili <2 mg/dL       Blood Trace / Protein 100 mg/dL / Leuk Est Negative / Nitrite Negative      RBC 1 / WBC 1 / Hyaline 0 / Gran  / Sq Epi  / Non Sq Epi 1 / Bacteria TNTC    Urine Creatinine 27      [04-14-19 @ 05:59]  Urine Protein 163      [04-14-19 @ 05:59]    Iron 14, TIBC 289, %sat 5      [04-14-19 @ 09:47]  Ferritin 34      [04-14-19 @ 10:14]  PTH -- (Ca 9.3)      [04-14-19 @ 10:14]   226  Vitamin D (25OH) 37.6      [04-14-19 @ 09:47]  TSH 0.68      [04-13-19 @ 22:30]  Lipid: chol 157, TG 66, HDL 55, LDL 89      [04-14-19 @ 09:47]
[facility] Cardiology Progress Note  _______________________________    Pt. seen and examined. No new cardiac-related complaints.    Telemetry -sinus     T(C): 36.6 (19 @ 04:23), Max: 36.9 (04-15-19 @ 22:32)  HR: 100 (19 @ 06:45) (89 - 100)  BP: 151/80 (19 @ 06:45) (150/60 - 168/73)  RR: 18 (19 @ 04:23) (18 - 18)  SpO2: 94% (19 @ 04:23) (93% - 96%)  I&O's Summary    15 Apr 2019 07:01  -  2019 07:00  --------------------------------------------------------  IN: 1416 mL / OUT: 1400 mL / NET: 16 mL        PHYSICAL EXAM:  GENERAL: Alert, NAD.  NECK: Supple  CHEST/LUNG: Clear to auscultation bilaterally; No wheezes, rales, or rhonchi.  HEART: S1 S2 normal, RRR; No murmurs, rubs, or gallops  ABDOMEN: Soft, Nondistended  EXTREMITIES:  No LE edema.      LABS:                        8.4    16.49 )-----------( 320      ( 2019 09:00 )             29.1     -16    144  |  107  |  47<H>  ----------------------------<  154<H>  4.0   |  21<L>  |  3.67<H>    Ca    9.7      2019 06:21      PT/INR - ( 2019 08:42 )   PT: 16.4 sec;   INR: 1.44 ratio         PTT - ( 2019 08:42 )  PTT:75.2 sec          Urinalysis Basic - ( 15 Apr 2019 16:48 )    Color: Light Yellow / Appearance: Clear / S.008 / pH: x  Gluc: x / Ketone: Negative  / Bili: Negative / Urobili: <2 mg/dL   Blood: x / Protein: 100 mg/dL / Nitrite: Negative   Leuk Esterase: Negative / RBC: 1 /HPF / WBC 1 /HPF   Sq Epi: x / Non Sq Epi: 1 /HPF / Bacteria: TNTC        MEDICATIONS  (STANDING):  ALBUTerol/ipratropium for Nebulization 3 milliLiter(s) Nebulizer every 4 hours  amLODIPine   Tablet 5 milliGRAM(s) Oral daily  buDESOnide   0.5 milliGRAM(s) Respule 0.5 milliGRAM(s) Inhalation two times a day  calcium carbonate 1250 mG  + Vitamin D (OsCal 500 + D) 1 Tablet(s) Oral daily  ergocalciferol 71641 Unit(s) Oral every week  ertapenem  IVPB 500 milliGRAM(s) IV Intermittent every 24 hours  heparin  Infusion.  Unit(s)/Hr (17 mL/Hr) IV Continuous <Continuous>  hydroxychloroquine 200 milliGRAM(s) Oral daily  levothyroxine 50 MICROGram(s) Oral daily  pantoprazole    Tablet 40 milliGRAM(s) Oral before breakfast  predniSONE   Tablet 1 milliGRAM(s) Oral two times a day    MEDICATIONS  (PRN):  guaiFENesin   Syrup  (Sugar-Free) 100 milliGRAM(s) Oral every 6 hours PRN Cough  heparin  Injectable 7500 Unit(s) IV Push every 6 hours PRN For aPTT less than 40  heparin  Injectable 3500 Unit(s) IV Push every 6 hours PRN For aPTT between 40 - 57  oxyCODONE    IR 2.5 milliGRAM(s) Oral four times a day PRN Severe Pain (7 - 10)  zolpidem 5 milliGRAM(s) Oral at bedtime PRN Insomnia        RADIOLOGY & ADDITIONAL TESTS:
Follow-up Pulm Progress Note  Lai Payton MD  [phone]    Afebrile day #6 ertapenum  MBS: positive silent aspiration/dypshagia  Breathing comfortably supine - wheezing resolved    Vital Signs Last 24 Hrs  T(C): 36.8 (20 Apr 2019 11:57), Max: 37.1 (19 Apr 2019 20:14)  T(F): 98.2 (20 Apr 2019 11:57), Max: 98.7 (19 Apr 2019 20:14)  HR: 71 (20 Apr 2019 11:57) (71 - 87)  BP: 145/70 (20 Apr 2019 11:57) (145/70 - 167/72)  BP(mean): --  RR: 18 (20 Apr 2019 11:57) (18 - 18)  SpO2: 93% (20 Apr 2019 11:57) (93% - 96%)                         8.3    22.15 )-----------( 379      ( 20 Apr 2019 10:30 )            28.6     04-20    139  |  105  |  75<H>  ----------------------------<  321<H>  5.3   |  20<L>  |  3.87<H>    Ca    9.8      20 Apr 2019 04:50      Serum Pro-Brain Natriuretic Peptide: 7846 pg/mL (04-13-19 @ 19:38)      CULTURES:  Culture Results:   No growth to date. (04-15 @ 09:56)  Culture Results:   No growth to date. (04-14 @ 18:25)    Most recent blood culture -- 04-15 @ 09:56   -- -- .Blood 04-15 @ 09:56  Most recent blood culture -- 04-14 @ 18:25   -- -- .Blood 04-14 @ 18:25      Physical Examination:  PULM: no sign wheeze or rhonchi  CVS: Regular rate and rhythm, no murmurs, rubs, or gallops  ABD: Soft, non-tender  EXT:  No clubbing, cyanosis, or edema    RADIOLOGY REVIEWED  CXR:    CT chest:    TTE:
[facility], Division of Infectious Diseases  BETHEL Vega A. Lee  [phone]    Name: JACKI SEWELL  Age: 84y  Gender: Female  MRN: [id number]    Interval History--  Notes reviewed. Only complaint at present is that she does not like being on a low-salt diet.   No other issues  S/LP eval appreciated, +aspiration risk.    Past Medical History--  CKD (chronic kidney disease)  Prediabetes  Breast cancer  Hypothyroid  Anarthritic Rheumatoid Disease  Mee HTN  GERD (Gastroesophageal Reflux  S/P shoulder surgery  Embolus  H/O: Hysterectomy      For details regarding the patient's social history, family history, and other miscellaneous elements, please refer the initial infectious diseases consultation and/or the admitting history and physical examination for this admission.    Allergies    Allegra (Unknown)  amoxicillin (Hives)  Cipro (Hives)  shellfish (Rash)    Intolerances        Medications--  Antibiotics:  ertapenem  IVPB 500 milliGRAM(s) IV Intermittent every 24 hours  hydroxychloroquine 200 milliGRAM(s) Oral daily    Immunologic:  epoetin zaina Injectable 59803 Unit(s) SubCutaneous <User Schedule>    Other:  ALBUTerol/ipratropium for Nebulization  amLODIPine   Tablet  buDESOnide   0.5 milliGRAM(s) Respule  calcium carbonate 1250 mG  + Vitamin D (OsCal 500 + D)  docusate sodium  ergocalciferol  guaiFENesin   Syrup  (Sugar-Free) PRN  heparin  Infusion.  heparin  Injectable PRN  heparin  Injectable PRN  iron sucrose IVPB  levothyroxine  melatonin PRN  metoprolol succinate ER  oxyCODONE    IR PRN  pantoprazole    Tablet  polyethylene glycol 3350 PRN  predniSONE   Tablet  predniSONE   Tablet  senna      Review of Systems--  A 10-point review of systems was obtained.   Review of systems otherwise negative except as previously noted.    Physical Examination--  Vital Signs: T(F): 97.9 (04-19-19 @ 11:56), Max: 98.1 (04-19-19 @ 07:53)  HR: 78 (04-19-19 @ 11:56)  BP: 159/73 (04-19-19 @ 11:56)  RR: 18 (04-19-19 @ 11:56)  SpO2: 95% (04-19-19 @ 11:56)  Wt(kg): --  General: Nontoxic-appearing Female in no acute distress.  HEENT: AT/NC. Anicteric. Conjunctiva pale and moist. Oropharynx clear.   Neck: Not rigid. No sense of mass.  Nodes: None palpable.  Lungs: Diminihshed breath sounds bilaterally, poor effort.   Heart: Regular rate and rhythm. No Murmur. No rub. No gallop. No palpable thrill.  Abdomen: Bowel sounds present and normoactive. Soft. Nondistended. Nontender. No sense of mass. No organomegaly.  Extremities: No cyanosis or clubbing. No edema.   Skin: Warm. Dry. Good turgor. No rash. No vasculitic stigmata.  Psychiatric: Appropriate affect and mood for situation.       Laboratory Studies--  CBC                        8.2    19.09 )-----------( 370      ( 19 Apr 2019 09:39 )             27.6       Chemistries  04-19    138  |  103  |  61<H>  ----------------------------<  228<H>  5.1   |  21<L>  |  3.91<H>    Ca    10.3      19 Apr 2019 05:53    TPro  6.3  /  Alb  3.5  /  TBili  0.1<L>  /  DBili  x   /  AST  9<L>  /  ALT  9<L>  /  AlkPhos  75  04-18      Culture Data    Culture - Urine (collected 16 Apr 2019 06:07)  Source: .Urine  Final Report (17 Apr 2019 09:45):    <10,000 CFU/mL Normal Urogenital Carmita    Culture - Blood (collected 15 Apr 2019 09:56)  Source: .Blood  Preliminary Report (16 Apr 2019 10:01):    No growth to date.    Culture - Blood (collected 14 Apr 2019 18:25)  Source: .Blood  Preliminary Report (15 Apr 2019 19:01):    No growth to date.
Patient is a 84y old  Female who presents with a chief complaint of shortness of breath (2019 11:51)      INTERVAL HPI/OVERNIGHT EVENTS: noted, appears sob at rest, wheezing, lethargic and somnolent this am      Vital Signs Last 24 Hrs  T(C): 36.6 (2019 04:23), Max: 36.9 (15 Apr 2019 22:32)  T(F): 97.9 (2019 04:23), Max: 98.4 (15 Apr 2019 22:32)  HR: 95 (2019 12:13) (90 - 100)  BP: 169/74 (2019 12:13) (150/60 - 169/74)  BP(mean): --  RR: 18 (2019 04:23) (18 - 18)  SpO2: 96% (2019 12:13) (93% - 96%)    ALBUTerol/ipratropium for Nebulization 3 milliLiter(s) Nebulizer every 4 hours  amLODIPine   Tablet 5 milliGRAM(s) Oral daily  buDESOnide   0.5 milliGRAM(s) Respule 0.5 milliGRAM(s) Inhalation two times a day  calcium carbonate 1250 mG  + Vitamin D (OsCal 500 + D) 1 Tablet(s) Oral daily  ergocalciferol 35123 Unit(s) Oral every week  ertapenem  IVPB 500 milliGRAM(s) IV Intermittent every 24 hours  guaiFENesin   Syrup  (Sugar-Free) 100 milliGRAM(s) Oral every 6 hours PRN  heparin  Infusion.  Unit(s)/Hr IV Continuous <Continuous>  heparin  Injectable 7500 Unit(s) IV Push every 6 hours PRN  heparin  Injectable 3500 Unit(s) IV Push every 6 hours PRN  hydroxychloroquine 200 milliGRAM(s) Oral daily  levothyroxine 50 MICROGram(s) Oral daily  oxyCODONE    IR 2.5 milliGRAM(s) Oral four times a day PRN  pantoprazole    Tablet 40 milliGRAM(s) Oral before breakfast  predniSONE   Tablet 30 milliGRAM(s) Oral two times a day  zolpidem 5 milliGRAM(s) Oral at bedtime PRN      PHYSICAL EXAM:  GENERAL: NAD,   EYES: conjunctiva and sclera clear  ENMT: Moist mucous membranes  NECK: Supple, No JVD, Normal thyroid  NERVOUS SYSTEM:  Alert & Oriented X2-3   CHEST/LUNG: Clear to auscultation bilaterally; No rales, rhonchi, wheezing, or rubs  HEART: Regular rate and rhythm; No murmurs, rubs, or gallops  ABDOMEN: Soft, Nontender, Nondistended; Bowel sounds present  EXTREMITIES:  b/ledema  LYMPH: No lymphadenopathy noted  SKIN: No rashes or lesions    Consultant(s) Notes Reviewed:  [x ] YES  [ ] NO  Care Discussed with Consultants/Other Providers [ x] YES  [ ] NO    LABS:                        8.4    16.49 )-----------( 320      ( 2019 09:00 )             29.1     04-16    144  |  107  |  47<H>  ----------------------------<  154<H>  4.0   |  21<L>  |  3.67<H>    Ca    9.7      2019 06:21      PT/INR - ( 2019 08:42 )   PT: 16.4 sec;   INR: 1.44 ratio         PTT - ( 2019 08:42 )  PTT:75.2 sec  Urinalysis Basic - ( 15 Apr 2019 16:48 )    Color: Light Yellow / Appearance: Clear / S.008 / pH: x  Gluc: x / Ketone: Negative  / Bili: Negative / Urobili: <2 mg/dL   Blood: x / Protein: 100 mg/dL / Nitrite: Negative   Leuk Esterase: Negative / RBC: 1 /HPF / WBC 1 /HPF   Sq Epi: x / Non Sq Epi: 1 /HPF / Bacteria: TNTC      CAPILLARY BLOOD GLUCOSE            Urinalysis Basic - ( 15 Apr 2019 16:48 )    Color: Light Yellow / Appearance: Clear / S.008 / pH: x  Gluc: x / Ketone: Negative  / Bili: Negative / Urobili: <2 mg/dL   Blood: x / Protein: 100 mg/dL / Nitrite: Negative   Leuk Esterase: Negative / RBC: 1 /HPF / WBC 1 /HPF   Sq Epi: x / Non Sq Epi: 1 /HPF / Bacteria: TNTC        RADIOLOGY & ADDITIONAL TESTS:    Imaging Personally Reviewed:  [x ] YES  [ ] NO
Patient is a 84y old  Female who presents with a chief complaint of shortness of breath (24 Apr 2019 13:48)  patient not known to me, assigned this AM to assume care  chart reviewed and events thus far noted      SUBJECTIVE / OVERNIGHT EVENTS: no overnight events    ROS:  Resp: No cough no sputum production  CVS: No chest pain no palpitations no orthopnea  GI: no N/V/D  : no dysuria, no hematuria  Neuro: no weakness no paresthesias  Heme: No petechiae no easy bruising  Msk: No joint pain no swelling  Skin: No rash no itching        MEDICATIONS  (STANDING):  ALBUTerol/ipratropium for Nebulization 3 milliLiter(s) Nebulizer every 4 hours  amLODIPine   Tablet 10 milliGRAM(s) Oral daily  buDESOnide   0.5 milliGRAM(s) Respule 0.5 milliGRAM(s) Inhalation two times a day  calcium carbonate 1250 mG  + Vitamin D (OsCal 500 + D) 1 Tablet(s) Oral daily  docusate sodium 100 milliGRAM(s) Oral three times a day  epoetin zaina Injectable 68504 Unit(s) SubCutaneous <User Schedule>  ergocalciferol 56436 Unit(s) Oral every week  hydroxychloroquine 200 milliGRAM(s) Oral daily  levothyroxine 50 MICROGram(s) Oral daily  metoprolol succinate  milliGRAM(s) Oral daily  pantoprazole    Tablet 40 milliGRAM(s) Oral before breakfast  predniSONE   Tablet 20 milliGRAM(s) Oral daily  predniSONE   Tablet   Oral   senna 2 Tablet(s) Oral at bedtime    MEDICATIONS  (PRN):  acetaminophen   Tablet .. 650 milliGRAM(s) Oral every 6 hours PRN Mild Pain (1 - 3)  guaiFENesin   Syrup  (Sugar-Free) 100 milliGRAM(s) Oral every 6 hours PRN Cough  melatonin 3 milliGRAM(s) Oral at bedtime PRN Insomnia  oxyCODONE    IR 2.5 milliGRAM(s) Oral four times a day PRN Severe Pain (7 - 10)  polyethylene glycol 3350 17 Gram(s) Oral daily PRN Constipation        CAPILLARY BLOOD GLUCOSE        I&O's Summary    23 Apr 2019 07:01  -  24 Apr 2019 07:00  --------------------------------------------------------  IN: 720 mL / OUT: 1750 mL / NET: -1030 mL    24 Apr 2019 07:01  -  24 Apr 2019 15:28  --------------------------------------------------------  IN: 360 mL / OUT: 0 mL / NET: 360 mL        Vital Signs Last 24 Hrs  T(C): 36.5 (24 Apr 2019 12:13), Max: 37 (24 Apr 2019 04:34)  T(F): 97.7 (24 Apr 2019 12:13), Max: 98.6 (24 Apr 2019 04:34)  HR: 69 (24 Apr 2019 12:13) (69 - 81)  BP: 138/72 (24 Apr 2019 12:13) (133/69 - 148/72)  BP(mean): --  RR: 18 (24 Apr 2019 12:13) (18 - 19)  SpO2: 95% (24 Apr 2019 12:13) (94% - 98%)    PHYSICAL EXAM:  GENERAL: NAD,   EYES: conjunctiva and sclera clear  ENMT: Moist mucous membranes  NECK: Supple, No JVD, Normal thyroid  NERVOUS SYSTEM:  Alert & Oriented X3,   CHEST/LUNG: Clear to auscultation bilaterally; No rales, rhonchi, wheezing, or rubs  HEART: Regular rate and rhythm; No murmurs, rubs, or gallops  ABDOMEN: Soft, , Nondistended; Bowel sounds present, rt flank and ant abd wall-hematoma, tender  EXTREMITIES:  2+ Peripheral Pulses, No clubbing, cyanosis, or edema  LYMPH: No lymphadenopathy noted  SKIN: No rashes or lesions    LABS:                        8.0    23.0  )-----------( 287      ( 24 Apr 2019 05:55 )             24.4     04-24    137  |  103  |  105<H>  ----------------------------<  263<H>  4.9   |  18<L>  |  4.12<H>    Ca    9.3      24 Apr 2019 05:55      PTT - ( 23 Apr 2019 09:03 )  PTT:28.8 sec            All consultant(s) notes reviewed and care discussed with other providers        Contact Number, Dr Vera [phone]
Patient is a 84y old  Female who presents with a chief complaint of shortness of breath (23 Apr 2019 12:20)      INTERVAL HPI/OVERNIGHT EVENTS: noted,    pain at hematoma site      Vital Signs Last 24 Hrs  T(C): 36.3 (23 Apr 2019 12:04), Max: 36.8 (22 Apr 2019 19:55)  T(F): 97.3 (23 Apr 2019 12:04), Max: 98.2 (22 Apr 2019 19:55)  HR: 76 (23 Apr 2019 12:04) (70 - 81)  BP: 130/64 (23 Apr 2019 12:04) (127/72 - 146/77)  BP(mean): --  RR: 18 (23 Apr 2019 12:04) (18 - 18)  SpO2: 93% (23 Apr 2019 12:04) (92% - 94%)    acetaminophen   Tablet .. 650 milliGRAM(s) Oral every 6 hours PRN  ALBUTerol/ipratropium for Nebulization 3 milliLiter(s) Nebulizer every 4 hours  amLODIPine   Tablet 10 milliGRAM(s) Oral daily  buDESOnide   0.5 milliGRAM(s) Respule 0.5 milliGRAM(s) Inhalation two times a day  calcium carbonate 1250 mG  + Vitamin D (OsCal 500 + D) 1 Tablet(s) Oral daily  docusate sodium 100 milliGRAM(s) Oral three times a day  epoetin zaina Injectable 56223 Unit(s) SubCutaneous <User Schedule>  ergocalciferol 52408 Unit(s) Oral every week  guaiFENesin   Syrup  (Sugar-Free) 100 milliGRAM(s) Oral every 6 hours PRN  hydroxychloroquine 200 milliGRAM(s) Oral daily  levothyroxine 50 MICROGram(s) Oral daily  melatonin 3 milliGRAM(s) Oral at bedtime PRN  metoprolol succinate  milliGRAM(s) Oral daily  oxyCODONE    IR 2.5 milliGRAM(s) Oral four times a day PRN  pantoprazole    Tablet 40 milliGRAM(s) Oral before breakfast  polyethylene glycol 3350 17 Gram(s) Oral daily PRN  predniSONE   Tablet   Oral   senna 2 Tablet(s) Oral at bedtime      PHYSICAL EXAM:  GENERAL: NAD,   EYES: conjunctiva and sclera clear  ENMT: Moist mucous membranes  NECK: Supple, No JVD, Normal thyroid  NERVOUS SYSTEM:  Alert & Oriented X3,   CHEST/LUNG: Clear to auscultation bilaterally; No rales, rhonchi, wheezing, or rubs  HEART: Regular rate and rhythm; No murmurs, rubs, or gallops  ABDOMEN: Soft, , Nondistended; Bowel sounds present, rt flank and ant abd wall-hematoma, tender  EXTREMITIES:  2+ Peripheral Pulses, No clubbing, cyanosis, or edema  LYMPH: No lymphadenopathy noted  SKIN: No rashes or lesions    Consultant(s) Notes Reviewed:  [x ] YES  [ ] NO  Care Discussed with Consultants/Other Providers [ x] YES  [ ] NO    LABS:                        8.3    25.0  )-----------( 276      ( 23 Apr 2019 12:58 )             25.6     04-23    139  |  104  |  102<H>  ----------------------------<  247<H>  5.0   |  19<L>  |  4.28<H>    Ca    9.4      23 Apr 2019 05:48  Mg     2.3     04-22      PT/INR - ( 22 Apr 2019 02:22 )   PT: 21.5 sec;   INR: 1.85 ratio         PTT - ( 23 Apr 2019 09:03 )  PTT:28.8 sec    CAPILLARY BLOOD GLUCOSE                RADIOLOGY & ADDITIONAL TESTS:    Imaging Personally Reviewed:  [x ] YES  [ ] NO
Patient is a 84y old  Female who presents with a chief complaint of shortness of breath (2019 17:43)      INTERVAL HPI/OVERNIGHT EVENTS: sob, cough and wheezing      Vital Signs Last 24 Hrs  T(C): 36.9 (2019 20:52), Max: 37 (2019 22:00)  T(F): 98.4 (2019 20:52), Max: 98.6 (2019 22:00)  HR: 90 (2019 20:52) (74 - 90)  BP: 160/74 (2019 20:52) (143/65 - 166/69)  BP(mean): --  RR: 18 (2019 20:52) (16 - 18)  SpO2: 94% (2019 20:52) (94% - 96%)    ALBUTerol/ipratropium for Nebulization 3 milliLiter(s) Nebulizer every 4 hours  amLODIPine   Tablet 5 milliGRAM(s) Oral daily  calcium carbonate 1250 mG  + Vitamin D (OsCal 500 + D) 1 Tablet(s) Oral daily  ergocalciferol 52552 Unit(s) Oral every week  hydroxychloroquine 200 milliGRAM(s) Oral daily  iron sucrose IVPB 100 milliGRAM(s) IV Intermittent once  levothyroxine 50 MICROGram(s) Oral daily  oxyCODONE    IR 2.5 milliGRAM(s) Oral four times a day PRN  pantoprazole    Tablet 40 milliGRAM(s) Oral before breakfast  zolpidem 5 milliGRAM(s) Oral at bedtime PRN      PHYSICAL EXAM:  GENERAL: NAD,   EYES: conjunctiva and sclera clear  ENMT: Moist mucous membranes  NECK: Supple, No JVD, Normal thyroid  NERVOUS SYSTEM:  Alert & Oriented X3,   CHEST/LUNG: diffuse rhonchi  HEART: Regular rate and rhythm; No murmurs, rubs, or gallops  ABDOMEN: Soft, Nontender, Nondistended; Bowel sounds present  EXTREMITIES: b/l trace pedal edema  LYMPH: No lymphadenopathy noted  SKIN: No rashes or lesions    Consultant(s) Notes Reviewed:  [x ] YES  [ ] NO  Care Discussed with Consultants/Other Providers [ x] YES  [ ] NO    LABS:                        8.6    14.00 )-----------( 338      ( 2019 10:23 )             28.0     04-14    143  |  109<H>  |  49<H>  ----------------------------<  170<H>  4.6   |  19<L>  |  3.89<H>    Ca    9.7      2019 05:58  Phos  4.1         TPro  7.3  /  Alb  3.7  /  TBili  0.2  /  DBili  x   /  AST  17  /  ALT  15  /  AlkPhos  90      PT/INR - ( 2019 19:38 )   PT: 20.6 sec;   INR: 1.78 ratio         PTT - ( 2019 16:13 )  PTT:30.8 sec  Urinalysis Basic - ( 2019 09:47 )    Color: Light Yellow / Appearance: Slightly Turbid / S.009 / pH: x  Gluc: x / Ketone: Negative  / Bili: Negative / Urobili: <2 mg/dL   Blood: x / Protein: 100 mg/dL / Nitrite: Negative   Leuk Esterase: Negative / RBC: 1 /HPF / WBC 1 /HPF   Sq Epi: x / Non Sq Epi: 1 /HPF / Bacteria: TNTC      CAPILLARY BLOOD GLUCOSE            Urinalysis Basic - ( 2019 09:47 )    Color: Light Yellow / Appearance: Slightly Turbid / S.009 / pH: x  Gluc: x / Ketone: Negative  / Bili: Negative / Urobili: <2 mg/dL   Blood: x / Protein: 100 mg/dL / Nitrite: Negative   Leuk Esterase: Negative / RBC: 1 /HPF / WBC 1 /HPF   Sq Epi: x / Non Sq Epi: 1 /HPF / Bacteria: TNTC        RADIOLOGY & ADDITIONAL TESTS:< from: CT Chest No Cont (19 @ 04:55) >  Foci of bronchiolar impaction within the basilar left lower lobe and   lingula. Small bilateral pleural effusions and subsegmental atelectasis   of basilar lower lobes. Partial atelectasis of the lingula. Underlying   infection cannot be ruled out.     2.  0.9 cm subpleural solid nodule within the basilar right lower lobe   (series 2 image 68). Recommend follow-up chest CT in 3 months to   determine the stability/resolution.     3.  2.4 x 2 cm spiculated nodule within the posterior depth of the left   breast (series 2 image 54). Recommend further evaluation with diagnostic   mammogram and breast ultrasound.    < end of copied text >      Imaging Personally Reviewed:  [ x] YES  [ ] NO

## 2019-04-25 NOTE — PROGRESS NOTE ADULT - PROBLEM SELECTOR PLAN 10
VTE ppx: hold ac d/t hematoma  venodynes for now
VTE ppx: hold ac d/t hematoma  venodynes for now
VTE ppx:   dose coumadin daily goal inr 2-3, pt subtherapeutic, can restart heparin
VTE ppx: hold ac d/t hematoma  venodynes for now

## 2019-04-25 NOTE — PROGRESS NOTE ADULT - PROVIDER SPECIALTY LIST ADULT
Cardiology
Heme/Onc
Heme/Onc
Infectious Disease
Internal Medicine
Nephrology
Pulmonology
Cardiology
Pulmonology
Nephrology
Cardiology
Infectious Disease
Internal Medicine

## 2019-04-25 NOTE — PROGRESS NOTE ADULT - PROBLEM SELECTOR PLAN 1
ct chest reviewed results as above, bilat small pl effusion, atelectasis, bronchiolar impaction LLL and lingula, 0.9 lung nodule, spiculated breast nodule-  Onc cs appreciated  Pulm consulted- will eval pt in am, likely r/o a viral infection   need for diagnostic and therapeutic thoracentesis    cont nebs atc,
?CHF  CT not overly supportive of malignancy or pneumonia  Day#6 total Rx.  Plan fo 7 days
-Heart failure   -On Lasix 40 mgpo daily. stable  -Monitor i/os  -daily weights  -TTE- shows preserved LVEF with severe ms.  -pulm recs appreciated.
-Heart failure   -On Lasix 40 mg iv daily. creatinine rising slightly, may need to decrease to 20 mg iv or switch to po 40 mg daily.   -Monitor i/os  -daily weights  -TTE- shows preserved LVEF with severe ms.  -pulchristophe wright appreciated.
-Heart failure   -On Lasix 40 mgpo daily. stable  -Monitor i/os  -daily weights  -TTE- shows preserved LVEF with severe ms.  -pulm recs appreciated.
-Heart failure   -On Lasix 40 mgpo daily. stable  bp's running high, will discuss with nephrology, on max dose amlodipine and would not further titrate toprol  -Monitor i/os  -daily weights  -TTE- shows preserved LVEF with severe ms.  -pulm recs appreciated.
-Heart failure vs infectious related vs ?progression of ckd  -elevated bnp  -effusions on ct chest  -Ok with holding lasix for now. if creatinine starts to improve may need to resume.   -Monitor i/os  -daily weights  -TTE- shows preserved LVEF with severe ms.  -pulchristophe hdzs appreciated.
-Heart failure vs infectious related vs ?progression of ckd  -elevated bnp  -effusions on ct chest  -Ok with holding lasix for now. if creatinine starts to improve may need to resume.   -Monitor i/os  -daily weights  -TTE- shows preserved LVEF with severe ms.  -pulchristophe hdzs appreciated.
-Heart failure with likely superimposed renal failure.  -Monitor i/os  -daily weights  -TTE- shows preserved LVEF with severe ms.  -pulm recs appreciated.  -Of lasix given worsening renal function.
?CHF  CT not overly supportive of malignancy or pneumoia  On Rx vs. pneumonia.  Day#4 total Rx.  Plan fo 7 days
?CHF  CT not overly supportive of malignancy or pneumoia  Role of Abx TBD
?CHF  CT not overly supportive of malignancy or pneumonia  Day#5 total Rx.  Plan fo 7 days
Off antibiotics, monitor status
Off antibiotics, monitor status
ct chest reviewed results as above, bilat small pl effusion, atelectasis, bronchiolar impaction LLL and lingula, 0.9 lung nodule, spiculated breast nodule-  Onc cs appreciated  d/w Pulm effusions are bilateral most likely d/t vol cardiaca nd renal causes- now that size is reduced-no indication for thoracentesis  will start ertepenem, steroids  cont nebs atc,  dc lasix
ct chest reviewed results as above, bilat small pl effusion, atelectasis, bronchiolar impaction LLL and lingula, 0.9 lung nodule, spiculated breast nodule-  Onc cs appreciated  dw pulm-findings likely pt aspiration pna cont abx,a sp precautions, swallow eval  d/w Pulm effusions are bilateral most likely d/t volume overload cardiac a nd renal causes- now that size is reduced-no indication for thoracentesis  will start ertepenem, steroids  cont nebs atc,  dc lasix
h/h slight drop this am, serial h/h-will transfuse if symptomatic or <7.5  monitor size of hematoma  hold heparin and coumadin for now  if severe bleeding will need to reverse INR  Of note LE doppler prelim c/w Lt LE DVT-to fu official  heme onc reconsulted re: need for ivc filter
h/h stable  monitor size of hematoma  hold heparin and coumadin for now
h/h stable for now, serial h/h-will transfuse if symptomatic or <7.5  Hb 8.1 today  monitor size of hematoma  US states stable in size
-Heart failure vs infectious related vs ?progression of ckd  -elevated bnp  -effusions on ct chest  -Ok with holding lasix for now. if creatinine starts to improve may need to resume.   -Monitor i/os  -daily weights  -TTE- shows preserved LVEF with severe ms.  -pulchristophe hdzs appreciated.
ct chest reviewed results as above, bilat small pl effusion, atelectasis, bronchiolar impaction LLL and lingula, 0.9 lung nodule, spiculated breast nodule-  Onc cs appreciated  dw pulm-findings likely pt aspiration pna cont abx,a sp precautions, swallow eval  d/w Pulm effusions are bilateral most likely d/t volume overload cardiac a nd renal causes- now that size is reduced-no indication for thoracentesis  will start ertepenem, steroids  cont nebs atc,  dc lasix
h/h stable for now, serial h/h-will transfuse if symptomatic or <7.5  Hb 8 today  monitor size of hematoma  US states stable in size  hold heparin and coumadin for now  if severe bleeding will need to reverse INR   LE doppler gastrocnemius vn thrombus-considered to be distal/below knee dvt  heme onc reconsulted -pt remote dvt hx, and continued to stay on coumadin d/t RLE stent   so coumadin can be held safely in the setting of hematoma  Distal DVT-no need for AC, will monitor w serial US to see for progression
h/h stable for now, serial h/h-will transfuse if symptomatic or <7.5  monitor size of hematoma  hold heparin and coumadin for now  if severe bleeding will need to reverse INR   LE doppler gastrocnemius vn thrombus-considered to be distal/below knee dvt  heme onc reconsulted -pt remote dvt hx, and continued to stay on coumadin d/t RLE stent   so coumadin can be held safely in the setting of hematoma  Distal DVT-no need for AC, will monitor w serial US to see for progression

## 2019-04-25 NOTE — PROGRESS NOTE ADULT - PROBLEM SELECTOR PROBLEM 6
R/O CKD (chronic kidney disease) stage 5, GFR less than 15 ml/min
R/O CKD (chronic kidney disease) stage 5, GFR less than 15 ml/min
Hypothyroidism
Microcytic anemia
R/O CKD (chronic kidney disease) stage 5, GFR less than 15 ml/min
Rheumatoid arthritis involving both hands, unspecified rheumatoid factor presence

## 2019-04-25 NOTE — DISCHARGE NOTE NURSING/CASE MANAGEMENT/SOCIAL WORK - NSDCPEPT PROEDHF_GEN_ALL_CORE
Report signs and symptoms to primary care provider/Activities as tolerated/Monitor weight daily/Call primary care provider for follow up after discharge/Low salt diet

## 2019-04-25 NOTE — PROGRESS NOTE ADULT - PROBLEM SELECTOR PLAN 4
ct chest reviewed results as above, bilat small pl effusion, atelectasis, bronchiolar impaction LLL and lingula, 0.9 lung nodule, spiculated breast nodule-  Onc cs appreciated  dw pulm-findings likely pt aspiration pna cont abx,a sp precautions,   d/w Pulm -effusions are bilateral most likely d/t volume overload cardiac a nd renal causes- now that size is reduced-no indication for thoracentesis  cont nebs atc,  dc lasix
home dose of plaquenil.  morphine 5mg 4x day PRN for severe pain.
Start home dose of plaquenil.  morphine 5mg 4x day PRN for severe pain.
As per oncology note, patient would not want further Rx
Breast nodule on CT scan  onc fu noted  outpt comprehensive breast imaging  cThead and cta/p reviewed no acute findings, no metastatic dz
Pt found to have eGFR of 11.   renal cs fu  Check U pro: creatinine ratio, UA, strict I&Os, bladder and renal ultrasound   Avoid nephrotoxic agents  Check PTH, vitamin D level  Check lipid panel, TSH
Start home dose of plaquenil.  morphine 5mg 4x day PRN for severe pain.
ct chest reviewed results as above, bilat small pl effusion, atelectasis, bronchiolar impaction LLL and lingula, 0.9 lung nodule, spiculated breast nodule-  Onc cs appreciated  dw pulm-findings likely pt aspiration pna cont abx,a sp precautions,   d/w Pulm -effusions are bilateral most likely d/t volume overload cardiac a nd renal causes- now that size is reduced-no indication for thoracentesis  cont nebs atc,  dc lasix
ct chest reviewed results as above, bilat small pl effusion, atelectasis, bronchiolar impaction LLL and lingula, 0.9 lung nodule, spiculated breast nodule-  Onc cs appreciated  dw pulm-findings likely pt aspiration pna cont abx,a sp precautions, swallow eval  d/w Pulm effusions are bilateral most likely d/t volume overload cardiac a nd renal causes- now that size is reduced-no indication for thoracentesis  will start ertepenem, steroids  cont nebs atc,  dc lasix
ct chest reviewed results as above, bilat small pl effusion, atelectasis, bronchiolar impaction LLL and lingula, 0.9 lung nodule, spiculated breast nodule-  Onc cs appreciated  dw pulm-findings likely pt aspiration pna cont abx,a sp precautions, swallow eval  d/w Pulm effusions are bilateral most likely d/t volume overload cardiac a nd renal causes- now that size is reduced-no indication for thoracentesis  will start ertepenem, steroids  cont nebs atc,  dc lasix
ct chest reviewed results as above, bilat small pl effusion, atelectasis, bronchiolar impaction LLL and lingula, 0.9 lung nodule, spiculated breast nodule-  Onc cs appreciated  dw pulm-findings likely pt aspiration pna cont abx,a sp precautions,   d/w Pulm -effusions are bilateral most likely d/t volume overload cardiac a nd renal causes- now that size is reduced-no indication for thoracentesis  cont nebs atc,  dc lasix

## 2019-04-25 NOTE — PROGRESS NOTE ADULT - PROBLEM SELECTOR PLAN 7
home dose of plaquenil.  morphine 5mg 4x day PRN for severe pain.
home dose of plaquenil.  morphine 5mg 4x day PRN for severe pain.
protonix for known hx of GERD.
protonix for known hx of GERD.
Pt found to have microcytic anemia. Anemia may in part be related to chronic kidney disease.  stable
Start home dose of synthroid. Check TSH
home dose of plaquenil.  morphine 5mg 4x day PRN for severe pain.
protonix for known hx of GERD.

## 2019-04-25 NOTE — PROGRESS NOTE ADULT - PROBLEM SELECTOR PROBLEM 4
Rheumatoid arthritis involving both hands, unspecified rheumatoid factor presence
R/O Malignant neoplasm of female breast, unspecified estrogen receptor status, unspecified laterality, unspecified site of breast
Pleural effusion, right
R/O CKD (chronic kidney disease) stage 5, GFR less than 15 ml/min
R/O Malignant neoplasm of female breast, unspecified estrogen receptor status, unspecified laterality, unspecified site of breast
Rheumatoid arthritis involving both hands, unspecified rheumatoid factor presence
Rheumatoid arthritis involving both hands, unspecified rheumatoid factor presence
Pleural effusion, right
Pleural effusion, right

## 2019-04-26 ENCOUNTER — INBOUND DOCUMENT (OUTPATIENT)
Age: 84
End: 2019-04-26

## 2020-03-07 NOTE — SWALLOW VFSS/MBS ASSESSMENT ADULT - SLP PERTINENT HISTORY OF CURRENT PROBLEM
84yoF with PMH of PVD s/p stent, breast cancer s/p lumpectomy and RTx, RA, CKD, PVD s/p RLE stent, DVT on coumadin, hypothyroidism who presents to the ED with complaint of shortness of breath. The patient states she was diagnosed with "water in the lungs" in October 2018, and had been monitored over time with no intervention. Over the last week, she developed worsening ORTIZ, cough productive of dark yellow sputum, and wheezing, prompting her to visit Dr. Reynoso. She was planned for thoracentesis for this week, and her home dose of coumadin was held. Because patient was no longer able to ambulate short distances from her kitchen to her bathroom due to dyspnea, patient was brought to ED by her family and friends. She denies any fever, chills, N/V, CP palpitations, orthopnea or PND. Dr. Reynoso had started her on lasix 40mg PO qd, with which she has had increased urine output. Pt has known CKD. She states her normal SCr is around 2, however on recent labs had increased to about 3.17.
2-3 days

## 2020-03-16 NOTE — PROVIDER CONTACT NOTE (OTHER) - RECOMMENDATIONS
Doing well  Flu shot today  fht's 140's  Dm screen rtc 4 wks  
? stop heparin gtt, imaging to f/u
PA was made aware. Will continue to monitor.
to assess the pt. Pt put on O2 @ 2lpm via n/c for comfort.

## 2020-05-14 NOTE — CONSULT NOTE ADULT - PROVIDER SPECIALTY LIST ADULT
LM for TM regarding potential exposure to co worker (Jelly Liang)     TM called back- no unprotected exposure   Pulmonology

## 2021-07-29 NOTE — SWALLOW BEDSIDE ASSESSMENT ADULT - ASR SWALLOW RECOMMEND DIAG
Given abnormal CT chest findings, report of pt coughing during PO intake, and team concern for aspiration, would recommend MBS to objectively r/o same. Complex Repair And Graft Additional Text (Will Appearing After The Standard Complex Repair Text): The complex repair was not sufficient to completely close the primary defect. The remaining additional defect was repaired with the graft mentioned below.